# Patient Record
Sex: FEMALE | Race: OTHER | HISPANIC OR LATINO | URBAN - METROPOLITAN AREA
[De-identification: names, ages, dates, MRNs, and addresses within clinical notes are randomized per-mention and may not be internally consistent; named-entity substitution may affect disease eponyms.]

---

## 2018-04-18 ENCOUNTER — EMERGENCY (EMERGENCY)
Facility: HOSPITAL | Age: 73
LOS: 1 days | Discharge: LEFT BEFORE DISCHARGE | End: 2018-04-18
Admitting: EMERGENCY MEDICINE

## 2018-04-18 VITALS
DIASTOLIC BLOOD PRESSURE: 72 MMHG | RESPIRATION RATE: 18 BRPM | OXYGEN SATURATION: 96 % | TEMPERATURE: 98 F | SYSTOLIC BLOOD PRESSURE: 153 MMHG | HEART RATE: 92 BPM

## 2018-04-18 NOTE — ED ADULT TRIAGE NOTE - CHIEF COMPLAINT QUOTE
Patient c/o a rash on parts of her body, large red patches in the folds of her body, under her breasts, bilateral groin area, left under arm and lower back area. Skin is red and excoriated in some areas.  Rash itches, denies any pain.

## 2018-11-15 NOTE — ED ADULT TRIAGE NOTE - CALLED TO TRIAGE FIRST TIME
Procedure(s): RIGHT LITHOTRIPSY EXTRACORPOREAL SHOCKWAVE ESWL. Anesthesia Post Evaluation Multimodal analgesia: multimodal analgesia used between 6 hours prior to anesthesia start to PACU discharge Patient location during evaluation: PACU Patient participation: complete - patient participated Level of consciousness: awake and alert Pain management: adequate Airway patency: patent Anesthetic complications: no 
Cardiovascular status: acceptable Respiratory status: acceptable Hydration status: acceptable Comments: Nausea controlled Visit Vitals /65 Pulse (!) 51 Temp 36.8 °C (98.2 °F) Resp 18 Ht 6' 3\" (1.905 m) Wt 117.6 kg (259 lb 3 oz) SpO2 100% BMI 32.40 kg/m² called for reassessment not ans

## 2020-08-04 ENCOUNTER — INPATIENT (INPATIENT)
Facility: HOSPITAL | Age: 75
LOS: 2 days | Discharge: HOME HEALTH | End: 2020-08-07
Attending: HOSPITALIST | Admitting: HOSPITALIST
Payer: MEDICARE

## 2020-08-04 VITALS
OXYGEN SATURATION: 99 % | RESPIRATION RATE: 18 BRPM | HEART RATE: 90 BPM | SYSTOLIC BLOOD PRESSURE: 150 MMHG | DIASTOLIC BLOOD PRESSURE: 95 MMHG | TEMPERATURE: 98 F

## 2020-08-04 LAB
ALBUMIN SERPL ELPH-MCNC: 4.2 G/DL — SIGNIFICANT CHANGE UP (ref 3.3–5)
ALP SERPL-CCNC: 101 U/L — SIGNIFICANT CHANGE UP (ref 40–120)
ALT FLD-CCNC: 9 U/L — SIGNIFICANT CHANGE UP (ref 4–33)
ANION GAP SERPL CALC-SCNC: 13 MMO/L — SIGNIFICANT CHANGE UP (ref 7–14)
AST SERPL-CCNC: 19 U/L — SIGNIFICANT CHANGE UP (ref 4–32)
BASE EXCESS BLDV CALC-SCNC: 3.1 MMOL/L — SIGNIFICANT CHANGE UP
BASOPHILS # BLD AUTO: 0.03 K/UL — SIGNIFICANT CHANGE UP (ref 0–0.2)
BASOPHILS NFR BLD AUTO: 0.7 % — SIGNIFICANT CHANGE UP (ref 0–2)
BILIRUB SERPL-MCNC: 1.5 MG/DL — HIGH (ref 0.2–1.2)
BLOOD GAS VENOUS - CREATININE: 1.18 MG/DL — SIGNIFICANT CHANGE UP (ref 0.5–1.3)
BUN SERPL-MCNC: 23 MG/DL — SIGNIFICANT CHANGE UP (ref 7–23)
CALCIUM SERPL-MCNC: 10.8 MG/DL — HIGH (ref 8.4–10.5)
CHLORIDE BLDV-SCNC: 107 MMOL/L — SIGNIFICANT CHANGE UP (ref 96–108)
CHLORIDE SERPL-SCNC: 101 MMOL/L — SIGNIFICANT CHANGE UP (ref 98–107)
CO2 SERPL-SCNC: 24 MMOL/L — SIGNIFICANT CHANGE UP (ref 22–31)
CREAT SERPL-MCNC: 1.11 MG/DL — SIGNIFICANT CHANGE UP (ref 0.5–1.3)
EOSINOPHIL # BLD AUTO: 0.33 K/UL — SIGNIFICANT CHANGE UP (ref 0–0.5)
EOSINOPHIL NFR BLD AUTO: 7.6 % — HIGH (ref 0–6)
GAS PNL BLDV: 139 MMOL/L — SIGNIFICANT CHANGE UP (ref 136–146)
GLUCOSE BLDV-MCNC: 90 MG/DL — SIGNIFICANT CHANGE UP (ref 70–99)
GLUCOSE SERPL-MCNC: 96 MG/DL — SIGNIFICANT CHANGE UP (ref 70–99)
HBA1C BLD-MCNC: 5.4 % — SIGNIFICANT CHANGE UP (ref 4–5.6)
HCO3 BLDV-SCNC: 25 MMOL/L — SIGNIFICANT CHANGE UP (ref 20–27)
HCT VFR BLD CALC: 26.3 % — LOW (ref 34.5–45)
HCT VFR BLDV CALC: 30.1 % — LOW (ref 34.5–45)
HGB BLD-MCNC: 8.9 G/DL — LOW (ref 11.5–15.5)
HGB BLDV-MCNC: 9.7 G/DL — LOW (ref 11.5–15.5)
IMM GRANULOCYTES NFR BLD AUTO: 0.5 % — SIGNIFICANT CHANGE UP (ref 0–1.5)
LACTATE BLDV-MCNC: 1.4 MMOL/L — SIGNIFICANT CHANGE UP (ref 0.5–2)
LYMPHOCYTES # BLD AUTO: 1.88 K/UL — SIGNIFICANT CHANGE UP (ref 1–3.3)
LYMPHOCYTES # BLD AUTO: 43 % — SIGNIFICANT CHANGE UP (ref 13–44)
MAGNESIUM SERPL-MCNC: 2 MG/DL — SIGNIFICANT CHANGE UP (ref 1.6–2.6)
MCHC RBC-ENTMCNC: 33.8 % — SIGNIFICANT CHANGE UP (ref 32–36)
MCHC RBC-ENTMCNC: 42 PG — HIGH (ref 27–34)
MCV RBC AUTO: 124.1 FL — HIGH (ref 80–100)
MONOCYTES # BLD AUTO: 0.11 K/UL — SIGNIFICANT CHANGE UP (ref 0–0.9)
MONOCYTES NFR BLD AUTO: 2.5 % — SIGNIFICANT CHANGE UP (ref 2–14)
NEUTROPHILS # BLD AUTO: 2 K/UL — SIGNIFICANT CHANGE UP (ref 1.8–7.4)
NEUTROPHILS NFR BLD AUTO: 45.7 % — SIGNIFICANT CHANGE UP (ref 43–77)
NRBC # FLD: 0.02 K/UL — SIGNIFICANT CHANGE UP (ref 0–0)
PCO2 BLDV: 51 MMHG — SIGNIFICANT CHANGE UP (ref 41–51)
PH BLDV: 7.36 PH — SIGNIFICANT CHANGE UP (ref 7.32–7.43)
PHOSPHATE SERPL-MCNC: 2.6 MG/DL — SIGNIFICANT CHANGE UP (ref 2.5–4.5)
PLATELET # BLD AUTO: 187 K/UL — SIGNIFICANT CHANGE UP (ref 150–400)
PMV BLD: 11.6 FL — SIGNIFICANT CHANGE UP (ref 7–13)
PO2 BLDV: < 24 MMHG — LOW (ref 35–40)
POTASSIUM BLDV-SCNC: 4.2 MMOL/L — SIGNIFICANT CHANGE UP (ref 3.4–4.5)
POTASSIUM SERPL-MCNC: 4.7 MMOL/L — SIGNIFICANT CHANGE UP (ref 3.5–5.3)
POTASSIUM SERPL-SCNC: 4.7 MMOL/L — SIGNIFICANT CHANGE UP (ref 3.5–5.3)
PROT SERPL-MCNC: 8.3 G/DL — SIGNIFICANT CHANGE UP (ref 6–8.3)
RBC # BLD: 2.12 M/UL — LOW (ref 3.8–5.2)
RBC # FLD: 14 % — SIGNIFICANT CHANGE UP (ref 10.3–14.5)
SAO2 % BLDV: 14.1 % — LOW (ref 60–85)
SODIUM SERPL-SCNC: 138 MMOL/L — SIGNIFICANT CHANGE UP (ref 135–145)
TROPONIN T, HIGH SENSITIVITY: 9 NG/L — SIGNIFICANT CHANGE UP (ref ?–14)
WBC # BLD: 4.37 K/UL — SIGNIFICANT CHANGE UP (ref 3.8–10.5)
WBC # FLD AUTO: 4.37 K/UL — SIGNIFICANT CHANGE UP (ref 3.8–10.5)

## 2020-08-04 PROCEDURE — 93010 ELECTROCARDIOGRAM REPORT: CPT

## 2020-08-04 PROCEDURE — 99285 EMERGENCY DEPT VISIT HI MDM: CPT

## 2020-08-04 PROCEDURE — 71045 X-RAY EXAM CHEST 1 VIEW: CPT | Mod: 26

## 2020-08-04 NOTE — ED PROVIDER NOTE - CLINICAL SUMMARY MEDICAL DECISION MAKING FREE TEXT BOX
Patient is a 75 y.o female with PMHx of HTN, HLD, CAD w/ MI 20 yrs ago, DM (on metformin), Asthma, hx of DVT b/l (on ASA) who presents to ED c/o b/l LE cramping pain with numbness and tingling x 2 weeks but worse over past week. DDx- DM neuropathy, failure to thrive,  r/o signs of infection. Plan- labs, ecg, cxr, UA/uc, will monitor and reassess.

## 2020-08-04 NOTE — ED ADULT TRIAGE NOTE - CHIEF COMPLAINT QUOTE
Pt. c/o intermittent bilateral foot cramping x 1 week. All extremities strong and equal. PMHx: DM1, HTN, asthma. Finger stick 111

## 2020-08-04 NOTE — ED ADULT NURSE NOTE - OBJECTIVE STATEMENT
74 yo F AAOx4 received to room 22 c/o increasing weakness x a few days, pt ambulates independently OOB at baseline but reports inability to bare weight, pt also endorses foot cramping in b/l feet, + sensation/movement to extremities, VSS, no further complaints, IV placed, pending CXR

## 2020-08-04 NOTE — ED PROVIDER NOTE - OBJECTIVE STATEMENT
HPI- Patient is a 75 y.o female with PMHx of HTN, HLD, CAD w/ MI 20 yrs ago, DM (on metformin), Asthma, hx of DVT b/l (on ASA) who presents to ED c/o b/l LE cramping pain with numbness and tingling x 2 weeks but worse over past week. Pt notes difficulty ambulating at home over past 1.5 weeks. Denies usually having to use walker or cane. Pt also notes decreased appetite over past week, stating +weight loss over past week. Also mentions intermittent room spinning dizziness. Pt also complaining of red pruritic rash underneath breast, to groin and to buttock crease. Pt recently prescribed clotrimazole. Denies cp, sob, n/v/d, Saddle anesthesia, incontinence, pleuritic pain, headache, changes in vision, fevers, chills, cough, trauma/falls or any other complaints. Pt lives at home with , daughter and granddaughter.

## 2020-08-04 NOTE — ED PROVIDER NOTE - PMH
Asthma    Diabetes    DVT (deep venous thrombosis), right  left  leg diagnosed  6 months ago  Essential hypertension    Hypothyroid    Leg swelling    Ventral hernia

## 2020-08-04 NOTE — ED PROVIDER NOTE - PHYSICAL EXAMINATION
Vital signs reviewed.   CONSTITUTIONAL: in no apparent distress. Non-toxic appearing.   HEAD: Normocephalic, atraumatic.  EYES: PERRL, EOM intact, conjunctiva and sclera WNL.  ENT: normal nose; no rhinorrhea; normal pharynx   NECK/LYMPH: Supple; non-tender;   CARD: Normal S1, S2; no murmurs, rubs, or gallops noted.  RESP: Normal chest excursion with respiration; breath sounds clear and equal bilaterally; no wheezes, rhonchi, or rales.  ABD/GI: soft, non-distended; non-tender; Rectal exam performed with chaperone MD Fernandez- Normal rectal tone. +excoriation noted to buttock crease with staged 2 ulcer noted.   EXT/MS: moves all extremities; distal pulses are normal, No midline tenderness.   SKIN: Normal for age and race; warm; dry; good turgor; +red excoriated rash noted underneath breast b/l and to groin f/l. No signs of cellulitis. No lymphangitis. No fluctuance.   NEURO: Awake, alert, oriented x 3, no gross deficits, CN II-XII grossly intact, no motor deficit noted. No facial droop. Normal finger to nose. +decreased sensation LE b/l. No pronator drift noted.   PSYCH: Normal mood; appropriate affect.

## 2020-08-04 NOTE — ED PROVIDER NOTE - ATTENDING CONTRIBUTION TO CARE
HPI: 75 y.o female with PMHx of HTN, HLD, CAD w/ MI 20 yrs ago, DM (on metformin), Asthma, hx of DVT b/l (on ASA) who presents to ED c/o b/l LE cramping pain with numbness and tingling x 2 weeks but worse over past week. Pt notes difficulty ambulating at home over past 1.5 weeks. Denies usually having to use walker or cane. Pt also notes decreased appetite over past week, stating +weight loss over past week. Also mentions intermittent room spinning dizziness. Pt also complaining of red pruritic rash underneath breast, to groin and to buttock crease. Pt recently prescribed clotrimazole but hasn't used. Denies cp, sob, n/v/d, Saddle anesthesia, incontinence, pleuritic pain, headache, changes in vision, fevers, chills, cough, trauma/falls or any other complaints. Pt lives at home with , daughter and granddaughter.  EXAM: Awake, alert, eyes EOMI/PERRL, neck supple, heart RRR, lungs ctab, abd soft but distended, skin with fungal rash beneath bilateral breast and under pannus as well as stage 2 ulcer in sacrum without bleeding, normal rectal tone and normal perianal sensation to light touch, no pitting edema BLE with normal strength at bilateral hips but gait not tested due to pt complaint of not feeling steady. CN 2-12 intact, finger to nose intact.   MDM: pt with multiple medical problems that presents with multiple issues including cramping/paresthesia, dizziness, and now reports she is weak to point where she cannot ambulate but normally does not walk with any assistance. She also reports significant unexplained weight loss but without night sweats. Will need work up with labs and check UA and reassess. unlikely cord compression as normal rectal tone and normal sensation and no back pain. Will possible admit for inability to ambulate.

## 2020-08-05 ENCOUNTER — TRANSCRIPTION ENCOUNTER (OUTPATIENT)
Age: 75
End: 2020-08-05

## 2020-08-05 DIAGNOSIS — D51.9 VITAMIN B12 DEFICIENCY ANEMIA, UNSPECIFIED: ICD-10-CM

## 2020-08-05 DIAGNOSIS — Z29.9 ENCOUNTER FOR PROPHYLACTIC MEASURES, UNSPECIFIED: ICD-10-CM

## 2020-08-05 DIAGNOSIS — E53.8 DEFICIENCY OF OTHER SPECIFIED B GROUP VITAMINS: ICD-10-CM

## 2020-08-05 DIAGNOSIS — I10 ESSENTIAL (PRIMARY) HYPERTENSION: ICD-10-CM

## 2020-08-05 DIAGNOSIS — E43 UNSPECIFIED SEVERE PROTEIN-CALORIE MALNUTRITION: ICD-10-CM

## 2020-08-05 DIAGNOSIS — J45.40 MODERATE PERSISTENT ASTHMA, UNCOMPLICATED: ICD-10-CM

## 2020-08-05 DIAGNOSIS — E11.9 TYPE 2 DIABETES MELLITUS WITHOUT COMPLICATIONS: ICD-10-CM

## 2020-08-05 DIAGNOSIS — R53.1 WEAKNESS: ICD-10-CM

## 2020-08-05 DIAGNOSIS — Z79.899 OTHER LONG TERM (CURRENT) DRUG THERAPY: ICD-10-CM

## 2020-08-05 DIAGNOSIS — E03.9 HYPOTHYROIDISM, UNSPECIFIED: ICD-10-CM

## 2020-08-05 DIAGNOSIS — R63.4 ABNORMAL WEIGHT LOSS: ICD-10-CM

## 2020-08-05 LAB
ALBUMIN SERPL ELPH-MCNC: 3.8 G/DL — SIGNIFICANT CHANGE UP (ref 3.3–5)
ALP SERPL-CCNC: 93 U/L — SIGNIFICANT CHANGE UP (ref 40–120)
ALT FLD-CCNC: 8 U/L — SIGNIFICANT CHANGE UP (ref 4–33)
ANION GAP SERPL CALC-SCNC: 12 MMO/L — SIGNIFICANT CHANGE UP (ref 7–14)
ANISOCYTOSIS BLD QL: SLIGHT — SIGNIFICANT CHANGE UP
APPEARANCE UR: SIGNIFICANT CHANGE UP
APTT BLD: 31.3 SEC — SIGNIFICANT CHANGE UP (ref 27–36.3)
AST SERPL-CCNC: 14 U/L — SIGNIFICANT CHANGE UP (ref 4–32)
BACTERIA # UR AUTO: SIGNIFICANT CHANGE UP
BASOPHILS # BLD AUTO: 0.03 K/UL — SIGNIFICANT CHANGE UP (ref 0–0.2)
BASOPHILS NFR BLD AUTO: 0.8 % — SIGNIFICANT CHANGE UP (ref 0–2)
BASOPHILS NFR SPEC: 2.7 % — HIGH (ref 0–2)
BILIRUB SERPL-MCNC: 1.2 MG/DL — SIGNIFICANT CHANGE UP (ref 0.2–1.2)
BILIRUB UR-MCNC: NEGATIVE — SIGNIFICANT CHANGE UP
BLASTS # FLD: 0 % — SIGNIFICANT CHANGE UP (ref 0–0)
BLD GP AB SCN SERPL QL: NEGATIVE — SIGNIFICANT CHANGE UP
BLOOD UR QL VISUAL: NEGATIVE — SIGNIFICANT CHANGE UP
BUN SERPL-MCNC: 20 MG/DL — SIGNIFICANT CHANGE UP (ref 7–23)
CALCIUM SERPL-MCNC: 9.7 MG/DL — SIGNIFICANT CHANGE UP (ref 8.4–10.5)
CHLORIDE SERPL-SCNC: 100 MMOL/L — SIGNIFICANT CHANGE UP (ref 98–107)
CO2 SERPL-SCNC: 26 MMOL/L — SIGNIFICANT CHANGE UP (ref 22–31)
COLOR SPEC: YELLOW — SIGNIFICANT CHANGE UP
CREAT SERPL-MCNC: 1.08 MG/DL — SIGNIFICANT CHANGE UP (ref 0.5–1.3)
EOSINOPHIL # BLD AUTO: 0.37 K/UL — SIGNIFICANT CHANGE UP (ref 0–0.5)
EOSINOPHIL NFR BLD AUTO: 9.3 % — HIGH (ref 0–6)
EOSINOPHIL NFR FLD: 5.4 % — SIGNIFICANT CHANGE UP (ref 0–6)
EPI CELLS # UR: SIGNIFICANT CHANGE UP
FERRITIN SERPL-MCNC: 162.4 NG/ML — HIGH (ref 15–150)
FOLATE SERPL-MCNC: 15.1 NG/ML — SIGNIFICANT CHANGE UP (ref 4.7–20)
GIANT PLATELETS BLD QL SMEAR: PRESENT — SIGNIFICANT CHANGE UP
GLUCOSE BLDC GLUCOMTR-MCNC: 103 MG/DL — HIGH (ref 70–99)
GLUCOSE BLDC GLUCOMTR-MCNC: 118 MG/DL — HIGH (ref 70–99)
GLUCOSE BLDC GLUCOMTR-MCNC: 84 MG/DL — SIGNIFICANT CHANGE UP (ref 70–99)
GLUCOSE BLDC GLUCOMTR-MCNC: 87 MG/DL — SIGNIFICANT CHANGE UP (ref 70–99)
GLUCOSE SERPL-MCNC: 91 MG/DL — SIGNIFICANT CHANGE UP (ref 70–99)
GLUCOSE UR-MCNC: NEGATIVE — SIGNIFICANT CHANGE UP
HCT VFR BLD CALC: 25 % — LOW (ref 34.5–45)
HCYS SERPL-MCNC: >250 UMOL/L — HIGH
HGB BLD-MCNC: 8.3 G/DL — LOW (ref 11.5–15.5)
IMM GRANULOCYTES NFR BLD AUTO: 0.5 % — SIGNIFICANT CHANGE UP (ref 0–1.5)
IRON SATN MFR SERPL: 100 UG/DL — SIGNIFICANT CHANGE UP (ref 30–160)
IRON SATN MFR SERPL: 272 UG/DL — SIGNIFICANT CHANGE UP (ref 140–530)
KETONES UR-MCNC: SIGNIFICANT CHANGE UP
LEUKOCYTE ESTERASE UR-ACNC: SIGNIFICANT CHANGE UP
LYMPHOCYTES # BLD AUTO: 1.75 K/UL — SIGNIFICANT CHANGE UP (ref 1–3.3)
LYMPHOCYTES # BLD AUTO: 44.2 % — HIGH (ref 13–44)
LYMPHOCYTES NFR SPEC AUTO: 42.3 % — SIGNIFICANT CHANGE UP (ref 13–44)
MACROCYTES BLD QL: SLIGHT — SIGNIFICANT CHANGE UP
MCHC RBC-ENTMCNC: 33.2 % — SIGNIFICANT CHANGE UP (ref 32–36)
MCHC RBC-ENTMCNC: 42.6 PG — HIGH (ref 27–34)
MCV RBC AUTO: 128.2 FL — HIGH (ref 80–100)
METAMYELOCYTES # FLD: 0 % — SIGNIFICANT CHANGE UP (ref 0–1)
MONOCYTES # BLD AUTO: 0.13 K/UL — SIGNIFICANT CHANGE UP (ref 0–0.9)
MONOCYTES NFR BLD AUTO: 3.3 % — SIGNIFICANT CHANGE UP (ref 2–14)
MONOCYTES NFR BLD: 0.9 % — LOW (ref 2–9)
MYELOCYTES NFR BLD: 0 % — SIGNIFICANT CHANGE UP (ref 0–0)
NEUTROPHIL AB SER-ACNC: 44.2 % — SIGNIFICANT CHANGE UP (ref 43–77)
NEUTROPHILS # BLD AUTO: 1.66 K/UL — LOW (ref 1.8–7.4)
NEUTROPHILS NFR BLD AUTO: 41.9 % — LOW (ref 43–77)
NEUTS BAND # BLD: 0 % — SIGNIFICANT CHANGE UP (ref 0–6)
NITRITE UR-MCNC: POSITIVE — HIGH
NRBC # FLD: 0 K/UL — SIGNIFICANT CHANGE UP (ref 0–0)
OTHER - HEMATOLOGY %: 0 — SIGNIFICANT CHANGE UP
OVALOCYTES BLD QL SMEAR: SLIGHT — SIGNIFICANT CHANGE UP
PH UR: 7 — SIGNIFICANT CHANGE UP (ref 5–8)
PLATELET # BLD AUTO: 186 K/UL — SIGNIFICANT CHANGE UP (ref 150–400)
PLATELET COUNT - ESTIMATE: NORMAL — SIGNIFICANT CHANGE UP
PMV BLD: 11.8 FL — SIGNIFICANT CHANGE UP (ref 7–13)
POIKILOCYTOSIS BLD QL AUTO: SLIGHT — SIGNIFICANT CHANGE UP
POLYCHROMASIA BLD QL SMEAR: SLIGHT — SIGNIFICANT CHANGE UP
POTASSIUM SERPL-MCNC: 3.9 MMOL/L — SIGNIFICANT CHANGE UP (ref 3.5–5.3)
POTASSIUM SERPL-SCNC: 3.9 MMOL/L — SIGNIFICANT CHANGE UP (ref 3.5–5.3)
PROMYELOCYTES # FLD: 0 % — SIGNIFICANT CHANGE UP (ref 0–0)
PROT SERPL-MCNC: 7.4 G/DL — SIGNIFICANT CHANGE UP (ref 6–8.3)
PROT UR-MCNC: SIGNIFICANT CHANGE UP
RBC # BLD: 1.95 M/UL — LOW (ref 3.8–5.2)
RBC # FLD: 14.3 % — SIGNIFICANT CHANGE UP (ref 10.3–14.5)
RBC CASTS # UR COMP ASSIST: SIGNIFICANT CHANGE UP (ref 0–?)
RH IG SCN BLD-IMP: POSITIVE — SIGNIFICANT CHANGE UP
SARS-COV-2 RNA SPEC QL NAA+PROBE: SIGNIFICANT CHANGE UP
SMUDGE CELLS # BLD: PRESENT — SIGNIFICANT CHANGE UP
SODIUM SERPL-SCNC: 138 MMOL/L — SIGNIFICANT CHANGE UP (ref 135–145)
SP GR SPEC: 1.02 — SIGNIFICANT CHANGE UP (ref 1–1.04)
T PALLIDUM AB TITR SER: NEGATIVE — SIGNIFICANT CHANGE UP
TROPONIN T, HIGH SENSITIVITY: 8 NG/L — SIGNIFICANT CHANGE UP (ref ?–14)
TSH SERPL-MCNC: 0.19 UIU/ML — LOW (ref 0.27–4.2)
UIBC SERPL-MCNC: 171.6 UG/DL — SIGNIFICANT CHANGE UP (ref 110–370)
UROBILINOGEN FLD QL: NORMAL — SIGNIFICANT CHANGE UP
VARIANT LYMPHS # BLD: 4.5 % — SIGNIFICANT CHANGE UP
VIT B12 SERPL-MCNC: 150 PG/ML — LOW (ref 200–900)
WBC # BLD: 3.96 K/UL — SIGNIFICANT CHANGE UP (ref 3.8–10.5)
WBC # FLD AUTO: 3.96 K/UL — SIGNIFICANT CHANGE UP (ref 3.8–10.5)
WBC UR QL: HIGH (ref 0–?)

## 2020-08-05 PROCEDURE — 12345: CPT | Mod: NC

## 2020-08-05 PROCEDURE — 99223 1ST HOSP IP/OBS HIGH 75: CPT

## 2020-08-05 PROCEDURE — 99222 1ST HOSP IP/OBS MODERATE 55: CPT | Mod: GC

## 2020-08-05 PROCEDURE — 74177 CT ABD & PELVIS W/CONTRAST: CPT | Mod: 26

## 2020-08-05 RX ORDER — ENOXAPARIN SODIUM 100 MG/ML
40 INJECTION SUBCUTANEOUS DAILY
Refills: 0 | Status: DISCONTINUED | OUTPATIENT
Start: 2020-08-05 | End: 2020-08-05

## 2020-08-05 RX ORDER — HEPARIN SODIUM 5000 [USP'U]/ML
2500 INJECTION INTRAVENOUS; SUBCUTANEOUS EVERY 6 HOURS
Refills: 0 | Status: DISCONTINUED | OUTPATIENT
Start: 2020-08-05 | End: 2020-08-06

## 2020-08-05 RX ORDER — CEFTRIAXONE 500 MG/1
1000 INJECTION, POWDER, FOR SOLUTION INTRAMUSCULAR; INTRAVENOUS EVERY 24 HOURS
Refills: 0 | Status: COMPLETED | OUTPATIENT
Start: 2020-08-05 | End: 2020-08-07

## 2020-08-05 RX ORDER — ONDANSETRON 8 MG/1
4 TABLET, FILM COATED ORAL EVERY 6 HOURS
Refills: 0 | Status: DISCONTINUED | OUTPATIENT
Start: 2020-08-05 | End: 2020-08-07

## 2020-08-05 RX ORDER — DEXTROSE 50 % IN WATER 50 %
15 SYRINGE (ML) INTRAVENOUS ONCE
Refills: 0 | Status: DISCONTINUED | OUTPATIENT
Start: 2020-08-05 | End: 2020-08-06

## 2020-08-05 RX ORDER — LEVOTHYROXINE SODIUM 125 MCG
125 TABLET ORAL DAILY
Refills: 0 | Status: DISCONTINUED | OUTPATIENT
Start: 2020-08-05 | End: 2020-08-07

## 2020-08-05 RX ORDER — HEPARIN SODIUM 5000 [USP'U]/ML
INJECTION INTRAVENOUS; SUBCUTANEOUS
Qty: 25000 | Refills: 0 | Status: DISCONTINUED | OUTPATIENT
Start: 2020-08-05 | End: 2020-08-06

## 2020-08-05 RX ORDER — INSULIN LISPRO 100/ML
VIAL (ML) SUBCUTANEOUS
Refills: 0 | Status: DISCONTINUED | OUTPATIENT
Start: 2020-08-05 | End: 2020-08-06

## 2020-08-05 RX ORDER — NIFEDIPINE 30 MG
30 TABLET, EXTENDED RELEASE 24 HR ORAL DAILY
Refills: 0 | Status: DISCONTINUED | OUTPATIENT
Start: 2020-08-05 | End: 2020-08-07

## 2020-08-05 RX ORDER — ACETAMINOPHEN 500 MG
650 TABLET ORAL ONCE
Refills: 0 | Status: COMPLETED | OUTPATIENT
Start: 2020-08-05 | End: 2020-08-05

## 2020-08-05 RX ORDER — SODIUM CHLORIDE 9 MG/ML
1000 INJECTION, SOLUTION INTRAVENOUS
Refills: 0 | Status: DISCONTINUED | OUTPATIENT
Start: 2020-08-05 | End: 2020-08-06

## 2020-08-05 RX ORDER — GLUCAGON INJECTION, SOLUTION 0.5 MG/.1ML
1 INJECTION, SOLUTION SUBCUTANEOUS ONCE
Refills: 0 | Status: DISCONTINUED | OUTPATIENT
Start: 2020-08-05 | End: 2020-08-06

## 2020-08-05 RX ORDER — DEXTROSE 50 % IN WATER 50 %
12.5 SYRINGE (ML) INTRAVENOUS ONCE
Refills: 0 | Status: DISCONTINUED | OUTPATIENT
Start: 2020-08-05 | End: 2020-08-06

## 2020-08-05 RX ORDER — HEPARIN SODIUM 5000 [USP'U]/ML
5500 INJECTION INTRAVENOUS; SUBCUTANEOUS EVERY 6 HOURS
Refills: 0 | Status: DISCONTINUED | OUTPATIENT
Start: 2020-08-05 | End: 2020-08-06

## 2020-08-05 RX ORDER — INSULIN LISPRO 100/ML
VIAL (ML) SUBCUTANEOUS AT BEDTIME
Refills: 0 | Status: DISCONTINUED | OUTPATIENT
Start: 2020-08-05 | End: 2020-08-06

## 2020-08-05 RX ORDER — DEXTROSE 50 % IN WATER 50 %
25 SYRINGE (ML) INTRAVENOUS ONCE
Refills: 0 | Status: DISCONTINUED | OUTPATIENT
Start: 2020-08-05 | End: 2020-08-06

## 2020-08-05 RX ORDER — PREGABALIN 225 MG/1
1000 CAPSULE ORAL ONCE
Refills: 0 | Status: COMPLETED | OUTPATIENT
Start: 2020-08-05 | End: 2020-08-05

## 2020-08-05 RX ORDER — BUDESONIDE AND FORMOTEROL FUMARATE DIHYDRATE 160; 4.5 UG/1; UG/1
2 AEROSOL RESPIRATORY (INHALATION)
Refills: 0 | Status: DISCONTINUED | OUTPATIENT
Start: 2020-08-05 | End: 2020-08-07

## 2020-08-05 RX ORDER — PREGABALIN 225 MG/1
1000 CAPSULE ORAL DAILY
Refills: 0 | Status: DISCONTINUED | OUTPATIENT
Start: 2020-08-05 | End: 2020-08-07

## 2020-08-05 RX ORDER — ALBUTEROL 90 UG/1
2 AEROSOL, METERED ORAL EVERY 6 HOURS
Refills: 0 | Status: DISCONTINUED | OUTPATIENT
Start: 2020-08-05 | End: 2020-08-07

## 2020-08-05 RX ORDER — ACETAMINOPHEN 500 MG
650 TABLET ORAL EVERY 6 HOURS
Refills: 0 | Status: DISCONTINUED | OUTPATIENT
Start: 2020-08-05 | End: 2020-08-07

## 2020-08-05 RX ORDER — ASPIRIN/CALCIUM CARB/MAGNESIUM 324 MG
81 TABLET ORAL DAILY
Refills: 0 | Status: DISCONTINUED | OUTPATIENT
Start: 2020-08-05 | End: 2020-08-05

## 2020-08-05 RX ORDER — PREGABALIN 225 MG/1
1000 CAPSULE ORAL
Refills: 0 | Status: DISCONTINUED | OUTPATIENT
Start: 2020-08-05 | End: 2020-08-05

## 2020-08-05 RX ADMIN — BUDESONIDE AND FORMOTEROL FUMARATE DIHYDRATE 2 PUFF(S): 160; 4.5 AEROSOL RESPIRATORY (INHALATION) at 11:11

## 2020-08-05 RX ADMIN — Medication 20 MILLIGRAM(S): at 18:31

## 2020-08-05 RX ADMIN — Medication 20 MILLIGRAM(S): at 11:16

## 2020-08-05 RX ADMIN — ENOXAPARIN SODIUM 40 MILLIGRAM(S): 100 INJECTION SUBCUTANEOUS at 11:17

## 2020-08-05 RX ADMIN — Medication 30 MILLIGRAM(S): at 11:12

## 2020-08-05 RX ADMIN — BUDESONIDE AND FORMOTEROL FUMARATE DIHYDRATE 2 PUFF(S): 160; 4.5 AEROSOL RESPIRATORY (INHALATION) at 20:55

## 2020-08-05 RX ADMIN — Medication 81 MILLIGRAM(S): at 11:17

## 2020-08-05 RX ADMIN — Medication 125 MICROGRAM(S): at 07:34

## 2020-08-05 RX ADMIN — Medication 1 APPLICATION(S): at 18:31

## 2020-08-05 RX ADMIN — Medication 650 MILLIGRAM(S): at 03:00

## 2020-08-05 RX ADMIN — PREGABALIN 1000 MICROGRAM(S): 225 CAPSULE ORAL at 11:10

## 2020-08-05 RX ADMIN — Medication 1 APPLICATION(S): at 11:17

## 2020-08-05 RX ADMIN — Medication 650 MILLIGRAM(S): at 01:58

## 2020-08-05 RX ADMIN — CEFTRIAXONE 100 MILLIGRAM(S): 500 INJECTION, POWDER, FOR SOLUTION INTRAMUSCULAR; INTRAVENOUS at 02:50

## 2020-08-05 RX ADMIN — HEPARIN SODIUM 1200 UNIT(S)/HR: 5000 INJECTION INTRAVENOUS; SUBCUTANEOUS at 20:54

## 2020-08-05 NOTE — H&P ADULT - PROBLEM SELECTOR PLAN 1
-Suspect b/l lower extremity numbness and tingling is related to B12 deficiency  -Uncertain etiology of B12 deficiency - possibly intrinsic factor deficiency.  Denies a voluntary dietary restriction that might cause deficiency.  Does have vitiligo which may suggest autoimmune dysfunction.  Metformin also interferes with B12  -Given neurologic symptoms, will supplement IM to start with 1000 micrograms now and then approximately every 2 days.    -Will also check copper, RPR, homocysteine, IF antibodies -Suspect b/l lower extremity numbness and tingling is related to B12 deficiency  -Uncertain etiology of B12 deficiency - possibly intrinsic factor deficiency.  Denies a voluntary dietary restriction that might cause deficiency.  Does have vitiligo which may suggest autoimmune dysfunction.  Metformin also interferes with B12  -Given neurologic symptoms, will supplement IM to start with 1000 micrograms now and then approximately every 2 days.    -Will also check copper, RPR, homocysteine, IF antibodies    Endorses dysuria - UA concerning for UTI.  Ceftriaxone pending urine cultures.

## 2020-08-05 NOTE — CONSULT NOTE ADULT - ATTENDING COMMENTS
GI consulted for weight loss and concern for pernicious anemia.   Patient with recent loss of appetite and unintentional weight loss.   Labs notable for significant macrocytosis and Vit B12 deficiency.   CT performed without evidence of malignancy, but thrombi in left common iliac vein, external iliac vein, and visualized femoral vein.   Given weight loss, concern for pernicious anemia and associated malignancy risk, reasonable to pursue EGD if patient amenable.

## 2020-08-05 NOTE — DIETITIAN INITIAL EVALUATION ADULT. - DIET TYPE
PO diet Glucerna Therapeutic Nutrition 8oz. 2x daily (will provide additional ~440 Kcals, ~20 gm Protein)/supplement (specify)/consistent carbohydrate (no snacks)

## 2020-08-05 NOTE — H&P ADULT - HISTORY OF PRESENT ILLNESS
Janine Lui is a 75 year old woman with a past medical history of HTN, HLD, CAD s/p MI, T2DM, Asthma, reported hx of dVT who presents for b/l LE cramping pain and numbness.    She reports about 2-3 weeks of cramping pain in her bilateral lower extremities.  She also endorses about 22 lbs weight loss over the past 2 weeks, some intermittent lightheadedness or dizziness, difficulty ambulating due to pain and numbness and tingling in her lower extremities.  She has had a rash being treated with clotrimazole.  No chest pain, shortness of breath, incontinence, but does endorse some dysuria.  Due to her lower extremity symptoms, she presented to [facility] ED for evaluation.    In the ED, she was afebrile with unremarkable vital signs.  Diagnostics revealed a B12 of 150, normal folate, and a macrocytic anemia. She was admitted for further management.    On evaluation, she gave the above history.  She reports 3 weeks of decreased appetite and poor PO intake. Janine Lui is a 75 year old woman with a past medical history of HTN, HLD, CAD s/p MI, T2DM, Asthma, reported hx of dVT who presents for b/l LE cramping pain and numbness.    She reports about 2-3 weeks of cramping pain in her bilateral lower extremities.  She also endorses about 22 lbs weight loss over the past 2 weeks, some intermittent lightheadedness or dizziness, difficulty ambulating due to pain and numbness and tingling in her lower extremities.  She has had a rash being treated with clotrimazole.  No chest pain, shortness of breath, incontinence, but does endorse some dysuria.  Due to her lower extremity symptoms, she presented to [facility] ED for evaluation.    In the ED, she was afebrile with unremarkable vital signs.  Diagnostics revealed a B12 of 150, normal folate, and a macrocytic anemia. She was admitted for further management.    On evaluation, she gave the above history.  She reports 3 weeks of decreased appetite and poor PO intake.  She denies any prior dietary restriction or GI surgeries.

## 2020-08-05 NOTE — H&P ADULT - PROBLEM SELECTOR PLAN 5
Uncertain home meds.  Will continue enalapril and nifedipine she was previously on pending formal med rec.

## 2020-08-05 NOTE — H&P ADULT - NSICDXPASTMEDICALHX_GEN_ALL_CORE_FT
PAST MEDICAL HISTORY:  Asthma     Diabetes     DVT (deep venous thrombosis), right     Essential hypertension     Hypothyroid     Leg swelling     Ventral hernia

## 2020-08-05 NOTE — H&P ADULT - NSHPLABSRESULTS_GEN_ALL_CORE
Diagnostics reviewed and remarkable for:CBC w/ hgb 8.9, .1  Troponin 9->8  B12 150  Folate 15.1  CMP w/ calcium 10.8, tbili 1.5, Mg 2.0, A1c 5.4, phos 2.6  VBG w/ lactate 1.4  CXR personally reviewed and clear lungs, rotated.  COVID, MMA, TSH, UA/UCxpending  EKG personally reviewed and NSR LVH, delayed R wave progression, TWI in V1-3, and III.  QTc 417.  No prior found.

## 2020-08-05 NOTE — PROGRESS NOTE ADULT - PROBLEM SELECTOR PLAN 2
-Uncertain etiology of recent weight loss and poor PO intake - possibly related to B12 deficiency  -Will obtain CT a/p to evaluate for potential GI malignancy given if she does have pernicious anemia this increases risk for gastric cancer  -Consider GI evaluation -Uncertain etiology of recent weight loss and poor PO intake - possibly related to B12 deficiency  -Will obtain CT a/p to evaluate for potential GI malignancy given if she does have pernicious anemia this increases risk for gastric cancer  -F/u GI evaluation

## 2020-08-05 NOTE — DIETITIAN INITIAL EVALUATION ADULT. - REASON INDICATOR FOR ASSESSMENT
Assessment; Signif. decrease of oral intake > 3d prior to admit  Unintentional weight loss prior to admit

## 2020-08-05 NOTE — CONSULT NOTE ADULT - SUBJECTIVE AND OBJECTIVE BOX
Chief Complaint:  Patient is a 75y old  Female who presents with a chief complaint of CC: Lower extremity cramping/numbness (05 Aug 2020 12:36)      HPI: 76 y/o F w/ Hx of hypothyroidism, vitiligo, DM, CAD, HTN, HLD who p/w leg cramping c/f peripheral neuropathy. Found to have macrocytic anemia w/ B12 deficiency. GI now consulted due to c/f pernicious anemia in the setting of 20lb weight loss.     Pt reports     Allergies:  Apple Juice (Rash)  Carrots (Rash)  No Known Drug Allergies  pork (Rash)      Home Medications:    Hospital Medications:  acetaminophen   Tablet .. 650 milliGRAM(s) Oral every 6 hours PRN  ALBUTerol    90 MICROgram(s) HFA Inhaler 2 Puff(s) Inhalation every 6 hours PRN  aspirin enteric coated 81 milliGRAM(s) Oral daily  budesonide 160 MICROgram(s)/formoterol 4.5 MICROgram(s) Inhaler 2 Puff(s) Inhalation two times a day  cefTRIAXone   IVPB 1000 milliGRAM(s) IV Intermittent every 24 hours  clotrimazole 1% Cream 1 Application(s) Topical two times a day  cyanocobalamin Injectable 1000 MICROGram(s) IntraMuscular daily  dextrose 40% Gel 15 Gram(s) Oral once PRN  dextrose 5%. 1000 milliLiter(s) IV Continuous <Continuous>  dextrose 50% Injectable 12.5 Gram(s) IV Push once  dextrose 50% Injectable 25 Gram(s) IV Push once  dextrose 50% Injectable 25 Gram(s) IV Push once  enalapril 20 milliGRAM(s) Oral two times a day  enoxaparin Injectable 40 milliGRAM(s) SubCutaneous daily  glucagon  Injectable 1 milliGRAM(s) IntraMuscular once PRN  insulin lispro (HumaLOG) corrective regimen sliding scale   SubCutaneous three times a day before meals  insulin lispro (HumaLOG) corrective regimen sliding scale   SubCutaneous at bedtime  levothyroxine 125 MICROGram(s) Oral daily  NIFEdipine XL 30 milliGRAM(s) Oral daily  ondansetron Injectable 4 milliGRAM(s) IV Push every 6 hours PRN      PMHX/PSHX:  Leg swelling  DVT (deep venous thrombosis), right  Hypothyroid  Ventral hernia  Diabetes  Asthma  Essential hypertension  No significant past surgical history      Family history:  FH: diabetes mellitus  No pertinent family history in first degree relatives  No pertinent family history in first degree relatives      Denies family history of colon cancer/polyps, stomach cancer/polyps, pancreatic cancer/masses, liver cancer/disease, ovarian cancer and endometrial cancer.    Social History:     Tob: Denies  EtOH: Denies  Illicit Drugs: Denies    ROS:     General:  No wt loss, fevers, chills, night sweats, fatigue  Eyes:  Good vision, no reported pain  ENT:  No sore throat, pain, runny nose, dysphagia  CV:  No pain, palpitations, hypo/hypertension  Pulm:  No dyspnea, cough, tachypnea, wheezing  GI:  No pain, No nausea, No vomiting, No diarrhea, No constipation, No weight loss, No fever, No pruritis, No rectal bleeding, No tarry stools, No dysphagia,  :  No pain, bleeding, incontinence, nocturia  Muscle:  No pain, weakness  Neuro:  No weakness, tingling, memory problems  Psych:  No fatigue, insomnia, mood problems, depression  Endocrine:  No polyuria, polydipsia, cold/heat intolerance  Heme:  No petechiae, ecchymosis, easy bruisability  Skin:  No rash, tattoos, scars, edema    PHYSICAL EXAM:     GENERAL:  No acute distress  HEENT:  Normocephalic/atraumatic, no scleral icterus  CHEST:  Clear to auscultation bilaterally, no wheezes/rales/ronchi, no accessory muscle use  HEART:  Regular rate and rhythm, no murmurs/rubs/gallops  ABDOMEN:  Soft, non-tender, non-distended, normoactive bowel sounds,  no masses, no hepato-splenomegaly, no signs of chronic liver disease  EXTREMITIES: No cyanosis, clubbing, or edema  SKIN:  No rash/erythema/ecchymoses/petechiae/wounds/abscess/warm/dry  NEURO:  Alert and oriented x 3, no asterixis    Vital Signs:  Vital Signs Last 24 Hrs  T(C): 36.4 (05 Aug 2020 14:31), Max: 37.1 (05 Aug 2020 11:20)  T(F): 97.5 (05 Aug 2020 14:31), Max: 98.8 (05 Aug 2020 11:20)  HR: 87 (05 Aug 2020 14:31) (63 - 90)  BP: 152/90 (05 Aug 2020 14:31) (150/95 - 170/76)  BP(mean): --  RR: 17 (05 Aug 2020 14:31) (16 - 18)  SpO2: 99% (05 Aug 2020 14:31) (99% - 100%)  Daily Height in cm: 157.48 (05 Aug 2020 12:37)    Daily     LABS:                        8.3    3.96  )-----------( 186      ( 05 Aug 2020 07:15 )             25.0     Mean Cell Volume: 128.2 fL (08-20 @ 07:15)    08-    138  |  100  |  20  ----------------------------<  91  3.9   |  26  |  1.08    Ca    9.7      05 Aug 2020 07:15  Phos  2.6     08-04  Mg     2.0     08-04    TPro  7.4  /  Alb  3.8  /  TBili  1.2  /  DBili  x   /  AST  14  /  ALT  8   /  AlkPhos  93  08-05    LIVER FUNCTIONS - ( 05 Aug 2020 07:15 )  Alb: 3.8 g/dL / Pro: 7.4 g/dL / ALK PHOS: 93 u/L / ALT: 8 u/L / AST: 14 u/L / GGT: x             Urinalysis Basic - ( 04 Aug 2020 23:56 )    Color: YELLOW / Appearance: Lt TURBID / S.023 / pH: 7.0  Gluc: NEGATIVE / Ketone: SMALL  / Bili: NEGATIVE / Urobili: NORMAL   Blood: NEGATIVE / Protein: SMALL / Nitrite: POSITIVE   Leuk Esterase: LARGE / RBC: 3-5 / WBC 26-50   Sq Epi: x / Non Sq Epi: OCC / Bacteria: LARGE                              8.3    3.96  )-----------( 186      ( 05 Aug 2020 07:15 )             25.0                         8.9    4.37  )-----------( 187      ( 04 Aug 2020 22:00 )             26.3       Imaging: Chief Complaint:  Patient is a 75y old  Female who presents with a chief complaint of CC: Lower extremity cramping/numbness (05 Aug 2020 12:36)      HPI: 74 y/o F w/ Hx of hypothyroidism, vitiligo, DM, CAD, HTN, HLD who p/w leg cramping c/f peripheral neuropathy. Found to have macrocytic anemia w/ B12 deficiency. GI now consulted due to c/f pernicious anemia in the setting of 20lb weight loss.     Pt reports severe leg cramping / pain x 2-3 days, in the setting of cramping leg pain x 3-4 months which brought her to the hospital. She also endorses losing 22lbs recently, loss of appetite x 1.5 months, early satiety. Denies abdominal pain, n/v/d, melena, BRBPR, hematemesis, f/c, cp, sob.     Labs notable for Hb 8.3 w/  and B12 low to 150 w/ normal folate.     CT a/p (20) significant for thrombus within the left common iliac vein, external iliac vein, and visualized femoral vein. Additional thrombus in the right femoral vein.     Allergies:  Apple Juice (Rash)  Carrots (Rash)  No Known Drug Allergies  pork (Rash)      Home Medications:    Hospital Medications:  acetaminophen   Tablet .. 650 milliGRAM(s) Oral every 6 hours PRN  ALBUTerol    90 MICROgram(s) HFA Inhaler 2 Puff(s) Inhalation every 6 hours PRN  aspirin enteric coated 81 milliGRAM(s) Oral daily  budesonide 160 MICROgram(s)/formoterol 4.5 MICROgram(s) Inhaler 2 Puff(s) Inhalation two times a day  cefTRIAXone   IVPB 1000 milliGRAM(s) IV Intermittent every 24 hours  clotrimazole 1% Cream 1 Application(s) Topical two times a day  cyanocobalamin Injectable 1000 MICROGram(s) IntraMuscular daily  dextrose 40% Gel 15 Gram(s) Oral once PRN  dextrose 5%. 1000 milliLiter(s) IV Continuous <Continuous>  dextrose 50% Injectable 12.5 Gram(s) IV Push once  dextrose 50% Injectable 25 Gram(s) IV Push once  dextrose 50% Injectable 25 Gram(s) IV Push once  enalapril 20 milliGRAM(s) Oral two times a day  enoxaparin Injectable 40 milliGRAM(s) SubCutaneous daily  glucagon  Injectable 1 milliGRAM(s) IntraMuscular once PRN  insulin lispro (HumaLOG) corrective regimen sliding scale   SubCutaneous three times a day before meals  insulin lispro (HumaLOG) corrective regimen sliding scale   SubCutaneous at bedtime  levothyroxine 125 MICROGram(s) Oral daily  NIFEdipine XL 30 milliGRAM(s) Oral daily  ondansetron Injectable 4 milliGRAM(s) IV Push every 6 hours PRN      PMHX/PSHX:  Leg swelling  DVT (deep venous thrombosis), right  Hypothyroid  Ventral hernia  Diabetes  Asthma  Essential hypertension  No significant past surgical history      Family history:  FH: diabetes mellitus  No pertinent family history in first degree relatives  No pertinent family history in first degree relatives      Denies family history of colon cancer/polyps, stomach cancer/polyps, pancreatic cancer/masses, liver cancer/disease, ovarian cancer and endometrial cancer.    Social History:     Tob: Denies  EtOH: Denies  Illicit Drugs: Denies    ROS:     General:  No wt loss, fevers, chills, night sweats, fatigue  Eyes:  Good vision, no reported pain  ENT:  No sore throat, pain, runny nose, dysphagia  CV:  No pain, palpitations, hypo/hypertension  Pulm:  No dyspnea, cough, tachypnea, wheezing  GI:  No pain, No nausea, No vomiting, No diarrhea, No constipation, No weight loss, No fever, No pruritis, No rectal bleeding, No tarry stools, No dysphagia,  :  No pain, bleeding, incontinence, nocturia  Muscle:  No pain, weakness  Neuro:  No weakness, tingling, memory problems  Psych:  No fatigue, insomnia, mood problems, depression  Endocrine:  No polyuria, polydipsia, cold/heat intolerance  Heme:  No petechiae, ecchymosis, easy bruisability  Skin:  No rash, tattoos, scars, edema    PHYSICAL EXAM:     GENERAL:  No acute distress, elderly pleasant,   HEENT:  Normocephalic/atraumatic, no scleral icterus, +glossitis   CHEST:  Clear to auscultation bilaterally, no wheezes/rales/ronchi, no accessory muscle use  HEART:  Regular rate and rhythm, no murmurs/rubs/gallops  ABDOMEN:  Soft, non-tender, +mildly distended, normoactive bowel sounds,  no masses, no hepato-splenomegaly, no signs of chronic liver disease  EXTREMITIES: wwp  SKIN: +vitiligo   NEURO:  Alert and oriented x 3    Vital Signs:  Vital Signs Last 24 Hrs  T(C): 36.4 (05 Aug 2020 14:31), Max: 37.1 (05 Aug 2020 11:20)  T(F): 97.5 (05 Aug 2020 14:31), Max: 98.8 (05 Aug 2020 11:20)  HR: 87 (05 Aug 2020 14:31) (63 - 90)  BP: 152/90 (05 Aug 2020 14:) (150/95 - 170/76)  BP(mean): --  RR: 17 (05 Aug 2020 14:31) (16 - 18)  SpO2: 99% (05 Aug 2020 14:31) (99% - 100%)  Daily Height in cm: 157.48 (05 Aug 2020 12:37)    Daily     LABS:                        8.3    3.96  )-----------( 186      ( 05 Aug 2020 07:15 )             25.0     Mean Cell Volume: 128.2 fL (08-20 @ 07:15)    08-    138  |  100  |  20  ----------------------------<  91  3.9   |  26  |  1.08    Ca    9.7      05 Aug 2020 07:15  Phos  2.6     08-04  Mg     2.0     08-04    TPro  7.4  /  Alb  3.8  /  TBili  1.2  /  DBili  x   /  AST  14  /  ALT  8   /  AlkPhos  93  08-05    LIVER FUNCTIONS - ( 05 Aug 2020 07:15 )  Alb: 3.8 g/dL / Pro: 7.4 g/dL / ALK PHOS: 93 u/L / ALT: 8 u/L / AST: 14 u/L / GGT: x             Urinalysis Basic - ( 04 Aug 2020 23:56 )    Color: YELLOW / Appearance: Lt TURBID / S.023 / pH: 7.0  Gluc: NEGATIVE / Ketone: SMALL  / Bili: NEGATIVE / Urobili: NORMAL   Blood: NEGATIVE / Protein: SMALL / Nitrite: POSITIVE   Leuk Esterase: LARGE / RBC: 3-5 / WBC 26-50   Sq Epi: x / Non Sq Epi: OCC / Bacteria: LARGE                              8.3    3.96  )-----------( 186      ( 05 Aug 2020 07:15 )             25.0                         8.9    4.37  )-----------( 187      ( 04 Aug 2020 22:00 )             26.3       Imaging:

## 2020-08-05 NOTE — H&P ADULT - PROBLEM SELECTOR PLAN 8
Given reported 22 lbs in 1-2 weeks weight loss and severely reduced appetite, likely severe malnutrition.  Assess for GI malignancy and manage b12 deficiency as above  -Nutrition eval

## 2020-08-05 NOTE — PHYSICAL THERAPY INITIAL EVALUATION ADULT - GENERAL OBSERVATIONS, REHAB EVAL
Patient was received and left supine in bed with head of bed elevated, call bell in reach and bed alarm activated.

## 2020-08-05 NOTE — PROGRESS NOTE ADULT - ATTENDING COMMENTS
Patient seen and examined, care d/w residents    # Severe B12 def with neuropathy/pernicious anemia: c/w IM B12, PT rec home PT, f/u intrisic factor ab  # Weight loss: f/u CT, GI eval  # DM2: well controlled, can discharge off meformin given may worsen b12 absorption  # polyglandular autoimmune disorder (vitiligo, hypothyroidism, pernicious anemia)  # CAD s/p remote PCI: c/w asa Patient seen and examined, care d/w residents    # Severe B12 def with neuropathy/pernicious anemia: c/w IM B12, PT rec home PT, f/u intrisic factor ab in lab  # Weight loss/Severe protein-mone malnutrition: f/u CT, GI eval  # UTI: f/u urine cx, c/w ctx   # DM2: well controlled, can discharge off meformin given may worsen b12 absorption  # polyglandular autoimmune disorder (vitiligo, hypothyroidism, pernicious anemia)  # CAD s/p remote PCI: c/w asa

## 2020-08-05 NOTE — H&P ADULT - PROBLEM SELECTOR PLAN 3
-Anemia likely related to B12 deficiency  -Folate wnl  -Will also check iron panel.  B12 supplementation as above

## 2020-08-05 NOTE — DISCHARGE NOTE NURSING/CASE MANAGEMENT/SOCIAL WORK - NSSCNAMETXT_GEN_ALL_CORE
[facility] [phone].  Nurse to visit on the day after discharge.  Other appropriate services to be arranged thereafter.

## 2020-08-05 NOTE — PHYSICAL THERAPY INITIAL EVALUATION ADULT - DISCHARGE DISPOSITION, PT EVAL
Home Physical Therapy for Home Safety Evaluation & Functional Mobility Training at this point; Follow progress with physical therapy.

## 2020-08-05 NOTE — PROGRESS NOTE ADULT - SUBJECTIVE AND OBJECTIVE BOX
PROGRESS NOTE:   Authored by Wai Zayas MD  PGY-1, Internal Medicine  Pager [facility] [phone], J [id number]     Patient is a 75y old  Female who presents with a chief complaint of CC: Lower extremity cramping/numbness (05 Aug 2020 02:12)      SUBJECTIVE / OVERNIGHT EVENTS: No events overnight.    ADDITIONAL REVIEW OF SYSTEMS: Denies fevers, chills, n/v.    MEDICATIONS  (STANDING):  aspirin enteric coated 81 milliGRAM(s) Oral daily  budesonide 160 MICROgram(s)/formoterol 4.5 MICROgram(s) Inhaler 2 Puff(s) Inhalation two times a day  cefTRIAXone   IVPB 1000 milliGRAM(s) IV Intermittent every 24 hours  clotrimazole 1% Cream 1 Application(s) Topical two times a day  cyanocobalamin Injectable 1000 MICROGram(s) IntraMuscular daily  dextrose 5%. 1000 milliLiter(s) (50 mL/Hr) IV Continuous <Continuous>  dextrose 50% Injectable 12.5 Gram(s) IV Push once  dextrose 50% Injectable 25 Gram(s) IV Push once  dextrose 50% Injectable 25 Gram(s) IV Push once  enalapril 20 milliGRAM(s) Oral two times a day  enoxaparin Injectable 40 milliGRAM(s) SubCutaneous daily  insulin lispro (HumaLOG) corrective regimen sliding scale   SubCutaneous three times a day before meals  insulin lispro (HumaLOG) corrective regimen sliding scale   SubCutaneous at bedtime  levothyroxine 125 MICROGram(s) Oral daily  NIFEdipine XL 30 milliGRAM(s) Oral daily    MEDICATIONS  (PRN):  acetaminophen   Tablet .. 650 milliGRAM(s) Oral every 6 hours PRN Mild Pain (1 - 3), Moderate Pain (4 - 6), Severe Pain (7 - 10)  ALBUTerol    90 MICROgram(s) HFA Inhaler 2 Puff(s) Inhalation every 6 hours PRN Shortness of Breath and/or Wheezing  dextrose 40% Gel 15 Gram(s) Oral once PRN Blood Glucose LESS THAN 70 milliGRAM(s)/deciliter  glucagon  Injectable 1 milliGRAM(s) IntraMuscular once PRN Glucose LESS THAN 70 milligrams/deciliter  ondansetron Injectable 4 milliGRAM(s) IV Push every 6 hours PRN Nausea      CAPILLARY BLOOD GLUCOSE      POCT Blood Glucose.: 84 mg/dL (05 Aug 2020 12:23)  POCT Blood Glucose.: 87 mg/dL (05 Aug 2020 08:04)  POCT Blood Glucose.: 101 mg/dL (04 Aug 2020 21:56)  POCT Blood Glucose.: 111 mg/dL (04 Aug 2020 20:39)    I&O's Summary      PHYSICAL EXAM:  Vital Signs Last 24 Hrs  T(C): 37.1 (05 Aug 2020 11:20), Max: 37.1 (05 Aug 2020 11:20)  T(F): 98.8 (05 Aug 2020 11:20), Max: 98.8 (05 Aug 2020 11:20)  HR: 70 (05 Aug 2020 11:20) (63 - 90)  BP: 158/68 (05 Aug 2020 11:20) (150/95 - 170/76)  BP(mean): --  RR: 17 (05 Aug 2020 11:20) (16 - 18)  SpO2: 100% (05 Aug 2020 11:20) (99% - 100%)    CONSTITUTIONAL: NAD, lying in bed comfortably  RESPIRATORY: Normal respiratory effort; CTABL  CARDIOVASCULAR: Regular rate and rhythm, normal S1 and S2, no murmur/rub/gallop  ABDOMEN: Soft, nondistended, nontender to palpation, normoactive bowel sounds, no rebound/guarding  MUSCLOSKELETAL: no joint swelling or tenderness to palpation, FROM all extremities  NEURO: AAOx3 to person, place, and time, full and equal strength all extremities   EXTREMITIES: no pedal edema    LABS:                        8.3    3.96  )-----------( 186      ( 05 Aug 2020 07:15 )             25.0     08-05    138  |  100  |  20  ----------------------------<  91  3.9   |  26  |  1.08    Ca    9.7      05 Aug 2020 07:15  Phos  2.6     08-04  Mg     2.0     08-04    TPro  7.4  /  Alb  3.8  /  TBili  1.2  /  DBili  x   /  AST  14  /  ALT  8   /  AlkPhos  93  08-05          Urinalysis Basic - ( 04 Aug 2020 23:56 )    Color: YELLOW / Appearance: Lt TURBID / S.023 / pH: 7.0  Gluc: NEGATIVE / Ketone: SMALL  / Bili: NEGATIVE / Urobili: NORMAL   Blood: NEGATIVE / Protein: SMALL / Nitrite: POSITIVE   Leuk Esterase: LARGE / RBC: 3-5 / WBC 26-50   Sq Epi: x / Non Sq Epi: OCC / Bacteria: LARGE          RADIOLOGY & ADDITIONAL TESTS: PROGRESS NOTE:   Authored by Wai Zayas MD  PGY-1, Internal Medicine  Pager [facility] [phone], J [id number]     Patient is a 75y old  Female who presents with a chief complaint of CC: Lower extremity cramping/numbness (05 Aug 2020 02:12)    SUBJECTIVE / OVERNIGHT EVENTS: No events overnight.    ADDITIONAL REVIEW OF SYSTEMS: Denies fevers, chills, n/v.    MEDICATIONS  (STANDING):  aspirin enteric coated 81 milliGRAM(s) Oral daily  budesonide 160 MICROgram(s)/formoterol 4.5 MICROgram(s) Inhaler 2 Puff(s) Inhalation two times a day  cefTRIAXone   IVPB 1000 milliGRAM(s) IV Intermittent every 24 hours  clotrimazole 1% Cream 1 Application(s) Topical two times a day  cyanocobalamin Injectable 1000 MICROGram(s) IntraMuscular daily  enalapril 20 milliGRAM(s) Oral two times a day  enoxaparin Injectable 40 milliGRAM(s) SubCutaneous daily  insulin lispro (HumaLOG) corrective regimen sliding scale   SubCutaneous three times a day before meals  insulin lispro (HumaLOG) corrective regimen sliding scale   SubCutaneous at bedtime  levothyroxine 125 MICROGram(s) Oral daily  NIFEdipine XL 30 milliGRAM(s) Oral daily    MEDICATIONS  (PRN):  acetaminophen   Tablet .. 650 milliGRAM(s) Oral every 6 hours PRN Mild Pain (1 - 3), Moderate Pain (4 - 6), Severe Pain (7 - 10)  ALBUTerol    90 MICROgram(s) HFA Inhaler 2 Puff(s) Inhalation every 6 hours PRN Shortness of Breath and/or Wheezing  dextrose 40% Gel 15 Gram(s) Oral once PRN Blood Glucose LESS THAN 70 milliGRAM(s)/deciliter  glucagon  Injectable 1 milliGRAM(s) IntraMuscular once PRN Glucose LESS THAN 70 milligrams/deciliter  ondansetron Injectable 4 milliGRAM(s) IV Push every 6 hours PRN Nausea    CAPILLARY BLOOD GLUCOSE  POCT Blood Glucose.: 84 mg/dL (05 Aug 2020 12:23)  POCT Blood Glucose.: 87 mg/dL (05 Aug 2020 08:04)  POCT Blood Glucose.: 101 mg/dL (04 Aug 2020 21:56)  POCT Blood Glucose.: 111 mg/dL (04 Aug 2020 20:39)    PHYSICAL EXAM:  Vital Signs Last 24 Hrs  T(C): 37.1 (05 Aug 2020 11:20), Max: 37.1 (05 Aug 2020 11:20)  T(F): 98.8 (05 Aug 2020 11:20), Max: 98.8 (05 Aug 2020 11:20)  HR: 70 (05 Aug 2020 11:20) (63 - 90)  BP: 158/68 (05 Aug 2020 11:20) (150/95 - 170/76)  BP(mean): --  RR: 17 (05 Aug 2020 11:20) (16 - 18)  SpO2: 100% (05 Aug 2020 11:20) (99% - 100%)    CONSTITUTIONAL: NAD, lying in bed comfortably  RESPIRATORY: Normal respiratory effort; CTABL  CARDIOVASCULAR: Regular rate and rhythm, normal S1 and S2, no murmur/rub/gallop  ABDOMEN: Soft, nondistended, nontender to palpation, normoactive bowel sounds, no rebound/guarding  MUSCLOSKELETAL: no joint swelling or tenderness to palpation, FROM all extremities  NEURO: AAOx3 to person, place, and time, full and equal strength all extremities   EXTREMITIES: no pedal edema    LABS:                        8.3    3.96  )-----------( 186      ( 05 Aug 2020 07:15 )             25.0     08-05    138  |  100  |  20  ----------------------------<  91  3.9   |  26  |  1.08    Ca    9.7      05 Aug 2020 07:15  Phos  2.6     08-04  Mg     2.0     08-04    TPro  7.4  /  Alb  3.8  /  TBili  1.2  /  DBili  x   /  AST  14  /  ALT  8   /  AlkPhos  93  08-05      Urinalysis Basic - ( 04 Aug 2020 23:56 )  Color: YELLOW / Appearance: Lt TURBID / S.023 / pH: 7.0  Gluc: NEGATIVE / Ketone: SMALL  / Bili: NEGATIVE / Urobili: NORMAL   Blood: NEGATIVE / Protein: SMALL / Nitrite: POSITIVE   Leuk Esterase: LARGE / RBC: 3-5 / WBC 26-50   Sq Epi: x / Non Sq Epi: OCC / Bacteria: LARGE PROGRESS NOTE:   Authored by Wai Zayas MD  PGY-1, Internal Medicine  Pager [facility] [phone], J [id number]     Patient is a 75y old  Female who presents with a chief complaint of CC: Lower extremity cramping/numbness (05 Aug 2020 02:12)    SUBJECTIVE / OVERNIGHT EVENTS: Patient speaks English but is more comfortable with Malay. No events overnight. Patient is concerned about the weakness in her legs which is affecting her mood and appetite.     ADDITIONAL REVIEW OF SYSTEMS: Denies fevers, chills, n/v.    MEDICATIONS  (STANDING):  aspirin enteric coated 81 milliGRAM(s) Oral daily  budesonide 160 MICROgram(s)/formoterol 4.5 MICROgram(s) Inhaler 2 Puff(s) Inhalation two times a day  cefTRIAXone   IVPB 1000 milliGRAM(s) IV Intermittent every 24 hours  clotrimazole 1% Cream 1 Application(s) Topical two times a day  cyanocobalamin Injectable 1000 MICROGram(s) IntraMuscular daily  enalapril 20 milliGRAM(s) Oral two times a day  enoxaparin Injectable 40 milliGRAM(s) SubCutaneous daily  insulin lispro (HumaLOG) corrective regimen sliding scale   SubCutaneous three times a day before meals  insulin lispro (HumaLOG) corrective regimen sliding scale   SubCutaneous at bedtime  levothyroxine 125 MICROGram(s) Oral daily  NIFEdipine XL 30 milliGRAM(s) Oral daily    MEDICATIONS  (PRN):  acetaminophen   Tablet .. 650 milliGRAM(s) Oral every 6 hours PRN Mild Pain (1 - 3), Moderate Pain (4 - 6), Severe Pain (7 - 10)  ALBUTerol    90 MICROgram(s) HFA Inhaler 2 Puff(s) Inhalation every 6 hours PRN Shortness of Breath and/or Wheezing  dextrose 40% Gel 15 Gram(s) Oral once PRN Blood Glucose LESS THAN 70 milliGRAM(s)/deciliter  glucagon  Injectable 1 milliGRAM(s) IntraMuscular once PRN Glucose LESS THAN 70 milligrams/deciliter  ondansetron Injectable 4 milliGRAM(s) IV Push every 6 hours PRN Nausea    CAPILLARY BLOOD GLUCOSE  POCT Blood Glucose.: 84 mg/dL (05 Aug 2020 12:23)  POCT Blood Glucose.: 87 mg/dL (05 Aug 2020 08:04)  POCT Blood Glucose.: 101 mg/dL (04 Aug 2020 21:56)  POCT Blood Glucose.: 111 mg/dL (04 Aug 2020 20:39)    PHYSICAL EXAM:  Vital Signs Last 24 Hrs  T(C): 37.1 (05 Aug 2020 11:20), Max: 37.1 (05 Aug 2020 11:20)  T(F): 98.8 (05 Aug 2020 11:20), Max: 98.8 (05 Aug 2020 11:20)  HR: 70 (05 Aug 2020 11:20) (63 - 90)  BP: 158/68 (05 Aug 2020 11:20) (150/95 - 170/76)  BP(mean): --  RR: 17 (05 Aug 2020 11:20) (16 - 18)  SpO2: 100% (05 Aug 2020 11:20) (99% - 100%)    CONSTITUTIONAL: NAD, lying in bed comfortably  RESPIRATORY: Normal respiratory effort; CTABL  CARDIOVASCULAR: Regular rate and rhythm, normal S1 and S2, no murmur/rub/gallop  ABDOMEN: Soft, nondistended, nontender to palpation, normoactive bowel sounds, no rebound/guarding  MUSCLOSKELETAL: no joint swelling or tenderness to palpation, FROM all extremities  NEURO: AAOx3 to person, place, and time, full and equal strength all extremities   EXTREMITIES: no pedal edema    LABS:                        8.3    3.96  )-----------( 186      ( 05 Aug 2020 07:15 )             25.0     08-05    138  |  100  |  20  ----------------------------<  91  3.9   |  26  |  1.08    Ca    9.7      05 Aug 2020 07:15  Phos  2.6     08-04  Mg     2.0     08-04    TPro  7.4  /  Alb  3.8  /  TBili  1.2  /  DBili  x   /  AST  14  /  ALT  8   /  AlkPhos  93  08-05      Urinalysis Basic - ( 04 Aug 2020 23:56 )  Color: YELLOW / Appearance: Lt TURBID / S.023 / pH: 7.0  Gluc: NEGATIVE / Ketone: SMALL  / Bili: NEGATIVE / Urobili: NORMAL   Blood: NEGATIVE / Protein: SMALL / Nitrite: POSITIVE   Leuk Esterase: LARGE / RBC: 3-5 / WBC 26-50   Sq Epi: x / Non Sq Epi: OCC / Bacteria: LARGE PROGRESS NOTE:   Authored by Wai Zayas MD  PGY-1, Internal Medicine  Pager [facility] [phone], J [id number]     Patient is a 75y old  Female who presents with a chief complaint of CC: Lower extremity cramping/numbness (05 Aug 2020 02:12)    SUBJECTIVE / OVERNIGHT EVENTS: Patient speaks English but is more comfortable with Yakut. No events overnight. Patient is concerned about the weakness in her legs which is affecting her mood and appetite. Patient also complains of rash under her breasts, which she says is very uncomfortable. PT following patient, but patient is hesitant to ambulate given weakness in her legs. Patient denies chest pain, SOB, dysphagia, nausea, vomiting.     ADDITIONAL REVIEW OF SYSTEMS: Denies fevers, chills, n/v.    MEDICATIONS  (STANDING):  aspirin enteric coated 81 milliGRAM(s) Oral daily  budesonide 160 MICROgram(s)/formoterol 4.5 MICROgram(s) Inhaler 2 Puff(s) Inhalation two times a day  cefTRIAXone   IVPB 1000 milliGRAM(s) IV Intermittent every 24 hours  clotrimazole 1% Cream 1 Application(s) Topical two times a day  cyanocobalamin Injectable 1000 MICROGram(s) IntraMuscular daily  enalapril 20 milliGRAM(s) Oral two times a day  enoxaparin Injectable 40 milliGRAM(s) SubCutaneous daily  insulin lispro (HumaLOG) corrective regimen sliding scale   SubCutaneous three times a day before meals  insulin lispro (HumaLOG) corrective regimen sliding scale   SubCutaneous at bedtime  levothyroxine 125 MICROGram(s) Oral daily  NIFEdipine XL 30 milliGRAM(s) Oral daily    MEDICATIONS  (PRN):  acetaminophen   Tablet .. 650 milliGRAM(s) Oral every 6 hours PRN Mild Pain (1 - 3), Moderate Pain (4 - 6), Severe Pain (7 - 10)  ALBUTerol    90 MICROgram(s) HFA Inhaler 2 Puff(s) Inhalation every 6 hours PRN Shortness of Breath and/or Wheezing  dextrose 40% Gel 15 Gram(s) Oral once PRN Blood Glucose LESS THAN 70 milliGRAM(s)/deciliter  glucagon  Injectable 1 milliGRAM(s) IntraMuscular once PRN Glucose LESS THAN 70 milligrams/deciliter  ondansetron Injectable 4 milliGRAM(s) IV Push every 6 hours PRN Nausea    CAPILLARY BLOOD GLUCOSE  POCT Blood Glucose.: 84 mg/dL (05 Aug 2020 12:23)  POCT Blood Glucose.: 87 mg/dL (05 Aug 2020 08:04)  POCT Blood Glucose.: 101 mg/dL (04 Aug 2020 21:56)  POCT Blood Glucose.: 111 mg/dL (04 Aug 2020 20:39)    PHYSICAL EXAM:  Vital Signs Last 24 Hrs  T(C): 37.1 (05 Aug 2020 11:20), Max: 37.1 (05 Aug 2020 11:20)  T(F): 98.8 (05 Aug 2020 11:20), Max: 98.8 (05 Aug 2020 11:20)  HR: 70 (05 Aug 2020 11:) (63 - 90)  BP: 158/68 (05 Aug 2020 11:20) (150/95 - 170/76)  BP(mean): --  RR: 17 (05 Aug 2020 11:20) (16 - 18)  SpO2: 100% (05 Aug 2020 11:20) (99% - 100%)    CONSTITUTIONAL: NAD, lying in bed comfortably  RESPIRATORY: Normal respiratory effort; CTABL  CARDIOVASCULAR: Regular rate and rhythm, normal S1 and S2, no murmur/rub/gallop  ABDOMEN: Soft, nondistended, nontender to palpation, normoactive bowel sounds, no rebound/guarding, Rash under breasts  MUSCLOSKELETAL: no joint swelling or tenderness to palpation, FROM all extremities. 4+ strength in legs, 5+ strength in arms.   NEURO: AAOx3 to person, place, and time, full and equal strength all extremities   EXTREMITIES: no pedal edema. Patchy areas of hypopigmentation in dorsal hands and feet.     LABS:                        8.3    3.96  )-----------( 186      ( 05 Aug 2020 07:15 )             25.0     08-05    138  |  100  |  20  ----------------------------<  91  3.9   |  26  |  1.08    Ca    9.7      05 Aug 2020 07:15  Phos  2.6     08-04  Mg     2.0     08-04    TPro  7.4  /  Alb  3.8  /  TBili  1.2  /  DBili  x   /  AST  14  /  ALT  8   /  AlkPhos  93  08-05      Urinalysis Basic - ( 04 Aug 2020 23:56 )  Color: YELLOW / Appearance: Lt TURBID / S.023 / pH: 7.0  Gluc: NEGATIVE / Ketone: SMALL  / Bili: NEGATIVE / Urobili: NORMAL   Blood: NEGATIVE / Protein: SMALL / Nitrite: POSITIVE   Leuk Esterase: LARGE / RBC: 3-5 / WBC 26-50   Sq Epi: x / Non Sq Epi: OCC / Bacteria: LARGE

## 2020-08-05 NOTE — PROGRESS NOTE ADULT - PROBLEM SELECTOR PLAN 1
-Suspect b/l lower extremity numbness and tingling is related to B12 deficiency  -Uncertain etiology of B12 deficiency - possibly intrinsic factor deficiency.  Denies a voluntary dietary restriction that might cause deficiency.  Does have vitiligo which may suggest autoimmune dysfunction.  Metformin also interferes with B12  -Given neurologic symptoms, will supplement IM to start with 1000 micrograms now and then approximately every 2 days.    -Will also check copper, RPR, homocysteine, IF antibodies    Endorses dysuria - UA concerning for UTI.  Ceftriaxone pending urine cultures.

## 2020-08-05 NOTE — PHARMACOTHERAPY INTERVENTION NOTE - COMMENTS
Medication history is complete. Medication list updated in Outpatient Medication Record (OMR). Medication history obtained from patient's outpatient pharmacy ([facility]) as patient unable to recall list at bedside. Unable to further confirm with secondary sources (no answer from emergency contacts). See provider handoff for additional notes. Please call spectra [phone] if you have any questions.

## 2020-08-05 NOTE — H&P ADULT - PROBLEM SELECTOR PLAN 2
-Uncertain etiology of recent weight loss and poor PO intake - possibly related to B12 deficiency  -Will obtain CT a/p to evaluate for potential GI malignancy given if she does have pernicious anemia this increases risk for gastric cancer -Uncertain etiology of recent weight loss and poor PO intake - possibly related to B12 deficiency  -Will obtain CT a/p to evaluate for potential GI malignancy given if she does have pernicious anemia this increases risk for gastric cancer  -Consider GI evaluation

## 2020-08-05 NOTE — PATIENT PROFILE ADULT - NSPRONUTRITIONRISK_GEN_A_NUR
Unintentional weight loss prior to admission/Significant decrease of oral intake greater than 3 days prior to admission

## 2020-08-05 NOTE — DIETITIAN INITIAL EVALUATION ADULT. - ADD RECOMMEND
1. Encourage & assist Pt with meals; Monitor PO diet tolerance; Honor food preferences;          2. Add Multivitamins with minerals 1 tab daily for micronutrient coverage;       3. Continue Vit B12 supplement per MD discretion;        4. Monitor labs, hydration status;

## 2020-08-05 NOTE — DIETITIAN INITIAL EVALUATION ADULT. - OTHER INFO
Pt 74 yo female with PMHx of DM, prior DVT, HTN, CAD presented with b/l lower extremity numbness and pain - per chart review. Of note Pt with B12 deficiency, Pt reports poor PO intake and significant weight loss - PTA. At time of visit Pt appears alert, oriented. Per Pt her appetite has been poor for past "one month and two weeks"; Pt also stated she lost weight ~23# during that time frame. Per Pt her height: ~62" & her usual body weight: ~162#. Food preferences discussed; Pt does not like beef or any other kind of meats, Pt likes Vegetable soup reported. Pt agreed to try PO supplement: Glucerna Shake. No report of chewing or swallowing difficulty; no report of nausea, vomiting or diarrhea @ this time. Food allergies confirmed with Pt. No other food related concerns voiced @ present. Case discussed with nurse. RDN remains available, Pt made aware.

## 2020-08-05 NOTE — H&P ADULT - ASSESSMENT
75 year old woman with a past medical history of HTN, HLD, CAD s/p MI, T2DM, Asthma, reported hx of dVT who presents for b/l LE cramping pain and numbness.

## 2020-08-05 NOTE — CONSULT NOTE ADULT - ASSESSMENT
Impression:   #Macrocytic anemia 2/2 B12 deficiency:       Recommendations:   - Please send off intrinsic factor ab  - Would initiate B12 replacement at this time, per primary team   - Trend CBC daily, transfuse Hb < 7  - Make NPO at MN for potential EGD tomorrow  - Tentatively planned for EGD tomorrow       Thank you for involving us in the care of this patient, please reach out if any further questions.     Diomedes Arguelles MD  Gastroenterology Fellow, PGY4    Available on Microsoft Teams  [phone] ([facility])  [id number] ([facility])  Please contact on call fellow weekdays after 5pm-7am and weekends: [phone] Impression:   #Macrocytic anemia 2/2 B12 deficiency:     Recommendations:   - Please send off intrinsic factor ab  - Would initiate B12 replacement at this time, per primary team   - Trend CBC daily, transfuse Hb < 7  - Make NPO at MN for potential EGD tomorrow  - Tentatively planned for EGD tomorrow pending CT findings and if patient amenable      Thank you for involving us in the care of this patient, please reach out if any further questions.     Diomedes Arguelles MD  Gastroenterology Fellow, PGY4    Available on Microsoft Teams  [phone] ([facility])  [phone] ([facility])  Please contact on call fellow weekdays after 5pm-7am and weekends: [phone] Impression:   #Macrocytic anemia 2/2 B12 deficiency: pt w/ Hb 8.3 w/ MCV 120s w/ low B12 (150s). Also endorsing Hx of 22lb weight loss, loss of appetite, early satiety which are concerning warning signs. Ddx would consider atrophic gastritis potentially 2/2 pernicious anemia vs malignancy vs poor po intake.   #DVT: pt w/ multiple thrombi in left common iliac vein, external illiac vein, and femoral vein.         Recommendations:   - Please send off intrinsic factor ab  - Would initiate B12 replacement at this time, per primary team   - Trend CBC daily, transfuse Hb < 7  - Please consider heparin gtt (hold at 6AM prior to EGD)   - Make NPO at MN for potential EGD tomorrow  - Tentatively planned for EGD tomorrow pending CT findings and if patient amenable      Thank you for involving us in the care of this patient, please reach out if any further questions.     Diomedes Arguelles MD  Gastroenterology Fellow, PGY4    Available on Microsoft Teams  [phone] ([facility])  [phone] ([facility])  Please contact on call fellow weekdays after 5pm-7am and weekends: [phone]

## 2020-08-05 NOTE — H&P ADULT - NSHPREVIEWOFSYSTEMS_GEN_ALL_CORE
ROS:  Constitutional:  (+) unintentional weight loss,(-) fevers, chills, sweats,  fatigue, sick contacts, recent travel, trauma  Ears/Nose/Mouth/Throat: (+) none; (-) throat pain, rhinorrhea, dysphagia/odynophagia  CV: (+) none; (-) chest pain, palpitations, lower extremity edema, orthopnea, PND  Resp: (+) none; (-) shortness of breath, cough  GI: (+) decreased appetite (-) abdominal pain, nausea, vomiting, diarrhea, constipation, melana, hematochezia  : (+) dysuria, (-) hematuria  MSK: (+) cramping pain in lower extremities (-) joint pain, joint swelling  Skin: (+) rash, (-) new yellowing/darkening of skin  Neuro: (+) lightheadedness/dizziness, numbness and tingling (-) HA, vision changes, weakness, confusion,   Endo: (+) none; (-) heat/cold intolerance, recent skin/hair/nail changes  Heme/Lymph: (+) none; (-) swollen lymph nodes, night sweats

## 2020-08-05 NOTE — DISCHARGE NOTE NURSING/CASE MANAGEMENT/SOCIAL WORK - PATIENT PORTAL LINK FT
You can access the FollowMyHealth Patient Portal offered by [facility] by registering at the following website: [email or web address]. By joining [employer]s FollowMyHealth portal, you will also be able to view your health information using other applications (apps) compatible with our system.

## 2020-08-05 NOTE — PHYSICAL THERAPY INITIAL EVALUATION ADULT - LEVEL OF CONSCIOUSNESS, REHAB EVAL
Removal of Neuro Stimulator confirmed by Shunt Series Xrays read by DR Valdes. Only  shunt remaining at this time.    alert

## 2020-08-06 ENCOUNTER — TRANSCRIPTION ENCOUNTER (OUTPATIENT)
Age: 75
End: 2020-08-06

## 2020-08-06 ENCOUNTER — RESULT REVIEW (OUTPATIENT)
Age: 75
End: 2020-08-06

## 2020-08-06 LAB
ALBUMIN SERPL ELPH-MCNC: 3.8 G/DL — SIGNIFICANT CHANGE UP (ref 3.3–5)
ALP SERPL-CCNC: 95 U/L — SIGNIFICANT CHANGE UP (ref 40–120)
ALT FLD-CCNC: 8 U/L — SIGNIFICANT CHANGE UP (ref 4–33)
ANION GAP SERPL CALC-SCNC: 13 MMO/L — SIGNIFICANT CHANGE UP (ref 7–14)
ANION GAP SERPL CALC-SCNC: 15 MMO/L — HIGH (ref 7–14)
APTT BLD: 81.5 SEC — HIGH (ref 27–36.3)
AST SERPL-CCNC: 14 U/L — SIGNIFICANT CHANGE UP (ref 4–32)
BASOPHILS # BLD AUTO: 0.02 K/UL — SIGNIFICANT CHANGE UP (ref 0–0.2)
BASOPHILS NFR BLD AUTO: 0.3 % — SIGNIFICANT CHANGE UP (ref 0–2)
BILIRUB SERPL-MCNC: 1 MG/DL — SIGNIFICANT CHANGE UP (ref 0.2–1.2)
BUN SERPL-MCNC: 18 MG/DL — SIGNIFICANT CHANGE UP (ref 7–23)
BUN SERPL-MCNC: 21 MG/DL — SIGNIFICANT CHANGE UP (ref 7–23)
CALCIUM SERPL-MCNC: 10.6 MG/DL — HIGH (ref 8.4–10.5)
CALCIUM SERPL-MCNC: 9.7 MG/DL — SIGNIFICANT CHANGE UP (ref 8.4–10.5)
CHLORIDE SERPL-SCNC: 98 MMOL/L — SIGNIFICANT CHANGE UP (ref 98–107)
CHLORIDE SERPL-SCNC: 99 MMOL/L — SIGNIFICANT CHANGE UP (ref 98–107)
CO2 SERPL-SCNC: 22 MMOL/L — SIGNIFICANT CHANGE UP (ref 22–31)
CO2 SERPL-SCNC: 24 MMOL/L — SIGNIFICANT CHANGE UP (ref 22–31)
CREAT SERPL-MCNC: 1 MG/DL — SIGNIFICANT CHANGE UP (ref 0.5–1.3)
CREAT SERPL-MCNC: 1.12 MG/DL — SIGNIFICANT CHANGE UP (ref 0.5–1.3)
EOSINOPHIL # BLD AUTO: 0.44 K/UL — SIGNIFICANT CHANGE UP (ref 0–0.5)
EOSINOPHIL NFR BLD AUTO: 6.7 % — HIGH (ref 0–6)
GLUCOSE BLDC GLUCOMTR-MCNC: 70 MG/DL — SIGNIFICANT CHANGE UP (ref 70–99)
GLUCOSE BLDC GLUCOMTR-MCNC: 74 MG/DL — SIGNIFICANT CHANGE UP (ref 70–99)
GLUCOSE BLDC GLUCOMTR-MCNC: 77 MG/DL — SIGNIFICANT CHANGE UP (ref 70–99)
GLUCOSE BLDC GLUCOMTR-MCNC: 91 MG/DL — SIGNIFICANT CHANGE UP (ref 70–99)
GLUCOSE BLDC GLUCOMTR-MCNC: 99 MG/DL — SIGNIFICANT CHANGE UP (ref 70–99)
GLUCOSE SERPL-MCNC: 113 MG/DL — HIGH (ref 70–99)
GLUCOSE SERPL-MCNC: 88 MG/DL — SIGNIFICANT CHANGE UP (ref 70–99)
HCT VFR BLD CALC: 22.8 % — LOW (ref 34.5–45)
HCT VFR BLD CALC: 25.3 % — LOW (ref 34.5–45)
HCV AB S/CO SERPL IA: 0.11 S/CO — SIGNIFICANT CHANGE UP (ref 0–0.99)
HCV AB SERPL-IMP: SIGNIFICANT CHANGE UP
HGB BLD-MCNC: 7.9 G/DL — LOW (ref 11.5–15.5)
HGB BLD-MCNC: 8.4 G/DL — LOW (ref 11.5–15.5)
HIV COMBO RESULT: SIGNIFICANT CHANGE UP
HIV1+2 AB SPEC QL: SIGNIFICANT CHANGE UP
IF BLOCK AB SER-ACNC: SIGNIFICANT CHANGE UP
IMM GRANULOCYTES NFR BLD AUTO: 0.8 % — SIGNIFICANT CHANGE UP (ref 0–1.5)
INR BLD: 1.21 — HIGH (ref 0.88–1.16)
LYMPHOCYTES # BLD AUTO: 3.46 K/UL — HIGH (ref 1–3.3)
LYMPHOCYTES # BLD AUTO: 52.9 % — HIGH (ref 13–44)
MAGNESIUM SERPL-MCNC: 1.8 MG/DL — SIGNIFICANT CHANGE UP (ref 1.6–2.6)
MAGNESIUM SERPL-MCNC: 2 MG/DL — SIGNIFICANT CHANGE UP (ref 1.6–2.6)
MCHC RBC-ENTMCNC: 33.2 % — SIGNIFICANT CHANGE UP (ref 32–36)
MCHC RBC-ENTMCNC: 34.6 % — SIGNIFICANT CHANGE UP (ref 32–36)
MCHC RBC-ENTMCNC: 42.2 PG — HIGH (ref 27–34)
MCHC RBC-ENTMCNC: 42.4 PG — HIGH (ref 27–34)
MCV RBC AUTO: 121.9 FL — HIGH (ref 80–100)
MCV RBC AUTO: 127.8 FL — HIGH (ref 80–100)
MONOCYTES # BLD AUTO: 0.18 K/UL — SIGNIFICANT CHANGE UP (ref 0–0.9)
MONOCYTES NFR BLD AUTO: 2.8 % — SIGNIFICANT CHANGE UP (ref 2–14)
NEUTROPHILS # BLD AUTO: 2.39 K/UL — SIGNIFICANT CHANGE UP (ref 1.8–7.4)
NEUTROPHILS NFR BLD AUTO: 36.5 % — LOW (ref 43–77)
NRBC # FLD: 0.03 K/UL — SIGNIFICANT CHANGE UP (ref 0–0)
NRBC # FLD: 0.04 K/UL — SIGNIFICANT CHANGE UP (ref 0–0)
PHOSPHATE SERPL-MCNC: 2.5 MG/DL — SIGNIFICANT CHANGE UP (ref 2.5–4.5)
PHOSPHATE SERPL-MCNC: 2.6 MG/DL — SIGNIFICANT CHANGE UP (ref 2.5–4.5)
PLATELET # BLD AUTO: 181 K/UL — SIGNIFICANT CHANGE UP (ref 150–400)
PLATELET # BLD AUTO: 198 K/UL — SIGNIFICANT CHANGE UP (ref 150–400)
PMV BLD: 11 FL — SIGNIFICANT CHANGE UP (ref 7–13)
PMV BLD: 11.8 FL — SIGNIFICANT CHANGE UP (ref 7–13)
POTASSIUM SERPL-MCNC: 3.3 MMOL/L — LOW (ref 3.5–5.3)
POTASSIUM SERPL-MCNC: 3.8 MMOL/L — SIGNIFICANT CHANGE UP (ref 3.5–5.3)
POTASSIUM SERPL-SCNC: 3.3 MMOL/L — LOW (ref 3.5–5.3)
POTASSIUM SERPL-SCNC: 3.8 MMOL/L — SIGNIFICANT CHANGE UP (ref 3.5–5.3)
PROT SERPL-MCNC: 7.8 G/DL — SIGNIFICANT CHANGE UP (ref 6–8.3)
PROTHROM AB SERPL-ACNC: 13.7 SEC — HIGH (ref 10.6–13.6)
RBC # BLD: 1.87 M/UL — LOW (ref 3.8–5.2)
RBC # BLD: 1.98 M/UL — LOW (ref 3.8–5.2)
RBC # FLD: 14.1 % — SIGNIFICANT CHANGE UP (ref 10.3–14.5)
RBC # FLD: 14.5 % — SIGNIFICANT CHANGE UP (ref 10.3–14.5)
SODIUM SERPL-SCNC: 134 MMOL/L — LOW (ref 135–145)
SODIUM SERPL-SCNC: 137 MMOL/L — SIGNIFICANT CHANGE UP (ref 135–145)
T4 FREE SERPL-MCNC: 1.27 NG/DL — SIGNIFICANT CHANGE UP (ref 0.9–1.8)
WBC # BLD: 4.92 K/UL — SIGNIFICANT CHANGE UP (ref 3.8–10.5)
WBC # BLD: 6.54 K/UL — SIGNIFICANT CHANGE UP (ref 3.8–10.5)
WBC # FLD AUTO: 4.92 K/UL — SIGNIFICANT CHANGE UP (ref 3.8–10.5)
WBC # FLD AUTO: 6.54 K/UL — SIGNIFICANT CHANGE UP (ref 3.8–10.5)

## 2020-08-06 PROCEDURE — 88342 IMHCHEM/IMCYTCHM 1ST ANTB: CPT | Mod: 26,59

## 2020-08-06 PROCEDURE — 88305 TISSUE EXAM BY PATHOLOGIST: CPT | Mod: 26

## 2020-08-06 PROCEDURE — 88312 SPECIAL STAINS GROUP 1: CPT | Mod: 26

## 2020-08-06 PROCEDURE — 88341 IMHCHEM/IMCYTCHM EA ADD ANTB: CPT | Mod: 26,59

## 2020-08-06 PROCEDURE — 99233 SBSQ HOSP IP/OBS HIGH 50: CPT | Mod: GC

## 2020-08-06 PROCEDURE — 43239 EGD BIOPSY SINGLE/MULTIPLE: CPT | Mod: GC

## 2020-08-06 RX ORDER — APIXABAN 2.5 MG/1
10 TABLET, FILM COATED ORAL EVERY 12 HOURS
Refills: 0 | Status: DISCONTINUED | OUTPATIENT
Start: 2020-08-06 | End: 2020-08-07

## 2020-08-06 RX ORDER — PREGABALIN 225 MG/1
1000 CAPSULE ORAL
Qty: 14000 | Refills: 0
Start: 2020-08-06 | End: 2020-08-19

## 2020-08-06 RX ORDER — SODIUM CHLORIDE 9 MG/ML
1000 INJECTION, SOLUTION INTRAVENOUS
Refills: 0 | Status: DISCONTINUED | OUTPATIENT
Start: 2020-08-06 | End: 2020-08-06

## 2020-08-06 RX ORDER — POTASSIUM CHLORIDE 20 MEQ
40 PACKET (EA) ORAL ONCE
Refills: 0 | Status: COMPLETED | OUTPATIENT
Start: 2020-08-06 | End: 2020-08-06

## 2020-08-06 RX ORDER — APIXABAN 2.5 MG/1
1 TABLET, FILM COATED ORAL
Qty: 60 | Refills: 0
Start: 2020-08-06 | End: 2020-09-04

## 2020-08-06 RX ORDER — POTASSIUM CHLORIDE 20 MEQ
40 PACKET (EA) ORAL ONCE
Refills: 0 | Status: DISCONTINUED | OUTPATIENT
Start: 2020-08-06 | End: 2020-08-06

## 2020-08-06 RX ADMIN — HEPARIN SODIUM 1200 UNIT(S)/HR: 5000 INJECTION INTRAVENOUS; SUBCUTANEOUS at 05:26

## 2020-08-06 RX ADMIN — PREGABALIN 1000 MICROGRAM(S): 225 CAPSULE ORAL at 16:07

## 2020-08-06 RX ADMIN — Medication 125 MICROGRAM(S): at 05:25

## 2020-08-06 RX ADMIN — Medication 40 MILLIEQUIVALENT(S): at 18:53

## 2020-08-06 RX ADMIN — Medication 1 APPLICATION(S): at 05:24

## 2020-08-06 RX ADMIN — APIXABAN 10 MILLIGRAM(S): 2.5 TABLET, FILM COATED ORAL at 21:37

## 2020-08-06 RX ADMIN — SODIUM CHLORIDE 30 MILLILITER(S): 9 INJECTION, SOLUTION INTRAVENOUS at 12:55

## 2020-08-06 RX ADMIN — Medication 20 MILLIGRAM(S): at 05:25

## 2020-08-06 RX ADMIN — CEFTRIAXONE 100 MILLIGRAM(S): 500 INJECTION, POWDER, FOR SOLUTION INTRAMUSCULAR; INTRAVENOUS at 02:00

## 2020-08-06 RX ADMIN — Medication 1 APPLICATION(S): at 18:41

## 2020-08-06 RX ADMIN — Medication 30 MILLIGRAM(S): at 05:25

## 2020-08-06 RX ADMIN — Medication 20 MILLIGRAM(S): at 18:42

## 2020-08-06 RX ADMIN — BUDESONIDE AND FORMOTEROL FUMARATE DIHYDRATE 2 PUFF(S): 160; 4.5 AEROSOL RESPIRATORY (INHALATION) at 21:16

## 2020-08-06 NOTE — PROGRESS NOTE ADULT - PROBLEM SELECTOR PLAN 10
Uncertain home meds.  Emailed med rec pharmacist overnight.  F/u formal med rec.
Uncertain home meds.  Emailed med rec pharmacist overnight.  F/u formal med rec.

## 2020-08-06 NOTE — DISCHARGE NOTE PROVIDER - CARE PROVIDER_API CALL
Marion Matthews  INTERNAL MEDICINE  [address] [place] [address]  Phone: [phone]  Fax: [phone]  Established Patient  Follow Up Time: 1 week

## 2020-08-06 NOTE — DISCHARGE NOTE PROVIDER - NSDCMRMEDTOKEN_GEN_ALL_CORE_FT
Advair Diskus 500 mcg-50 mcg inhalation powder: 1 puff(s) inhaled 2 times a day  aspirin 81 mg oral tablet: 1 tab(s) orally once a day  cyanocobalamin 1000 mcg/mL injectable solution: 1000 microgram(s) intramuscularly once a day.    PLEASE DO NOT FILL MEDICATION- Req PRICE CHECK  Eliquis 5 mg oral tablet: 1 tab(s) orally 2 times a day   levothyroxine 125 mcg (0.125 mg) oral capsule: 1 cap(s) orally once a day  metFORMIN 500 mg oral tablet: 1 tab(s) orally once a day  NIFEdipine 30 mg oral tablet, extended release: 1 tab(s) orally once a day  Proventil HFA 90 mcg/inh inhalation aerosol: 2 puff(s) inhaled 4 times a day, As Needed  simvastatin 20 mg oral tablet: 1 tab(s) orally once a day (at bedtime) Advair Diskus 500 mcg-50 mcg inhalation powder: 1 puff(s) inhaled 2 times a day  aspirin 81 mg oral tablet: 1 tab(s) orally once a day  cyanocobalamin 1000 mcg/mL injectable solution: 1000 microgram(s) intramuscularly once a day.    PLEASE DO NOT FILL MEDICATION- Req PRICE CHECK  Eliquis 5 mg oral tablet: 1 tab(s) orally 2 times a day   levothyroxine 125 mcg (0.125 mg) oral capsule: 1 cap(s) orally once a day  metFORMIN 500 mg oral tablet: 1 tab(s) orally once a day  NIFEdipine 30 mg oral tablet, extended release: 1 tab(s) orally once a day  Outpatient Physical Therapy: dx: B12 neuropathy  ICD-10: G90.09  Proventil HFA 90 mcg/inh inhalation aerosol: 2 puff(s) inhaled 4 times a day, As Needed  simvastatin 20 mg oral tablet: 1 tab(s) orally once a day (at bedtime) 3 mL IM syringe : 1 unit(s) intramuscular once a day   Advair Diskus 500 mcg-50 mcg inhalation powder: 1 puff(s) inhaled 2 times a day  aspirin 81 mg oral tablet: 1 tab(s) orally once a day  clotrimazole 1% topical cream: 1 application topically 2 times a day  cyanocobalamin 1000 mcg/mL injectable solution: 1000 microgram(s) intramuscularly once a day.    Eliquis 5 mg oral tablet: 2 tab(s) orally every 12 hours until 08/12 then take 1 tab orally every 12 hours for 3 months  enalapril 20 mg oral tablet: 1 tab(s) orally 2 times a day  levothyroxine 125 mcg (0.125 mg) oral capsule: 1 cap(s) orally once a day  NIFEdipine 30 mg oral tablet, extended release: 1 tab(s) orally once a day  nystatin 100,000 units/g topical powder: Apply topically to affected area 2 times a day   Outpatient Physical Therapy: dx: B12 neuropathy  ICD-10: G90.09  Proventil HFA 90 mcg/inh inhalation aerosol: 2 puff(s) inhaled 4 times a day, As Needed  simvastatin 20 mg oral tablet: 1 tab(s) orally once a day (at bedtime)

## 2020-08-06 NOTE — PROGRESS NOTE ADULT - ATTENDING COMMENTS
Patient seen and examined, care d/w residents    # Severe B12 def with neuropathy/pernicious anemia: c/w IM B12, PT rec home PT, f/u intrinsic factor ab in lab, s/p EGD today--f/u path; will d/w family and assess for coverage re: home b12 injections vs going to PCP  # DVT: likely 2/2 immobility 2/2 weakness 2/2 B12, heparin gtt-->DOAC (f/u re: coverage)  # Weight loss/Severe protein-mone malnutrition: CT neg for mets or findings demonstrates DVT, appreciate GI and EGD  # UTI:  growing 100 CFU, c/w ctx D2/3  # DM2: well controlled, can discharge off metformin given may worsen b12 absorption  # CAD s/p remote PCI: c/w asa    Dispo planning, needs home PT referral  Possible dc later today dispo time 37 min

## 2020-08-06 NOTE — PROGRESS NOTE ADULT - ASSESSMENT
75 year old woman with a past medical history of HTN, HLD, CAD s/p MI, T2DM, Asthma, reported hx of dVT who presents for b/l LE cramping pain and numbness. 76 yo F with a past medical history of HTN, HLD, CAD s/p MI, T2DM, Asthma, reported hx of dVT who presents for b/l LE cramping pain and numbness dc with B12 def likely due to pernicious anemia, EGD on 8/6 with atrophic gastritis.

## 2020-08-06 NOTE — DISCHARGE NOTE PROVIDER - NSFOLLOWUPCLINICS_GEN_ALL_ED_FT
[facility]  Internal Medicine  [address] [place] [address]  Phone: [phone]  Fax: [phone]  Follow Up Time: 1 week

## 2020-08-06 NOTE — DISCHARGE NOTE PROVIDER - NSDCFUADDINST_GEN_ALL_CORE_FT
Topical Recommendations    Bilateral breasts, ABD pannus and bilateral buttocks: Clean with soap and water. Pat dry. Sprinkle nystatin powder to entire area. Apply twice day.      All intertriginous folds: Clean with soap and water. Pat dry. Place Interdry textile sheeting, under intertriginous folds leaving 2 inches exposed at ends to wick, remove to wash & dry affected area, then replace. Individual sheeting may be used for up to 5 days unless soiled.     Sacral fold and bilateral buttocks: Clean with NS. Pat dry. Sprinkle nystatin powder. Apply nystatin powder Over entire area. Apply TRIAD barrier cream/hydrophillic dressing to entire area over nystatin powder. Apply twice a day.     Bilateral heels: Apply Liquid barrier film daily.     Continue low air loss bed therapy,heel elevation with Z-flex fluidized positioning boots,  turn & reposition q2h with Z-marielos fluidized positioning device, soft pillow between bony prominences, continue moisture management with barrier creams & single breathable pad, continue measures to decrease friction/shear/pressure.

## 2020-08-06 NOTE — PROGRESS NOTE ADULT - PROBLEM SELECTOR PLAN 2
-Uncertain etiology of recent weight loss and poor PO intake - possibly related to B12 deficiency  -Will obtain CT a/p to evaluate for potential GI malignancy given if she does have pernicious anemia this increases risk for gastric cancer  -F/u GI evaluation -Uncertain etiology of recent weight loss and poor PO intake - possibly related to B12 deficiency  - CT a/p negative for GI pathology  -F/u GI evaluation and EGD

## 2020-08-06 NOTE — DISCHARGE NOTE PROVIDER - NSDCFUADDAPPT_GEN_ALL_CORE_FT
- Recommend follow up care at [facility]: [phone]. Address: [address] [place] [address]  - Please follow up with your PMD for continued medical management after discharge

## 2020-08-06 NOTE — PROGRESS NOTE ADULT - PROBLEM SELECTOR PLAN 3
-Anemia likely related to B12 deficiency  -Folate wnl  -Will also check iron panel.  B12 supplementation as above -Anemia likely related to B12 deficiency  -Folate wnl  -Iron panel wnl.  B12 supplementation as above

## 2020-08-06 NOTE — PRE-OP CHECKLIST - STERILIZATION AFFIRMATION
"Encounter Date: 1/22/2020       History     Chief Complaint   Patient presents with    Head Injury     fall on friday, seen in neurology, told to come to er for CT, no loc, on Eliquis, severe bruising to face. eye swelling. left hand bruising      Patient is an 84 yo M presenting from Neurology clinic for imaging after a fall. Patient has AF(ICD and pacemaker), CAD, cardiomyopathy, HTN, HLD, DLB, and extensive Hx of falls. Patient sustained a fell on 1/17/2020. Patient was walking through a door at a [employer] and fell through the door and landed on his face. Patient has a shuffling gate with DLB and refuses to use a walker. He sustained a "bump" to the left forehead with some scratch marks from the rug. Over the next 3 days bruising formed around the left eye to the point he was unable to open his eye. Progressed to the right eye. Also noted swelling and bruising on plantar and dorsal left hand which became swollen. He is denying pain in his head and hand. No CP or SOB. Is having some urinary incontinence and family reports that this is chronic. Vision remains intact. Denying any changes in sensation throughout the upper and lower extremities with intact muscle strength. Patient is with dementia and is a poor historian.         Review of patient's allergies indicates:   Allergen Reactions    Arb-angiotensin receptor antagonist Other (See Comments)     Hyperkalemia    Lisinopril Diarrhea     Past Medical History:   Diagnosis Date    *Atrial fibrillation     Anticoagulant long-term use     Atrial fibrillation - asymptomatic but noted on ICD interrogation ([id number]) - 16 h 40 minutes of afib 2/13/2013    Atrial flutter with controlled response - seen on ICD interrogation - asymptomatic 2/13/2013    Automatic implantable cardioverter-defibrillator in situ 2/13/2013    Basal cell carcinoma     mid forehead    CAD (coronary artery disease) 2/13/2013    Cardiomyopathy     Cataract     Cognitive deficits "     mild    H/O syncope -  5/27/2013    History of PTCA - LAD, LCX and PDA PCI in 2006 2/13/2013    HTN (hypertension) 2/13/2013    Hyperlipidemia LDL goal < 70 2/13/2013    Hypertension     ICD (implantable cardiac defibrillator) in place 2/13/2013    Ischemic cardiomyopathy LVEF ~45% 2/13/2013    LBBB (left bundle branch block) 2/13/2013    Memory loss     Psoriasis vulgaris     Syncope and collapse      Past Surgical History:   Procedure Laterality Date    CARDIAC DEFIBRILLATOR PLACEMENT      CATARACT EXTRACTION W/  INTRAOCULAR LENS IMPLANT Right 04/04/16    Dr Michael     CATARACT EXTRACTION W/  INTRAOCULAR LENS IMPLANT Left 05/09/2016    SKIN BIOPSY      TONSILLECTOMY       Family History   Problem Relation Age of Onset    Psoriasis Father     Dementia Father     Tremor Father     Cataracts Mother     Cancer Mother     Psoriasis Son     Psoriasis Son     Diabetes Maternal Grandmother     Kidney disease Sister     No Known Problems Brother     No Known Problems Maternal Aunt     No Known Problems Maternal Uncle     No Known Problems Paternal Aunt     No Known Problems Paternal Uncle     No Known Problems Maternal Grandfather     No Known Problems Paternal Grandmother     No Known Problems Paternal Grandfather     Amblyopia Neg Hx     Blindness Neg Hx     Glaucoma Neg Hx     Hypertension Neg Hx     Macular degeneration Neg Hx     Retinal detachment Neg Hx     Strabismus Neg Hx     Stroke Neg Hx     Thyroid disease Neg Hx     Parkinsonism Neg Hx     Celiac disease Neg Hx     Cirrhosis Neg Hx     Colon cancer Neg Hx     Colon polyps Neg Hx     Crohn's disease Neg Hx     Cystic fibrosis Neg Hx     Esophageal cancer Neg Hx     Irritable bowel syndrome Neg Hx     Inflammatory bowel disease Neg Hx     Hemochromatosis Neg Hx     Liver cancer Neg Hx     Liver disease Neg Hx     Rectal cancer Neg Hx     Stomach cancer Neg Hx     Ulcerative colitis Neg Hx     Christiano's  disease Neg Hx     Lymphoma Neg Hx     Scleroderma Neg Hx     Rheum arthritis Neg Hx     Multiple sclerosis Neg Hx     Melanoma Neg Hx     Tuberculosis Neg Hx     Lupus Neg Hx      Social History     Tobacco Use    Smoking status: Former Smoker     Last attempt to quit: 1963     Years since quittin.3    Smokeless tobacco: Never Used   Substance Use Topics    Alcohol use: No     Frequency: Monthly or less     Drinks per session: 1 or 2     Binge frequency: Never    Drug use: No     Review of Systems   Constitutional: Negative for activity change, appetite change, chills and fever.   HENT: Negative for congestion and sore throat.    Eyes: Negative for photophobia and visual disturbance.   Respiratory: Negative for apnea, chest tightness and shortness of breath.    Cardiovascular: Negative for chest pain, palpitations and leg swelling.   Gastrointestinal: Negative for abdominal distention, abdominal pain, constipation, diarrhea, nausea and vomiting.   Genitourinary: Positive for difficulty urinating and frequency.   Musculoskeletal: Positive for joint swelling. Negative for arthralgias and back pain.   Neurological: Negative for dizziness, facial asymmetry, numbness and headaches.   Hematological: Negative for adenopathy. Bruises/bleeds easily.   Psychiatric/Behavioral: Positive for confusion. Negative for agitation.       Physical Exam     Initial Vitals [20 1549]   BP Pulse Resp Temp SpO2   (!) 149/68 61 18 97.8 °F (36.6 °C) 97 %      MAP       --         Physical Exam    Nursing note and vitals reviewed.  Constitutional: He appears well-developed and well-nourished. He is not diaphoretic. No distress.   HENT:   Head: Head is with raccoon's eyes, with abrasion and with contusion.       Mouth/Throat: Oropharynx is clear and moist. No oropharyngeal exudate.   Eyes: EOM are normal.   Pinpoint pupils    Neck: Normal range of motion.   Cardiovascular:   Irregular irregular   Pulmonary/Chest:  Breath sounds normal. No respiratory distress. He has no wheezes.   Abdominal: Soft. Bowel sounds are normal. He exhibits no distension.   Musculoskeletal: Normal range of motion. He exhibits no edema or tenderness.   Bruising appreciated on the dorsum and plantar surface of the left hand   Neurological: He is alert.   Oriented to self and place but not date   Skin: Skin is warm and dry.         ED Course   Procedures  Labs Reviewed   URINALYSIS, REFLEX TO URINE CULTURE - Abnormal; Notable for the following components:       Result Value    Appearance, UA Cloudy (*)     Protein, UA 1+ (*)     Occult Blood UA 2+ (*)     Leukocytes, UA 3+ (*)     All other components within normal limits    Narrative:     Preferred Collection Type->Urine, Clean Catch   URINALYSIS MICROSCOPIC - Abnormal; Notable for the following components:    RBC, UA 40 (*)     WBC, UA >100 (*)     WBC Clumps, UA Many (*)     All other components within normal limits    Narrative:     Preferred Collection Type->Urine, Clean Catch   CULTURE, URINE     EKG Readings: (Independently Interpreted)   Initial Reading: No STEMI.   Atrial Paced rhythm with wide QRS. No overt signs of acute ischemia       Imaging Results          CT Head Without Contrast (Final result)  Result time 01/22/20 17:33:31    Final result by Jarvis Sahni MD (01/22/20 17:33:31)                 Impression:      No acute findings intracranially.  Soft tissue hematoma over the left frontal scalp      Electronically signed by: Jarvis Sahni MD  Date:    01/22/2020  Time:    17:33             Narrative:    EXAMINATION:  CT HEAD WITHOUT CONTRAST    CLINICAL HISTORY:  Facial trauma, fx suspected;    TECHNIQUE:  Low dose axial images were obtained through the head.  Coronal and sagittal reformations were also performed. Contrast was not administered.    COMPARISON:  None.    FINDINGS:  CT of the head demonstrates soft tissue swelling over the left frontal scalp.  Bones appear intact.  No  intracranial abnormality is seen.  There are mild involutional changes.  No hemorrhage, infarct, extra-axial collection, mass effect or midline shift is seen.                               CT Maxillofacial Without Contrast (Final result)  Result time 01/22/20 17:42:58    Final result by Jarvis Sahni MD (01/22/20 17:42:58)                 Impression:      Soft tissue swelling over the left frontal scalp.  No evidence of fracture.      Electronically signed by: Jarvis Sahni MD  Date:    01/22/2020  Time:    17:42             Narrative:    EXAMINATION:  CT MAXILLOFACIAL WITHOUT CONTRAST    CLINICAL HISTORY:  Facial fracture(s);    TECHNIQUE:  Low dose axial images, sagittal and coronal reformations were obtained through the face.  Contrast was not administered.    COMPARISON:  CT of the head 01/22/2020 and CT of the facial bones 06/19/2019    FINDINGS:  CT of the facial bones demonstrates no evidence of acute or remote fracture.  There is mild nasal septal deviation to the left inferiorly with a small septal spur.  Paranasal sinuses appear clear.  Orbits appear unremarkable.  Soft tissue swelling over the left scalp is again seen.  No other significant findings                                 Medical Decision Making:   History:   Old Medical Records: I decided to obtain old medical records.  Old Records Summarized: records from clinic visits.       <> Summary of Records: Patient sent from [facility] for evaluation of facial fracture and head bleed  Initial Assessment:   Patient is a 82 yo M with DLB dementia presenting after a fall 5 days prior to evaluation and is here for CT to evaluate for brain bleed and facial fractures. He is neurologically at baseline and no FNDs. Left hand trauma with bruising noted as well.   Differential Diagnosis:   SDH  Facial Fx  SAH  Epidural Hematoma   Orbital Fx  Cranial Hematoma   UTI  Clinical Tests:   Lab Tests: Ordered  Medical Tests: Ordered  ED Management:  Patient  emergently evaluated in ED. Not demonstrating and FNDs and appears to be neurologically at baseline. Physical exam concerning for possible orbital/facial fracture. Will evaluate patient with CT head non con and CT maxface non con. Left had with contusion but has intact radial pulse. Will evaluate with XR of the left hand and wrist.     6:10 PM  CT head without signs of acute intracranial changes and this along with no FND and no changes in sensation and intact extremity strength am not concerned for acute brain bleed. There is a hematoma of the left forehead appreciated. CT max face without acute fracture of the orbits or facial bones.     7:05 PM  Patient with increased urinary frequency. UA is hazy with leukocytes with >100 WBC. Previously + UCx with coag neg staph. Will treat with Cephalexin one time in the ED and prescribe 7 day course BID. XR of hand and wrist without signs of acute fracture. Informed patient and family that he will need to follow up with his PCP within the week to ensure that the UTI is clearing and for neurologic evaluation to ensure he is still stable despite negative imaging.               Attending Attestation:   Physician Attestation Statement for Resident:  As the supervising MD   Physician Attestation Statement: I have personally seen and examined this patient.   I agree with the above history. -:   As the supervising MD I agree with the above PE.    As the supervising MD I agree with the above treatment, course, plan, and disposition.                                  Clinical Impression:       ICD-10-CM ICD-9-CM   1. Contusion of left eyelid, initial encounter S00.12XA 921.1   2. Fall W19.XXXA E888.9   3. Contusion of left hand, initial encounter S60.222A 923.20   4. Traumatic hematoma of forehead, initial encounter S00.83XA 920   5. Urinary tract infection without hematuria, site unspecified N39.0 599.0         Disposition:   Disposition: Discharged  Condition:  Stable                     Mor Collazo MD  Resident  01/22/20 2033       Ivan Calero MD  01/22/20 2573     n/a

## 2020-08-06 NOTE — PROGRESS NOTE ADULT - PROBLEM SELECTOR PLAN 9
Improve score 2 for poor mobility and age.  Lovenox ppx. Improve score 2 for poor mobility and age.  - Heparin gtt (DVT on CT a/p)

## 2020-08-06 NOTE — DISCHARGE NOTE PROVIDER - HOSPITAL COURSE
75 year old female with PMHx of HTN, HLD, CAD s/p MI, T2DM, Asthma, and reported hx of DVT presented with bilateral LE cramping pain, numbness, and weakness. Patient first noticed symptoms about 3 weeks prior which progressively worsened and made it difficult for her to walk. Patient also had some lightheadedness, dizziness, and dysuria but denied chest pain, SOB, incontinence. Patient endorsed a 22 lb weight loss in the last month, which she attributes to decreased appetite and PO intake. Patient was noted to have an extensive rash under her breasts that was being treated with clotrimazole. In the ED, patient was afebrile and vitals were unremarkable. Physical exam significant for mildly reduced sensation and 4+ strength in LE b/l, along with patchy areas of vitiligo on dorsal hands and feet. Patient found to have a macrocytic anemia and vitamin B12 deficiency. Folate levels and iron panel wnl. Fingerstick glucose wnl. Patient was started on B12 injections. GI was consulted and EGD was performed which revealed atrophic gastric mucosa suggestive of pernicious anemia. CT a/p revealed incidental finding of thrombus within the left common iliac vein, external iliac vein, femoral vein, and right femoral vein. Patient was started on heparin drip. 75 year old female with PMHx of HTN, HLD, CAD s/p MI, T2DM, Asthma, and reported hx of DVT presented with bilateral LE cramping pain, numbness, and weakness for about 3 weeks which made it difficult for her to walk. Patient also experienced a 22 lb weight loss in the last month, which she attributes to decreased appetite and PO intake. Patient was noted to have an extensive rash under her breasts that was being treated with clotrimazole. In the ED, patient was afebrile and vitals were unremarkable. Physical exam significant for mildly reduced sensation and 4+ strength in LE b/l, along with patchy areas of vitiligo on dorsal hands and feet. Patient found to have a macrocytic anemia and vitamin B12 deficiency. Folate levels and iron panel wnl. Fingerstick glucose wnl. Patient was started on B12 injections. GI was consulted and EGD was performed which revealed atrophic gastric mucosa suggestive of pernicious anemia. CT a/p revealed incidental finding of thrombus within the left common iliac vein, external iliac vein, femoral vein, and right femoral vein. Patient was started on heparin drip. 74 yo F w/ PMH of HTN, HLD, CAD s/p MI, T2DM, Asthma, reported hx of DVT, vitiligo, and hypothyroidism p/w bilateral LE cramping pain, numbness, and weakness for about 3 weeks w/ associated 22lb weight loss in the last month, which she attributes to decreased appetite and PO intake noted to have macrocytic anemia, B12 def. (150) likely in the setting of pernicious anemia. Patient was started on B12 injections and underwent an EGD w/ GI which revealed atrophic gastric mucosa suggestive of pernicious anemia. CTAP revealed incidental finding of thrombus within the left common iliac vein, external iliac vein, femoral vein, and right femoral vein. Patient was started on heparin drip and subsequently transitioned to Eliquis for DVT treatment.        Pt is now medically optimized for discharge home with outpatient follow up. She is to continue w/ B12 injections daily for two weeks followed by once weekly for 3 months. She is to also follow up with GI and her PMD for further workup as weight loss and DVT concerning for underlying malignancy. 74 yo F w/ PMH of HTN, HLD, CAD s/p MI, T2DM, Asthma, reported hx of DVT, vitiligo, and hypothyroidism p/w bilateral LE cramping pain, numbness, and weakness for about 3 weeks w/ associated 22lb weight loss in the last month, which she attributes to decreased appetite and PO intake noted to have macrocytic anemia, B12 def. (150) with ddx including pernicious anemia, malnutrition, malabsorption, or Metformin use (though less likely as pt has only been taking for 1 yr). Patient was started on B12 injections and underwent an EGD w/ GI which revealed atrophic gastric mucosa suggestive of pernicious anemia. CTAP revealed incidental finding of thrombus within the left common iliac vein, external iliac vein, femoral vein, and right femoral vein. Patient was started on heparin drip and subsequently transitioned to Eliquis for DVT treatment.        Pt is now medically optimized for discharge home with outpatient follow up. She is to continue w/ B12 injections daily for two weeks followed by once weekly for 3 months. She is to also follow up with GI and her PMD for further workup for B12 def. as well as weight loss and DVT concerning for underlying malignancy.

## 2020-08-06 NOTE — PROGRESS NOTE ADULT - PROBLEM SELECTOR PLAN 1
-Suspect b/l lower extremity numbness and tingling is related to B12 deficiency  -Uncertain etiology of B12 deficiency - possibly intrinsic factor deficiency.  Denies a voluntary dietary restriction that might cause deficiency.  Does have vitiligo which may suggest autoimmune dysfunction.  Metformin also interferes with B12  -Given neurologic symptoms, will supplement IM to start with 1000 micrograms now and then approximately every 2 days.    -Will also check copper, RPR, homocysteine, IF antibodies    Endorses dysuria - UA concerning for UTI.  Ceftriaxone pending urine cultures. -Suspect b/l lower extremity numbness and tingling is related to B12 deficiency  -Uncertain etiology of B12 deficiency - possibly intrinsic factor deficiency.  Denies a voluntary dietary restriction that might cause deficiency.  Does have vitiligo which may suggest autoimmune dysfunction.  Metformin also interferes with B12  -Given neurologic symptoms, will supplement IM to start with 1000 micrograms now and then approximately every 2 days.        Endorses dysuria - UA concerning for UTI.  Ceftriaxone (day 2) pending urine cultures.

## 2020-08-06 NOTE — DISCHARGE NOTE PROVIDER - NSDCCPCAREPLAN_GEN_ALL_CORE_FT
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 This is an autoimmune (your body attacking its own self) disorder that is found in patients who have other such disorders such as your hypothyroidism and Vitiligo (changes in skin color). You underwent an EGD (a scope down the feeding pipe and into the stomach) which showed smooth and flat stomach which correlates with this disease. You were treated with vitamin B12 injections. You are to continue with these injections at home daily for two weeks followed by once a week for 3 months. You are to follow up with the gastroenterologists and your primary care provider for continued medical management.      SECONDARY DISCHARGE DIAGNOSES  Diagnosis: Severe protein-calorie malnutrition  Assessment and Plan of Treatment: Because you complained of unintentional weight loss of 22 pounds in one month and were noted to have a DVT (Blood clot in the legs), it is recommended that you follow up with your primary care provider and undergo all your cancer screenings for further assessment. You are to also follow up with the gastroenterologists for continued care and follow up endoscopy.    Diagnosis: Type 2 diabetes mellitus without complication, without long-term current use of insulin  Assessment and Plan of Treatment: Your sugar levels were normal and your A1c (measure of your blood sugar levels in the past three monts) was 5.4% which is excellent. Please continue to monitor your diet and avoid fatty, sugary foods.    Diagnosis: Essential hypertension  Assessment and Plan of Treatment: Please continue to take your medications as prescribed.    Diagnosis: Acquired hypothyroidism  Assessment and Plan of Treatment: Please continue to take your Synthroid as prescribed. PRINCIPAL DISCHARGE DIAGNOSIS  Diagnosis: Vitamin B12 deficiency  Assessment and Plan of Treatment: You were noted to have low vitmin B12 levels as well as low blood counts with pattern called "Macrocytic Anemia" with underlying cause still pending workup. Some of the reasons this vitamin may be low are due to not absorbing it properly due to possible infection in the stomach vs not eating a proper diet or Pernious Anemia causing this weakness and numbness in your legs called "B12 neuropathy". You underwent an EGD (a scope down the feeding pipe and into the stomach) which showed smooth and flat stomach which correlates with Pernicious anemia, however, lab work is pending regarding this diagnosis. You were treated with vitamin B12 injections. You are to continue with these injections at home daily for two weeks followed by once a week for 3 months. You are to follow up with the gastroenterologists and your primary care provider for continued medical management.      SECONDARY DISCHARGE DIAGNOSES  Diagnosis: Severe protein-calorie malnutrition  Assessment and Plan of Treatment: Because you complained of unintentional weight loss of 22 pounds in one month and were noted to have a DVT (Blood clot in the legs), it is recommended that you follow up with your primary care provider and undergo all your cancer screenings for further assessment. You are to also follow up with the gastroenterologists for continued care and follow up endoscopy.    Diagnosis: Type 2 diabetes mellitus without complication, without long-term current use of insulin  Assessment and Plan of Treatment: Your sugar levels were normal and your A1c (measure of your blood sugar levels in the past three monts) was 5.4% which is excellent. Please continue to monitor your diet and avoid fatty, sugary foods.    Diagnosis: Essential hypertension  Assessment and Plan of Treatment: Please continue to take your medications as prescribed.    Diagnosis: Acquired hypothyroidism  Assessment and Plan of Treatment: Please continue to take your Synthroid as prescribed.

## 2020-08-06 NOTE — PROGRESS NOTE ADULT - PROBLEM SELECTOR PLAN 4
Hold home metformin.  SSI, acck, DM diet Hold home metformin.  - FS have been wnl  - will stop insulin and FS checks

## 2020-08-06 NOTE — PROGRESS NOTE ADULT - PROBLEM SELECTOR PLAN 5
Uncertain home meds.  Will continue enalapril and nifedipine she was previously on pending formal med rec. - c/w enalapril  - c/w nifedipine

## 2020-08-06 NOTE — PROGRESS NOTE ADULT - SUBJECTIVE AND OBJECTIVE BOX
PROGRESS NOTE:   Authored by Wai Zayas MD  PGY-1, Internal Medicine  Pager [facility] [phone], [phone]     Patient is a 75y old  Female who presents with a chief complaint of CC: Lower extremity cramping/numbness (05 Aug 2020 14:38)      SUBJECTIVE / OVERNIGHT EVENTS: No events overnight. Patient made NPO at midnight and heparin held at 6am in preparation for EGD today. Patient complained about her rash and some "tightness" on her abdomen on right side. Pain is dull and she attributes it to her lying on her side. She also expressed some concern about her EGD procedure today. Reassured the patient about the procedure, that she would be asleep during it, and that it will not trigger her asthma.     ADDITIONAL REVIEW OF SYSTEMS: Denies fevers, chills, n/v.    MEDICATIONS  (STANDING):  budesonide 160 MICROgram(s)/formoterol 4.5 MICROgram(s) Inhaler 2 Puff(s) Inhalation two times a day  cefTRIAXone   IVPB 1000 milliGRAM(s) IV Intermittent every 24 hours  clotrimazole 1% Cream 1 Application(s) Topical two times a day  cyanocobalamin Injectable 1000 MICROGram(s) IntraMuscular daily  dextrose 5%. 1000 milliLiter(s) (50 mL/Hr) IV Continuous <Continuous>  dextrose 50% Injectable 12.5 Gram(s) IV Push once  dextrose 50% Injectable 25 Gram(s) IV Push once  dextrose 50% Injectable 25 Gram(s) IV Push once  enalapril 20 milliGRAM(s) Oral two times a day  insulin lispro (HumaLOG) corrective regimen sliding scale   SubCutaneous three times a day before meals  insulin lispro (HumaLOG) corrective regimen sliding scale   SubCutaneous at bedtime  levothyroxine 125 MICROGram(s) Oral daily  NIFEdipine XL 30 milliGRAM(s) Oral daily    MEDICATIONS  (PRN):  acetaminophen   Tablet .. 650 milliGRAM(s) Oral every 6 hours PRN Mild Pain (1 - 3), Moderate Pain (4 - 6), Severe Pain (7 - 10)  ALBUTerol    90 MICROgram(s) HFA Inhaler 2 Puff(s) Inhalation every 6 hours PRN Shortness of Breath and/or Wheezing  dextrose 40% Gel 15 Gram(s) Oral once PRN Blood Glucose LESS THAN 70 milliGRAM(s)/deciliter  glucagon  Injectable 1 milliGRAM(s) IntraMuscular once PRN Glucose LESS THAN 70 milligrams/deciliter  LORazepam     Tablet 0.5 milliGRAM(s) Oral every 8 hours PRN Anxiety  ondansetron Injectable 4 milliGRAM(s) IV Push every 6 hours PRN Nausea      CAPILLARY BLOOD GLUCOSE      POCT Blood Glucose.: 91 mg/dL (06 Aug 2020 08:33)  POCT Blood Glucose.: 103 mg/dL (05 Aug 2020 21:43)  POCT Blood Glucose.: 118 mg/dL (05 Aug 2020 17:34)  POCT Blood Glucose.: 84 mg/dL (05 Aug 2020 12:23)    I&O's Summary      PHYSICAL EXAM:  Vital Signs Last 24 Hrs  T(C): 36.9 (06 Aug 2020 09:30), Max: 37.1 (05 Aug 2020 11:20)  T(F): 98.4 (06 Aug 2020 09:30), Max: 98.8 (05 Aug 2020 11:20)  HR: 76 (06 Aug 2020 09:30) (70 - 97)  BP: 125/63 (06 Aug 2020 09:30) (118/64 - 158/68)  BP(mean): --  RR: 14 (06 Aug 2020 09:30) (14 - 18)  SpO2: 97% (06 Aug 2020 09:30) (97% - 100%)    CONSTITUTIONAL: NAD, lying in bed comfortably  RESPIRATORY: Normal respiratory effort; CTABL  CARDIOVASCULAR: Regular rate and rhythm, normal S1 and S2, no murmur/rub/gallop  ABDOMEN: Soft, nondistended, nontender to palpation, normoactive bowel sounds, no rebound/guarding  MUSCLOSKELETAL: no joint swelling or tenderness to palpation, FROM all extremities  NEURO: AAOx3 to person, place, and time, full and equal strength all extremities   EXTREMITIES: no pedal edema    LABS:                        8.4    6.54  )-----------( 198      ( 06 Aug 2020 04:20 )             25.3     08-06    137  |  98  |  21  ----------------------------<  88  3.8   |  24  |  1.00    Ca    10.6<H>      06 Aug 2020 04:20  Phos  2.6     08-06  Mg     2.0     08-06    TPro  7.8  /  Alb  3.8  /  TBili  1.0  /  DBili  x   /  AST  14  /  ALT  8   /  AlkPhos  95  08-06    PT/INR - ( 06 Aug 2020 04:20 )   PT: 13.7 SEC;   INR: 1.21          PTT - ( 06 Aug 2020 04:20 )  PTT:81.5 SEC      Urinalysis Basic - ( 04 Aug 2020 23:56 )    Color: YELLOW / Appearance: Lt TURBID / S.023 / pH: 7.0  Gluc: NEGATIVE / Ketone: SMALL  / Bili: NEGATIVE / Urobili: NORMAL   Blood: NEGATIVE / Protein: SMALL / Nitrite: POSITIVE   Leuk Esterase: LARGE / RBC: 3-5 / WBC 26-50   Sq Epi: x / Non Sq Epi: OCC / Bacteria: LARGE        Culture - Urine (collected 05 Aug 2020 06:09)  Source: .Urine Clean Catch (Midstream)  Preliminary Report (06 Aug 2020 07:58):    >100,000 CFU/ml Gram Negative Rods        RADIOLOGY & ADDITIONAL TESTS: PROGRESS NOTE:   Authored by Wai Zayas MD  PGY-1, Internal Medicine  Pager [facility] [phone], [phone]     Patient is a 75y old  Female who presents with a chief complaint of CC: Lower extremity cramping/numbness (05 Aug 2020 14:38)    SUBJECTIVE / OVERNIGHT EVENTS: No events overnight. Patient made NPO at midnight and heparin held at 6am in preparation for EGD today. Patient complained about her rash and some "tightness" on her abdomen on right side. Pain is dull and she attributes it to her lying on her side. She also expressed some concern about her EGD procedure today. Reassured the patient about the procedure, that she would be asleep during it, and that it will not trigger her asthma.     ADDITIONAL REVIEW OF SYSTEMS: Denies fevers, chills, n/v.    MEDICATIONS  (STANDING):  budesonide 160 MICROgram(s)/formoterol 4.5 MICROgram(s) Inhaler 2 Puff(s) Inhalation two times a day  cefTRIAXone   IVPB 1000 milliGRAM(s) IV Intermittent every 24 hours  clotrimazole 1% Cream 1 Application(s) Topical two times a day  cyanocobalamin Injectable 1000 MICROGram(s) IntraMuscular daily  enalapril 20 milliGRAM(s) Oral two times a day  insulin lispro (HumaLOG) corrective regimen sliding scale   SubCutaneous three times a day before meals  insulin lispro (HumaLOG) corrective regimen sliding scale   SubCutaneous at bedtime  levothyroxine 125 MICROGram(s) Oral daily  NIFEdipine XL 30 milliGRAM(s) Oral daily    MEDICATIONS  (PRN):  acetaminophen   Tablet .. 650 milliGRAM(s) Oral every 6 hours PRN Mild Pain (1 - 3), Moderate Pain (4 - 6), Severe Pain (7 - 10)  ALBUTerol    90 MICROgram(s) HFA Inhaler 2 Puff(s) Inhalation every 6 hours PRN Shortness of Breath and/or Wheezing  dextrose 40% Gel 15 Gram(s) Oral once PRN Blood Glucose LESS THAN 70 milliGRAM(s)/deciliter  glucagon  Injectable 1 milliGRAM(s) IntraMuscular once PRN Glucose LESS THAN 70 milligrams/deciliter  LORazepam     Tablet 0.5 milliGRAM(s) Oral every 8 hours PRN Anxiety  ondansetron Injectable 4 milliGRAM(s) IV Push every 6 hours PRN Nausea    CAPILLARY BLOOD GLUCOSE  POCT Blood Glucose.: 91 mg/dL (06 Aug 2020 08:33)  POCT Blood Glucose.: 103 mg/dL (05 Aug 2020 21:43)  POCT Blood Glucose.: 118 mg/dL (05 Aug 2020 17:34)  POCT Blood Glucose.: 84 mg/dL (05 Aug 2020 12:23)    PHYSICAL EXAM:  Vital Signs Last 24 Hrs  T(C): 36.9 (06 Aug 2020 09:30), Max: 37.1 (05 Aug 2020 11:20)  T(F): 98.4 (06 Aug 2020 09:30), Max: 98.8 (05 Aug 2020 11:20)  HR: 76 (06 Aug 2020 09:30) (70 - 97)  BP: 125/63 (06 Aug 2020 09:30) (118/64 - 158/68)  RR: 14 (06 Aug 2020 09:30) (14 - 18)  SpO2: 97% (06 Aug 2020 09:30) (97% - 100%)    CONSTITUTIONAL: NAD, lying in bed comfortably  RESPIRATORY: Normal respiratory effort; CTABL  CARDIOVASCULAR: Regular rate and rhythm, normal S1 and S2, no murmur/rub/gallop  ABDOMEN: Soft, nondistended, nontender to palpation, normoactive bowel sounds, no rebound/guarding  MUSCLOSKELETAL: no joint swelling or tenderness to palpation, FROM all extremities  NEURO: AAOx3 to person, place, and time, full and equal strength all extremities   EXTREMITIES: no pedal edema    LABS:                        8.4    6.54  )-----------( 198      ( 06 Aug 2020 04:20 )             25.3     08-06    137  |  98  |  21  ----------------------------<  88  3.8   |  24  |  1.00    Ca    10.6<H>      06 Aug 2020 04:20  Phos  2.6     08-06  Mg     2.0     08-06    TPro  7.8  /  Alb  3.8  /  TBili  1.0  /  DBili  x   /  AST  14  /  ALT  8   /  AlkPhos  95  08-06    PT/INR - ( 06 Aug 2020 04:20 )   PT: 13.7 SEC;   INR: 1.21     PTT - ( 06 Aug 2020 04:20 )  PTT:81.5 SEC    Culture - Urine (collected 05 Aug 2020 06:09)  Source: .Urine Clean Catch (Midstream)  Preliminary Report (06 Aug 2020 07:58):    >100,000 CFU/ml Gram Negative Rods      RADIOLOGY & ADDITIONAL TESTS: PROGRESS NOTE:   Authored by Wai Zayas MD  PGY-1, Internal Medicine  Pager [facility] [phone], [phone]     Patient is a 75y old  Female who presents with a chief complaint of CC: Lower extremity cramping/numbness (05 Aug 2020 14:38)    SUBJECTIVE / OVERNIGHT EVENTS: No events overnight. Patient made NPO at midnight and heparin held at 6am in preparation for EGD today. Patient complained about her rash and some "tightness" on her abdomen on right side. Pain is dull and she attributes it to her lying on her side. She also expressed some concern about her EGD procedure today. Reassured the patient about the procedure, that she would be asleep during it, and that it will not trigger her asthma.     ADDITIONAL REVIEW OF SYSTEMS: Denies fevers, chills, n/v.    MEDICATIONS  (STANDING):  budesonide 160 MICROgram(s)/formoterol 4.5 MICROgram(s) Inhaler 2 Puff(s) Inhalation two times a day  cefTRIAXone   IVPB 1000 milliGRAM(s) IV Intermittent every 24 hours  clotrimazole 1% Cream 1 Application(s) Topical two times a day  cyanocobalamin Injectable 1000 MICROGram(s) IntraMuscular daily  enalapril 20 milliGRAM(s) Oral two times a day  insulin lispro (HumaLOG) corrective regimen sliding scale   SubCutaneous three times a day before meals  insulin lispro (HumaLOG) corrective regimen sliding scale   SubCutaneous at bedtime  levothyroxine 125 MICROGram(s) Oral daily  NIFEdipine XL 30 milliGRAM(s) Oral daily    MEDICATIONS  (PRN):  acetaminophen   Tablet .. 650 milliGRAM(s) Oral every 6 hours PRN Mild Pain (1 - 3), Moderate Pain (4 - 6), Severe Pain (7 - 10)  ALBUTerol    90 MICROgram(s) HFA Inhaler 2 Puff(s) Inhalation every 6 hours PRN Shortness of Breath and/or Wheezing  dextrose 40% Gel 15 Gram(s) Oral once PRN Blood Glucose LESS THAN 70 milliGRAM(s)/deciliter  glucagon  Injectable 1 milliGRAM(s) IntraMuscular once PRN Glucose LESS THAN 70 milligrams/deciliter  LORazepam     Tablet 0.5 milliGRAM(s) Oral every 8 hours PRN Anxiety  ondansetron Injectable 4 milliGRAM(s) IV Push every 6 hours PRN Nausea    CAPILLARY BLOOD GLUCOSE  POCT Blood Glucose.: 91 mg/dL (06 Aug 2020 08:33)  POCT Blood Glucose.: 103 mg/dL (05 Aug 2020 21:43)  POCT Blood Glucose.: 118 mg/dL (05 Aug 2020 17:34)  POCT Blood Glucose.: 84 mg/dL (05 Aug 2020 12:23)    PHYSICAL EXAM:  Vital Signs Last 24 Hrs  T(C): 36.9 (06 Aug 2020 09:30), Max: 37.1 (05 Aug 2020 11:20)  T(F): 98.4 (06 Aug 2020 09:30), Max: 98.8 (05 Aug 2020 11:20)  HR: 76 (06 Aug 2020 09:30) (70 - 97)  BP: 125/63 (06 Aug 2020 09:30) (118/64 - 158/68)  RR: 14 (06 Aug 2020 09:30) (14 - 18)  SpO2: 97% (06 Aug 2020 09:30) (97% - 100%)    CONSTITUTIONAL: NAD, lying in bed comfortably  RESPIRATORY: Normal respiratory effort; CTABL  CARDIOVASCULAR: Regular rate and rhythm, normal S1 and S2, no murmur/rub/gallop  ABDOMEN: Soft, nondistended, nontender to palpation, normoactive bowel sounds, no rebound/guarding. Rash under breasts.  MUSCLOSKELETAL: no joint swelling or tenderness to palpation, FROM all extremities. 4+ strength in legs b/l  NEURO: AAOx3 to person, place, and time, full and equal strength all extremities   EXTREMITIES: no pedal edema.     LABS:                        8.4    6.54  )-----------( 198      ( 06 Aug 2020 04:20 )             25.3     08-06    137  |  98  |  21  ----------------------------<  88  3.8   |  24  |  1.00    Ca    10.6<H>      06 Aug 2020 04:20  Phos  2.6     08-06  Mg     2.0     08-06    TPro  7.8  /  Alb  3.8  /  TBili  1.0  /  DBili  x   /  AST  14  /  ALT  8   /  AlkPhos  95  08-06    PT/INR - ( 06 Aug 2020 04:20 )   PT: 13.7 SEC;   INR: 1.21     PTT - ( 06 Aug 2020 04:20 )  PTT:81.5 SEC    Culture - Urine (collected 05 Aug 2020 06:09)  Source: .Urine Clean Catch (Midstream)  Preliminary Report (06 Aug 2020 07:58):    >100,000 CFU/ml Gram Negative Rods      RADIOLOGY & ADDITIONAL TESTS:

## 2020-08-07 VITALS
DIASTOLIC BLOOD PRESSURE: 63 MMHG | OXYGEN SATURATION: 98 % | TEMPERATURE: 98 F | SYSTOLIC BLOOD PRESSURE: 105 MMHG | HEART RATE: 94 BPM | RESPIRATION RATE: 17 BRPM

## 2020-08-07 LAB
-  AMIKACIN: SIGNIFICANT CHANGE UP
-  AMOXICILLIN/CLAVULANIC ACID: SIGNIFICANT CHANGE UP
-  AMPICILLIN/SULBACTAM: SIGNIFICANT CHANGE UP
-  AMPICILLIN: SIGNIFICANT CHANGE UP
-  AZTREONAM: SIGNIFICANT CHANGE UP
-  CEFAZOLIN: SIGNIFICANT CHANGE UP
-  CEFEPIME: SIGNIFICANT CHANGE UP
-  CEFOXITIN: SIGNIFICANT CHANGE UP
-  CEFTRIAXONE: SIGNIFICANT CHANGE UP
-  CIPROFLOXACIN: SIGNIFICANT CHANGE UP
-  ERTAPENEM: SIGNIFICANT CHANGE UP
-  GENTAMICIN: SIGNIFICANT CHANGE UP
-  IMIPENEM: SIGNIFICANT CHANGE UP
-  LEVOFLOXACIN: SIGNIFICANT CHANGE UP
-  MEROPENEM: SIGNIFICANT CHANGE UP
-  NITROFURANTOIN: SIGNIFICANT CHANGE UP
-  PIPERACILLIN/TAZOBACTAM: SIGNIFICANT CHANGE UP
-  TIGECYCLINE: SIGNIFICANT CHANGE UP
-  TOBRAMYCIN: SIGNIFICANT CHANGE UP
-  TRIMETHOPRIM/SULFAMETHOXAZOLE: SIGNIFICANT CHANGE UP
ANION GAP SERPL CALC-SCNC: 16 MMO/L — HIGH (ref 7–14)
BUN SERPL-MCNC: 18 MG/DL — SIGNIFICANT CHANGE UP (ref 7–23)
CALCIUM SERPL-MCNC: 10.5 MG/DL — SIGNIFICANT CHANGE UP (ref 8.4–10.5)
CHLORIDE SERPL-SCNC: 99 MMOL/L — SIGNIFICANT CHANGE UP (ref 98–107)
CO2 SERPL-SCNC: 24 MMOL/L — SIGNIFICANT CHANGE UP (ref 22–31)
CREAT SERPL-MCNC: 1.08 MG/DL — SIGNIFICANT CHANGE UP (ref 0.5–1.3)
GLUCOSE BLDC GLUCOMTR-MCNC: 112 MG/DL — HIGH (ref 70–99)
GLUCOSE BLDC GLUCOMTR-MCNC: 97 MG/DL — SIGNIFICANT CHANGE UP (ref 70–99)
GLUCOSE SERPL-MCNC: 86 MG/DL — SIGNIFICANT CHANGE UP (ref 70–99)
HCT VFR BLD CALC: 23.9 % — LOW (ref 34.5–45)
HGB BLD-MCNC: 8.3 G/DL — LOW (ref 11.5–15.5)
MAGNESIUM SERPL-MCNC: 2.1 MG/DL — SIGNIFICANT CHANGE UP (ref 1.6–2.6)
MCHC RBC-ENTMCNC: 34.7 % — SIGNIFICANT CHANGE UP (ref 32–36)
MCHC RBC-ENTMCNC: 43.9 PG — HIGH (ref 27–34)
MCV RBC AUTO: 126.5 FL — HIGH (ref 80–100)
METHOD TYPE: SIGNIFICANT CHANGE UP
NRBC # FLD: 0.03 K/UL — SIGNIFICANT CHANGE UP (ref 0–0)
PHOSPHATE SERPL-MCNC: 2.5 MG/DL — SIGNIFICANT CHANGE UP (ref 2.5–4.5)
PLATELET # BLD AUTO: 189 K/UL — SIGNIFICANT CHANGE UP (ref 150–400)
PMV BLD: 12 FL — SIGNIFICANT CHANGE UP (ref 7–13)
POTASSIUM SERPL-MCNC: 3.9 MMOL/L — SIGNIFICANT CHANGE UP (ref 3.5–5.3)
POTASSIUM SERPL-SCNC: 3.9 MMOL/L — SIGNIFICANT CHANGE UP (ref 3.5–5.3)
RBC # BLD: 1.89 M/UL — LOW (ref 3.8–5.2)
RBC # FLD: 14.1 % — SIGNIFICANT CHANGE UP (ref 10.3–14.5)
SODIUM SERPL-SCNC: 139 MMOL/L — SIGNIFICANT CHANGE UP (ref 135–145)
WBC # BLD: 5.75 K/UL — SIGNIFICANT CHANGE UP (ref 3.8–10.5)
WBC # FLD AUTO: 5.75 K/UL — SIGNIFICANT CHANGE UP (ref 3.8–10.5)

## 2020-08-07 PROCEDURE — 99239 HOSP IP/OBS DSCHRG MGMT >30: CPT | Mod: GC

## 2020-08-07 PROCEDURE — 99232 SBSQ HOSP IP/OBS MODERATE 35: CPT | Mod: GC

## 2020-08-07 RX ORDER — PREGABALIN 225 MG/1
1000 CAPSULE ORAL
Qty: 14000 | Refills: 0
Start: 2020-08-07 | End: 2020-08-20

## 2020-08-07 RX ORDER — NYSTATIN CREAM 100000 [USP'U]/G
1 CREAM TOPICAL
Qty: 1 | Refills: 0
Start: 2020-08-07 | End: 2020-09-05

## 2020-08-07 RX ORDER — APIXABAN 2.5 MG/1
2 TABLET, FILM COATED ORAL
Qty: 120 | Refills: 2
Start: 2020-08-07 | End: 2020-11-04

## 2020-08-07 RX ADMIN — BUDESONIDE AND FORMOTEROL FUMARATE DIHYDRATE 2 PUFF(S): 160; 4.5 AEROSOL RESPIRATORY (INHALATION) at 09:06

## 2020-08-07 RX ADMIN — PREGABALIN 1000 MICROGRAM(S): 225 CAPSULE ORAL at 11:21

## 2020-08-07 RX ADMIN — APIXABAN 10 MILLIGRAM(S): 2.5 TABLET, FILM COATED ORAL at 10:03

## 2020-08-07 RX ADMIN — Medication 125 MICROGRAM(S): at 06:06

## 2020-08-07 RX ADMIN — Medication 1 APPLICATION(S): at 06:07

## 2020-08-07 RX ADMIN — Medication 30 MILLIGRAM(S): at 06:07

## 2020-08-07 RX ADMIN — CEFTRIAXONE 100 MILLIGRAM(S): 500 INJECTION, POWDER, FOR SOLUTION INTRAMUSCULAR; INTRAVENOUS at 02:07

## 2020-08-07 RX ADMIN — Medication 20 MILLIGRAM(S): at 06:07

## 2020-08-07 NOTE — PROGRESS NOTE ADULT - PROBLEM SELECTOR PLAN 2
-Uncertain etiology of recent weight loss and poor PO intake - possibly related to B12 deficiency  - CT a/p negative for GI pathology  -F/u GI evaluation and EGD -Uncertain etiology of recent weight loss and poor PO intake - possibly related to B12 deficiency  - CT a/p negative for GI pathology  -GI following, F/u biopsies

## 2020-08-07 NOTE — PROGRESS NOTE ADULT - ATTENDING COMMENTS
Patient seen and examined, care d/w residents    # Severe B12 def with neuropathy/pernicious anemia: c/w IM B12, PT rec home PT, intrinsic ab indeterminate, s/p EGD 8/6--OP f/u for path with GI; daughter can/willing to admin B12 at home  # DVT: likely 2/2 immobility 2/2 weakness 2/2 B12, heparin gtt-->DOAC   # Weight loss/Severe protein-mone malnutrition: CT neg for mets or findings demonstrates DVT, appreciate GI and EGD  # UTI:  growing 100 CFU E coli,  completed ctx D3/3  # DM2: well controlled, can discharge off metformin given may worsen b12 absorption  # CAD s/p remote PCI: c/w asa    Dispo planning, needs home PT referral (pt prefers OP PT however then wont qualify for home care, to d/w family re; preference)  Dc home today, dispo time 37 min

## 2020-08-07 NOTE — PROGRESS NOTE ADULT - PROBLEM SELECTOR PLAN 3
-Anemia likely related to B12 deficiency  -Folate wnl  -Iron panel wnl.  B12 supplementation as above -EGD showing atrophic gastric mucosa, suggestive of pernicious anemia. Homocysteine levels elevated   -Anemia likely related to B12 deficiency  -Folate wnl  -Iron panel wnl.  B12 supplementation as above  - F/u gastrin level and biopsies

## 2020-08-07 NOTE — PROGRESS NOTE ADULT - PROBLEM SELECTOR PLAN 1
-Suspect b/l lower extremity numbness and tingling is related to B12 deficiency  -Uncertain etiology of B12 deficiency - possibly intrinsic factor deficiency.  Denies a voluntary dietary restriction that might cause deficiency.  Does have vitiligo which may suggest autoimmune dysfunction.  Metformin also interferes with B12  -Given neurologic symptoms, will supplement IM to start with 1000 micrograms now and then approximately every 2 days.        Endorses dysuria - UA concerning for UTI.  Ceftriaxone (day 2) pending urine cultures. -Suspect b/l lower extremity numbness and tingling is related to B12 deficiency  -Uncertain etiology of B12 deficiency - possibly intrinsic factor deficiency.  Denies a voluntary dietary restriction that might cause deficiency.  Does have vitiligo which may suggest autoimmune dysfunction.  Metformin also interferes with B12  -Given neurologic symptoms, will supplement IM to start with 1000 micrograms now and then approximately every 2 days.

## 2020-08-07 NOTE — ADVANCED PRACTICE NURSE CONSULT - REASON FOR CONSULT
Patient seen on skin care rounds after wound care referral received for assessment of skin impairment and recommendations of topical management. Chart reviewed: Robin Rajan, WBC 5.75, Hemoglobin/HCT: 8.3/23.9, hemoglobin A1C 5.4%, UA+, BMI 27kg/m2, HTN, HLD, CAD s/p MI, T2DM, Asthma, reported hx of dVT who presents for b/l LE cramping pain and numbness.She reports about 2-3 weeks of cramping pain in her bilateral lower extremities.  She also endorses about 22 lbs weight loss over the past 2 weeks, some intermittent lightheadedness or dizziness, difficulty ambulating due to pain and numbness and tingling in her lower extremities.  She has had a rash being treated with clotrimazole.  No chest pain, shortness of breath, incontinence, but does endorse some dysuria.  Due to her lower extremity symptoms, she presented to [facility] ED for evaluation. Patient has being seeing by GI for anemia.In the ED, she was afebrile with unremarkable vital signs.  Diagnostics revealed a B12 of 150, normal folate, and a macrocytic anemia. She was admitted for further management.On evaluation, she gave the above history.  She reports 3 weeks of decreased appetite and poor PO intake.  She denies any prior dietary restriction or GI surgeries. Patient has being seeing by GI for anemia.

## 2020-08-07 NOTE — ADVANCED PRACTICE NURSE CONSULT - ASSESSMENT
A&Ox 4, impetigo, patient able to turn in bed independently, currently bedbound, continent of urine and stool. Skin warm, dry with increased moisture in intertriginous folds, scattered areas of hyperpigmentation and hypopigmentation, scattered areas of ecchymosis on bilateral upper extremities. Blanchable erythema on bilateral heels.     Severe Moisture associated dermatitis in bilateral breasts, ABD pannus and groin with suspected candidiasis as evident by moist and burning as per patient. Linear fissure in bilateral breasts.     Sacral fold extending to sacrococcygeum with 3 open ulcerations mixed severe Moisture associated dermatitis and pressure (patient with significant weight loss and reports less mobility secondary to DVT in leg). Largest ulcer distally measuring 1.5cmx0.5cmx0.3cm. All three ulcers presenting as full thickness injuries exposing red granulation tissue. Unable to be visualized in natural anatomical position. Small serous drainage. No odor thru N95 masks and surgical mask during COVID 19 pandemic. Periwound skin with hyperpigmentation and purple hue from moisture. Patient reports tenderness upon cleansing. Goals of care: continue to offload, manage drainage, monitor for tissue type changes, treat suspected candidiasis.      Bilateral inner buttocks with areas of denuded epidermis from moisture associated dermatitis " mirroring image".

## 2020-08-07 NOTE — PROGRESS NOTE ADULT - PROBLEM SELECTOR PLAN 7
Symbicort to replace prior advair.  Albuterol prn - Symbicort to replace prior advair.  Albuterol prn

## 2020-08-07 NOTE — PROGRESS NOTE ADULT - PROBLEM SELECTOR PROBLEM 7
Moderate persistent asthma without complication

## 2020-08-07 NOTE — PROGRESS NOTE ADULT - SUBJECTIVE AND OBJECTIVE BOX
PROGRESS NOTE:   Authored by Wai Zayas MD  PGY-1, Internal Medicine  Pager [facility] [phone], J [id number]     Patient is a 75y old  Female who presents with a chief complaint of CC: Lower extremity cramping/numbness (06 Aug 2020 21:29)      SUBJECTIVE / OVERNIGHT EVENTS: No events overnight. Patient feels tired this morning. She also said she was experiencing difficulty swallowing and some pain yesterday when eating, but noted that it has since improved. She has no issues swallowing liquids or solids now. Denies abdominal pain. She is experiencing some pain in her calves b/l which she says is new.     ADDITIONAL REVIEW OF SYSTEMS: Denies fevers, chills, n/v.    MEDICATIONS  (STANDING):  apixaban 10 milliGRAM(s) Oral every 12 hours  budesonide 160 MICROgram(s)/formoterol 4.5 MICROgram(s) Inhaler 2 Puff(s) Inhalation two times a day  clotrimazole 1% Cream 1 Application(s) Topical two times a day  cyanocobalamin Injectable 1000 MICROGram(s) IntraMuscular daily  enalapril 20 milliGRAM(s) Oral two times a day  levothyroxine 125 MICROGram(s) Oral daily  NIFEdipine XL 30 milliGRAM(s) Oral daily    MEDICATIONS  (PRN):  acetaminophen   Tablet .. 650 milliGRAM(s) Oral every 6 hours PRN Mild Pain (1 - 3), Moderate Pain (4 - 6), Severe Pain (7 - 10)  ALBUTerol    90 MICROgram(s) HFA Inhaler 2 Puff(s) Inhalation every 6 hours PRN Shortness of Breath and/or Wheezing  aluminum hydroxide/magnesium hydroxide/simethicone Suspension 30 milliLiter(s) Oral every 6 hours PRN Dyspepsia  LORazepam     Tablet 0.5 milliGRAM(s) Oral every 8 hours PRN Anxiety  ondansetron Injectable 4 milliGRAM(s) IV Push every 6 hours PRN Nausea      CAPILLARY BLOOD GLUCOSE      POCT Blood Glucose.: 97 mg/dL (07 Aug 2020 08:13)  POCT Blood Glucose.: 99 mg/dL (06 Aug 2020 22:16)  POCT Blood Glucose.: 77 mg/dL (06 Aug 2020 17:49)  POCT Blood Glucose.: 74 mg/dL (06 Aug 2020 14:17)  POCT Blood Glucose.: 70 mg/dL (06 Aug 2020 12:20)    I&O's Summary    06 Aug 2020 07:01  -  07 Aug 2020 07:00  --------------------------------------------------------  IN: 250 mL / OUT: 0 mL / NET: 250 mL        PHYSICAL EXAM:  Vital Signs Last 24 Hrs  T(C): 36.8 (07 Aug 2020 06:04), Max: 37 (06 Aug 2020 15:58)  T(F): 98.3 (07 Aug 2020 06:04), Max: 98.6 (06 Aug 2020 15:58)  HR: 77 (07 Aug 2020 06:04) (59 - 99)  BP: 120/72 (07 Aug 2020 06:04) (85/53 - 141/64)  BP(mean): 82 (06 Aug 2020 11:45) (65 - 82)  RR: 16 (07 Aug 2020 06:04) (12 - 18)  SpO2: 97% (07 Aug 2020 06:04) (97% - 100%)    CONSTITUTIONAL: NAD, lying in bed comfortably  RESPIRATORY: Normal respiratory effort; CTABL  CARDIOVASCULAR: Regular rate and rhythm, normal S1 and S2, no murmur/rub/gallop  ABDOMEN: Soft, nondistended, nontender to palpation, normoactive bowel sounds, no rebound/guarding  MUSCLOSKELETAL: no joint swelling or tenderness to palpation, FROM all extremities  NEURO: AAOx3 to person, place, and time, full and equal strength all extremities   EXTREMITIES: no pedal edema    LABS:                        8.3    5.75  )-----------( 189      ( 07 Aug 2020 05:30 )             23.9     08-07    139  |  99  |  18  ----------------------------<  86  3.9   |  24  |  1.08    Ca    10.5      07 Aug 2020 05:30  Phos  2.5     08-07  Mg     2.1     08-07    TPro  7.8  /  Alb  3.8  /  TBili  1.0  /  DBili  x   /  AST  14  /  ALT  8   /  AlkPhos  95  08-06    PT/INR - ( 06 Aug 2020 04:20 )   PT: 13.7 SEC;   INR: 1.21          PTT - ( 06 Aug 2020 04:20 )  PTT:81.5 SEC          Culture - Urine (collected 05 Aug 2020 06:09)  Source: .Urine Clean Catch (Midstream)  Preliminary Report (06 Aug 2020 22:11):    >100,000 CFU/ml Escherichia coli        RADIOLOGY & ADDITIONAL TESTS: PROGRESS NOTE:   Authored by Wai Zayas MD  PGY-1, Internal Medicine  Pager [facility] [phone], J [id number]     Patient is a 75y old  Female who presents with a chief complaint of CC: Lower extremity cramping/numbness (06 Aug 2020 21:29)      SUBJECTIVE / OVERNIGHT EVENTS: No events overnight. Patient feels tired this morning. She also said she was experiencing difficulty swallowing and some pain yesterday when eating, but noted that it has since improved. She has no issues swallowing liquids or solids now. Denies abdominal pain. She is experiencing some pain in her calves b/l which she says is new.     ADDITIONAL REVIEW OF SYSTEMS: Denies fevers, chills, n/v.    MEDICATIONS  (STANDING):  apixaban 10 milliGRAM(s) Oral every 12 hours  budesonide 160 MICROgram(s)/formoterol 4.5 MICROgram(s) Inhaler 2 Puff(s) Inhalation two times a day  clotrimazole 1% Cream 1 Application(s) Topical two times a day  cyanocobalamin Injectable 1000 MICROGram(s) IntraMuscular daily  enalapril 20 milliGRAM(s) Oral two times a day  levothyroxine 125 MICROGram(s) Oral daily  NIFEdipine XL 30 milliGRAM(s) Oral daily    MEDICATIONS  (PRN):  acetaminophen   Tablet .. 650 milliGRAM(s) Oral every 6 hours PRN Mild Pain (1 - 3), Moderate Pain (4 - 6), Severe Pain (7 - 10)  ALBUTerol    90 MICROgram(s) HFA Inhaler 2 Puff(s) Inhalation every 6 hours PRN Shortness of Breath and/or Wheezing  aluminum hydroxide/magnesium hydroxide/simethicone Suspension 30 milliLiter(s) Oral every 6 hours PRN Dyspepsia  LORazepam     Tablet 0.5 milliGRAM(s) Oral every 8 hours PRN Anxiety  ondansetron Injectable 4 milliGRAM(s) IV Push every 6 hours PRN Nausea      CAPILLARY BLOOD GLUCOSE      POCT Blood Glucose.: 97 mg/dL (07 Aug 2020 08:13)  POCT Blood Glucose.: 99 mg/dL (06 Aug 2020 22:16)  POCT Blood Glucose.: 77 mg/dL (06 Aug 2020 17:49)  POCT Blood Glucose.: 74 mg/dL (06 Aug 2020 14:17)  POCT Blood Glucose.: 70 mg/dL (06 Aug 2020 12:20)    I&O's Summary    06 Aug 2020 07:01  -  07 Aug 2020 07:00  --------------------------------------------------------  IN: 250 mL / OUT: 0 mL / NET: 250 mL        PHYSICAL EXAM:  Vital Signs Last 24 Hrs  T(C): 36.8 (07 Aug 2020 06:04), Max: 37 (06 Aug 2020 15:58)  T(F): 98.3 (07 Aug 2020 06:04), Max: 98.6 (06 Aug 2020 15:58)  HR: 77 (07 Aug 2020 06:04) (59 - 99)  BP: 120/72 (07 Aug 2020 06:04) (85/53 - 141/64)  BP(mean): 82 (06 Aug 2020 11:45) (65 - 82)  RR: 16 (07 Aug 2020 06:04) (12 - 18)  SpO2: 97% (07 Aug 2020 06:04) (97% - 100%)    CONSTITUTIONAL: NAD, lying in bed comfortably  RESPIRATORY: Normal respiratory effort; CTABL  CARDIOVASCULAR: Regular rate and rhythm, normal S1 and S2, no murmur/rub/gallop  ABDOMEN: Soft, nondistended, nontender to palpation, normoactive bowel sounds, no rebound/guarding  MUSCLOSKELETAL: no joint swelling, FROM all extremities  NEURO: AAOx3 to person, place, and time,  4+ strength in legs b/l, numbness and tingling in feet b/l  EXTREMITIES: mild tenderness to palpation of calves b/l, no pedal edema    LABS:                        8.3    5.75  )-----------( 189      ( 07 Aug 2020 05:30 )             23.9     08-07    139  |  99  |  18  ----------------------------<  86  3.9   |  24  |  1.08    Ca    10.5      07 Aug 2020 05:30  Phos  2.5     08-07  Mg     2.1     08-07    TPro  7.8  /  Alb  3.8  /  TBili  1.0  /  DBili  x   /  AST  14  /  ALT  8   /  AlkPhos  95  08-06    PT/INR - ( 06 Aug 2020 04:20 )   PT: 13.7 SEC;   INR: 1.21          PTT - ( 06 Aug 2020 04:20 )  PTT:81.5 SEC          Culture - Urine (collected 05 Aug 2020 06:09)  Source: .Urine Clean Catch (Midstream)  Preliminary Report (06 Aug 2020 22:11):    >100,000 CFU/ml Escherichia coli        RADIOLOGY & ADDITIONAL TESTS: PROGRESS NOTE:   Authored by Wai Zayas MD  PGY-1, Internal Medicine  Pager [facility] [phone], J [id number]     Patient is a 75y old  Female who presents with a chief complaint of CC: Lower extremity cramping/numbness (06 Aug 2020 21:29)    SUBJECTIVE / OVERNIGHT EVENTS: No events overnight. Patient feels tired this morning. She also said she was experiencing difficulty swallowing and some pain yesterday when eating, but noted that it has since improved. She has no issues swallowing liquids or solids now. Denies abdominal pain. She is experiencing some pain in her calves b/l which she says is new.     ADDITIONAL REVIEW OF SYSTEMS: Denies fevers, chills, n/v.    MEDICATIONS  (STANDING):  apixaban 10 milliGRAM(s) Oral every 12 hours  budesonide 160 MICROgram(s)/formoterol 4.5 MICROgram(s) Inhaler 2 Puff(s) Inhalation two times a day  clotrimazole 1% Cream 1 Application(s) Topical two times a day  cyanocobalamin Injectable 1000 MICROGram(s) IntraMuscular daily  enalapril 20 milliGRAM(s) Oral two times a day  levothyroxine 125 MICROGram(s) Oral daily  NIFEdipine XL 30 milliGRAM(s) Oral daily    MEDICATIONS  (PRN):  acetaminophen   Tablet .. 650 milliGRAM(s) Oral every 6 hours PRN Mild Pain (1 - 3), Moderate Pain (4 - 6), Severe Pain (7 - 10)  ALBUTerol    90 MICROgram(s) HFA Inhaler 2 Puff(s) Inhalation every 6 hours PRN Shortness of Breath and/or Wheezing  aluminum hydroxide/magnesium hydroxide/simethicone Suspension 30 milliLiter(s) Oral every 6 hours PRN Dyspepsia  LORazepam     Tablet 0.5 milliGRAM(s) Oral every 8 hours PRN Anxiety  ondansetron Injectable 4 milliGRAM(s) IV Push every 6 hours PRN Nausea    CAPILLARY BLOOD GLUCOSE  POCT Blood Glucose.: 97 mg/dL (07 Aug 2020 08:13)  POCT Blood Glucose.: 99 mg/dL (06 Aug 2020 22:16)  POCT Blood Glucose.: 77 mg/dL (06 Aug 2020 17:49)  POCT Blood Glucose.: 74 mg/dL (06 Aug 2020 14:17)  POCT Blood Glucose.: 70 mg/dL (06 Aug 2020 12:20)    I&O's Summary    06 Aug 2020 07:01  -  07 Aug 2020 07:00  --------------------------------------------------------  IN: 250 mL / OUT: 0 mL / NET: 250 mL    PHYSICAL EXAM:  Vital Signs Last 24 Hrs  T(C): 36.8 (07 Aug 2020 06:04), Max: 37 (06 Aug 2020 15:58)  T(F): 98.3 (07 Aug 2020 06:04), Max: 98.6 (06 Aug 2020 15:58)  HR: 77 (07 Aug 2020 06:04) (59 - 99)  BP: 120/72 (07 Aug 2020 06:04) (85/53 - 141/64)  BP(mean): 82 (06 Aug 2020 11:45) (65 - 82)  RR: 16 (07 Aug 2020 06:04) (12 - 18)  SpO2: 97% (07 Aug 2020 06:04) (97% - 100%)    CONSTITUTIONAL: NAD, lying in bed comfortably  RESPIRATORY: Normal respiratory effort; CTABL  CARDIOVASCULAR: Regular rate and rhythm, normal S1 and S2, no murmur/rub/gallop  ABDOMEN: Soft, nondistended, nontender to palpation, normoactive bowel sounds, no rebound/guarding  MUSCLOSKELETAL: no joint swelling, FROM all extremities  NEURO: AAOx3 to person, place, and time,  4+ strength in legs b/l, numbness and tingling in feet b/l  EXTREMITIES: mild tenderness to palpation of calves b/l, no pedal edema    LABS:                        8.3    5.75  )-----------( 189      ( 07 Aug 2020 05:30 )             23.9     08-07    139  |  99  |  18  ----------------------------<  86  3.9   |  24  |  1.08    Ca    10.5      07 Aug 2020 05:30  Phos  2.5     08-07  Mg     2.1     08-07    TPro  7.8  /  Alb  3.8  /  TBili  1.0  /  DBili  x   /  AST  14  /  ALT  8   /  AlkPhos  95  08-06    PT/INR - ( 06 Aug 2020 04:20 )   PT: 13.7 SEC;   INR: 1.21     PTT - ( 06 Aug 2020 04:20 )  PTT:81.5 SEC      Culture - Urine (collected 05 Aug 2020 06:09)  Source: .Urine Clean Catch (Midstream)  Preliminary Report (06 Aug 2020 22:11):    >100,000 CFU/ml Escherichia coli

## 2020-08-07 NOTE — ADVANCED PRACTICE NURSE CONSULT - RECOMMEDATIONS
Seat cushion if out of bed to the chair   Upon discharge, Recommend follow up care at [facility]: [phone]. Address: [address] [place] [address].     Topical Recommendations    Bilateral breasts, ABD pannus and bilateral buttocks: Clean with soap and water. Pat dry. Sprinkle nystatin powder to entire area. Apply twice day.      All intertriginous folds: Clean with soap and water. Pat dry. Place Interdry textile sheeting, under intertriginous folds leaving 2 inches exposed at ends to wick, remove to wash & dry affected area, then replace. Individual sheeting may be used for up to 5 days unless soiled.     Sacral fold and bilateral buttocks: Clean with NS. Pat dry. Sprinkle nystatin powder. Apply nystatin powder Over entire area. Apply TRIAD barrier cream/hydrophillic dressing to entire area over nystatin powder. Apply twice a day.     Bilateral heels: Apply Liquid barrier film daily.     Continue low air loss bed therapy,heel elevation with Z-flex fluidized positioning boots,  turn & reposition q2h with Z-marielos fluidized positioning device, soft pillow between bony prominences, continue moisture management with barrier creams & single breathable pad, continue measures to decrease friction/shear/pressure.     Please contact Wound Care Service Line if we can be of further assistance (ext [phone]).

## 2020-08-07 NOTE — PROGRESS NOTE ADULT - REASON FOR ADMISSION
CC: Lower extremity cramping/numbness

## 2020-08-07 NOTE — PROGRESS NOTE ADULT - ASSESSMENT
76 yo F with a past medical history of HTN, HLD, CAD s/p MI, T2DM, Asthma, reported hx of dVT who presents for b/l LE cramping pain and numbness dc with B12 def likely due to pernicious anemia, EGD on 8/6 with atrophic gastritis.

## 2020-08-07 NOTE — PROGRESS NOTE ADULT - ASSESSMENT
Impression:   #Atrophic gastritis: pt w/ EGD showing severe atrophic gastritis diffusely throughout with multiple biopsies taken (incisura, lesser/greater curvature of body + antrum). Ddx: may be due to B12 deficiency from diet vs H pylori infection (biopsies pending) vs pernicious anemia (intrinsic factor ab indeterminate)   #Macrocytic anemia 2/2 B12 deficiency: pt w/ Hb 8.3 w/ MCV 120s w/ low B12 (150s). Also endorsing Hx of 22lb weight loss, loss of appetite, early satiety which are concerning warning signs.     Ddx would consider atrophic gastritis potentially 2/2 pernicious anemia vs malignancy vs poor po intake.   #DVT: pt w/ multiple thrombi in left common iliac vein, external illiac vein, and femoral vein.         Recommendations:   - Please send off intrinsic factor ab  - Would initiate B12 replacement at this time, per primary team   - Trend CBC daily, transfuse Hb < 7  - Please consider heparin gtt (hold at 6AM prior to EGD)   - Make NPO at [facility] for potential EGD tomorrow  - Tentatively planned for EGD tomorrow pending CT findings and if patient amenable      Thank you for involving us in the care of this patient, please reach out if any further questions.     Diomedes Arguelles MD  Gastroenterology Fellow, PGY4    Available on Microsoft Teams  [phone] ([facility])  [phone] ([facility])  Please contact on call fellow weekdays after 5pm-7am and weekends: [phone] Impression:   #Atrophic gastritis: pt w/ EGD showing severe atrophic gastritis diffusely throughout with multiple biopsies taken (incisura, lesser/greater curvature of body + antrum). Ddx: may be due to B12 deficiency from diet vs H pylori infection (biopsies pending) vs pernicious anemia (intrinsic factor ab indeterminate). EGD w/o obvious malignancy, biopsies pending.   #Macrocytic anemia 2/2 B12 deficiency: pt w/ Hb 8.3 w/ MCV 120s w/ low B12 (150s). Likely 2/2 atrophic gastritis, see above.   #DVT: pt w/ multiple thrombi in left common iliac vein, external illiac vein, and femoral vein. S/p heparin gtt, now transitioned to eliquis.         Recommendations:   - Please send off gastrin level (if >200 more specific for pernicious anemia)  - Would initiate B12 replacement at this time, per primary team   - F/u pathology from EGD biopsies in the setting of atrophic gastritis (r/o H pylori, malignancy)  - Pt needs outpatient f/u with GI, please provide # on discharge [phone]       Thank you for involving us in the care of this patient, please reach out if any further questions.     Diomedes Arguelles MD  Gastroenterology Fellow, PGY4    Available on Microsoft Teams  [phone] ([facility])  [phone] ([facility])  Please contact on call fellow weekdays after 5pm-7am and weekends: [phone] Impression:   #Atrophic gastritis: pt w/ EGD showing severe atrophic gastritis diffusely throughout with multiple biopsies taken (incisura, lesser/greater curvature of body + antrum). Ddx: may be due to B12 deficiency from diet vs H pylori infection (biopsies pending) vs pernicious anemia (intrinsic factor ab indeterminate). EGD w/o obvious malignancy, biopsies pending.   #Macrocytic anemia 2/2 B12 deficiency: pt w/ Hb 8.3 w/ MCV 120s w/ low B12 (150s). Likely 2/2 atrophic gastritis, see above.   #DVT: pt w/ multiple thrombi in left common iliac vein, external illiac vein, and femoral vein. S/p heparin gtt, now transitioned to eliquis.         Recommendations:   - Please send off gastrin level (if >200 more specific for pernicious anemia)  - Would initiate B12 replacement at this time, per primary team   - F/u pathology from EGD biopsies in the setting of atrophic gastritis (r/o H pylori, malignancy)  - **Pt needs outpatient f/u with GI at [facility], please provide # on discharge [phone]       Thank you for involving us in the care of this patient, please reach out if any further questions.     Diomedes Arguelles MD  Gastroenterology Fellow, PGY4    Available on Microsoft Teams  [phone] ([facility])  [phone] ([facility])  Please contact on call fellow weekdays after 5pm-7am and weekends: [phone]

## 2020-08-07 NOTE — PROGRESS NOTE ADULT - PROBLEM SELECTOR PLAN 9
Improve score 2 for poor mobility and age.  - Heparin gtt (DVT on CT a/p) Improve score 2 for poor mobility and age.  - Switched to Eliquis

## 2020-08-07 NOTE — PROGRESS NOTE ADULT - PROBLEM SELECTOR PROBLEM 3
Anemia due to vitamin B12 deficiency, unspecified B12 deficiency type

## 2020-08-07 NOTE — PROGRESS NOTE ADULT - SUBJECTIVE AND OBJECTIVE BOX
Chief Complaint:  Patient is a 75y old  Female who presents with a chief complaint of CC: Lower extremity cramping/numbness (07 Aug 2020 09:20)      Interval Events:   - Pt reports she feels much better overall today and would like to go home  - Continues to have mild b/l leg pain and cramping however much improved from prior  - EGD showed severe atrophic gastritis   - Intrinsic factor Ab indeterminate       Allergies:  Apple Juice (Rash)  Carrots (Rash)  No Known Drug Allergies  pork (Rash)        Hospital Medications:  acetaminophen   Tablet .. 650 milliGRAM(s) Oral every 6 hours PRN  ALBUTerol    90 MICROgram(s) HFA Inhaler 2 Puff(s) Inhalation every 6 hours PRN  aluminum hydroxide/magnesium hydroxide/simethicone Suspension 30 milliLiter(s) Oral every 6 hours PRN  apixaban 10 milliGRAM(s) Oral every 12 hours  budesonide 160 MICROgram(s)/formoterol 4.5 MICROgram(s) Inhaler 2 Puff(s) Inhalation two times a day  clotrimazole 1% Cream 1 Application(s) Topical two times a day  cyanocobalamin Injectable 1000 MICROGram(s) IntraMuscular daily  enalapril 20 milliGRAM(s) Oral two times a day  levothyroxine 125 MICROGram(s) Oral daily  LORazepam     Tablet 0.5 milliGRAM(s) Oral every 8 hours PRN  NIFEdipine XL 30 milliGRAM(s) Oral daily  ondansetron Injectable 4 milliGRAM(s) IV Push every 6 hours PRN      PMHX/PSHX:  Leg swelling  DVT (deep venous thrombosis), right  Hypothyroid  Ventral hernia  Diabetes  Asthma  Essential hypertension  No significant past surgical history      Family history:  FH: diabetes mellitus  No pertinent family history in first degree relatives  No pertinent family history in first degree relatives      ROS:     General:  No wt loss, fevers, chills, night sweats, fatigue,   Eyes:  Good vision, no reported pain  ENT:  No sore throat, pain, runny nose, dysphagia  CV:  No pain, palpitations, hypo/hypertension  Pulm:  No dyspnea, cough, tachypnea, wheezing  GI:  No pain, No nausea, No vomiting, No diarrhea, No constipation, No weight loss, No fever, No pruritis, No rectal bleeding, No tarry stools, No dysphagia  :  No pain, bleeding, incontinence, nocturia  Muscle:  No pain, weakness  Neuro:  No weakness, tingling, memory problems  Psych:  No fatigue, insomnia, mood problems, depression  Endocrine:  No polyuria, polydipsia, cold/heat intolerance  Heme:  No petechiae, ecchymosis, easy bruisability  Skin:  No rash, tattoos, scars, edema      PHYSICAL EXAM:   Vital Signs:  Vital Signs Last 24 Hrs  T(C): 36.8 (07 Aug 2020 06:04), Max: 37 (06 Aug 2020 15:58)  T(F): 98.3 (07 Aug 2020 06:04), Max: 98.6 (06 Aug 2020 15:58)  HR: 77 (07 Aug 2020 06:04) (59 - 99)  BP: 120/72 (07 Aug 2020 06:04) (85/53 - 141/64)  BP(mean): 82 (06 Aug 2020 11:45) (65 - 82)  RR: 16 (07 Aug 2020 06:04) (12 - 18)  SpO2: 97% (07 Aug 2020 06:04) (97% - 100%)  Daily     Daily     GENERAL:  No acute distress, elderly pleasant,   HEENT:  Normocephalic/atraumatic, no scleral icterus, +glossitis   CHEST:  Clear to auscultation bilaterally, no wheezes/rales/ronchi, no accessory muscle use  HEART:  Regular rate and rhythm, no murmurs/rubs/gallops  ABDOMEN:  Soft, non-tender, +mildly distended, normoactive bowel sounds,  no masses, no hepato-splenomegaly, no signs of chronic liver disease  EXTREMITIES: wwp  SKIN: +vitiligo   NEURO:  Alert and oriented x 3    LABS:                        8.3    5.75  )-----------( 189      ( 07 Aug 2020 05:30 )             23.9     Mean Cell Volume: 126.5 fL (08-07-20 @ 05:30)    08-07    139  |  99  |  18  ----------------------------<  86  3.9   |  24  |  1.08    Ca    10.5      07 Aug 2020 05:30  Phos  2.5     08-07  Mg     2.1     08-07    TPro  7.8  /  Alb  3.8  /  TBili  1.0  /  DBili  x   /  AST  14  /  ALT  8   /  AlkPhos  95  08-06    LIVER FUNCTIONS - ( 06 Aug 2020 04:20 )  Alb: 3.8 g/dL / Pro: 7.8 g/dL / ALK PHOS: 95 u/L / ALT: 8 u/L / AST: 14 u/L / GGT: x           PT/INR - ( 06 Aug 2020 04:20 )   PT: 13.7 SEC;   INR: 1.21          PTT - ( 06 Aug 2020 04:20 )  PTT:81.5 SEC                            8.3    5.75  )-----------( 189      ( 07 Aug 2020 05:30 )             23.9                         7.9    4.92  )-----------( 181      ( 06 Aug 2020 15:30 )             22.8                         8.4    6.54  )-----------( 198      ( 06 Aug 2020 04:20 )             25.3                         8.3    3.96  )-----------( 186      ( 05 Aug 2020 07:15 )             25.0                         8.9    4.37  )-----------( 187      ( 04 Aug 2020 22:00 )             26.3       Imaging:

## 2020-08-08 ENCOUNTER — INPATIENT (INPATIENT)
Facility: HOSPITAL | Age: 75
LOS: 4 days | Discharge: HOME | End: 2020-08-13
Attending: HOSPITALIST | Admitting: HOSPITALIST
Payer: MEDICARE

## 2020-08-08 VITALS
HEART RATE: 84 BPM | DIASTOLIC BLOOD PRESSURE: 54 MMHG | TEMPERATURE: 98 F | SYSTOLIC BLOOD PRESSURE: 97 MMHG | RESPIRATION RATE: 17 BRPM | HEIGHT: 62 IN | OXYGEN SATURATION: 96 %

## 2020-08-08 DIAGNOSIS — Z29.9 ENCOUNTER FOR PROPHYLACTIC MEASURES, UNSPECIFIED: ICD-10-CM

## 2020-08-08 DIAGNOSIS — E11.9 TYPE 2 DIABETES MELLITUS WITHOUT COMPLICATIONS: ICD-10-CM

## 2020-08-08 DIAGNOSIS — E53.8 DEFICIENCY OF OTHER SPECIFIED B GROUP VITAMINS: ICD-10-CM

## 2020-08-08 DIAGNOSIS — R29.898 OTHER SYMPTOMS AND SIGNS INVOLVING THE MUSCULOSKELETAL SYSTEM: ICD-10-CM

## 2020-08-08 DIAGNOSIS — I25.10 ATHEROSCLEROTIC HEART DISEASE OF NATIVE CORONARY ARTERY WITHOUT ANGINA PECTORIS: ICD-10-CM

## 2020-08-08 DIAGNOSIS — J45.909 UNSPECIFIED ASTHMA, UNCOMPLICATED: ICD-10-CM

## 2020-08-08 DIAGNOSIS — N17.9 ACUTE KIDNEY FAILURE, UNSPECIFIED: ICD-10-CM

## 2020-08-08 DIAGNOSIS — I82.403 ACUTE EMBOLISM AND THROMBOSIS OF UNSPECIFIED DEEP VEINS OF LOWER EXTREMITY, BILATERAL: ICD-10-CM

## 2020-08-08 DIAGNOSIS — E03.9 HYPOTHYROIDISM, UNSPECIFIED: ICD-10-CM

## 2020-08-08 DIAGNOSIS — I10 ESSENTIAL (PRIMARY) HYPERTENSION: ICD-10-CM

## 2020-08-08 LAB
-  AMPICILLIN: SIGNIFICANT CHANGE UP
-  CIPROFLOXACIN: SIGNIFICANT CHANGE UP
-  LEVOFLOXACIN: SIGNIFICANT CHANGE UP
-  NITROFURANTOIN: SIGNIFICANT CHANGE UP
-  TETRACYCLINE: SIGNIFICANT CHANGE UP
-  VANCOMYCIN: SIGNIFICANT CHANGE UP
ALBUMIN SERPL ELPH-MCNC: 3.7 G/DL — SIGNIFICANT CHANGE UP (ref 3.3–5)
ALP SERPL-CCNC: 79 U/L — SIGNIFICANT CHANGE UP (ref 40–120)
ALT FLD-CCNC: 8 U/L — SIGNIFICANT CHANGE UP (ref 4–33)
ANION GAP SERPL CALC-SCNC: 13 MMO/L — SIGNIFICANT CHANGE UP (ref 7–14)
ANION GAP SERPL CALC-SCNC: 13 MMO/L — SIGNIFICANT CHANGE UP (ref 7–14)
ANISOCYTOSIS BLD QL: SIGNIFICANT CHANGE UP
AST SERPL-CCNC: 16 U/L — SIGNIFICANT CHANGE UP (ref 4–32)
BASOPHILS # BLD AUTO: 0.06 K/UL — SIGNIFICANT CHANGE UP (ref 0–0.2)
BASOPHILS NFR BLD AUTO: 0.8 % — SIGNIFICANT CHANGE UP (ref 0–2)
BASOPHILS NFR SPEC: 0 % — SIGNIFICANT CHANGE UP (ref 0–2)
BILIRUB SERPL-MCNC: 0.6 MG/DL — SIGNIFICANT CHANGE UP (ref 0.2–1.2)
BLASTS # FLD: 0 % — SIGNIFICANT CHANGE UP (ref 0–0)
BUN SERPL-MCNC: 18 MG/DL — SIGNIFICANT CHANGE UP (ref 7–23)
BUN SERPL-MCNC: 20 MG/DL — SIGNIFICANT CHANGE UP (ref 7–23)
CALCIUM SERPL-MCNC: 10.5 MG/DL — SIGNIFICANT CHANGE UP (ref 8.4–10.5)
CALCIUM SERPL-MCNC: 10.5 MG/DL — SIGNIFICANT CHANGE UP (ref 8.4–10.5)
CHLORIDE SERPL-SCNC: 101 MMOL/L — SIGNIFICANT CHANGE UP (ref 98–107)
CHLORIDE SERPL-SCNC: 102 MMOL/L — SIGNIFICANT CHANGE UP (ref 98–107)
CO2 SERPL-SCNC: 24 MMOL/L — SIGNIFICANT CHANGE UP (ref 22–31)
CO2 SERPL-SCNC: 24 MMOL/L — SIGNIFICANT CHANGE UP (ref 22–31)
CREAT SERPL-MCNC: 1.16 MG/DL — SIGNIFICANT CHANGE UP (ref 0.5–1.3)
CREAT SERPL-MCNC: 1.45 MG/DL — HIGH (ref 0.5–1.3)
CULTURE RESULTS: SIGNIFICANT CHANGE UP
EOSINOPHIL # BLD AUTO: 0.43 K/UL — SIGNIFICANT CHANGE UP (ref 0–0.5)
EOSINOPHIL NFR BLD AUTO: 6 % — SIGNIFICANT CHANGE UP (ref 0–6)
EOSINOPHIL NFR FLD: 11.4 % — HIGH (ref 0–6)
GIANT PLATELETS BLD QL SMEAR: PRESENT — SIGNIFICANT CHANGE UP
GLUCOSE BLDC GLUCOMTR-MCNC: 105 MG/DL — HIGH (ref 70–99)
GLUCOSE BLDC GLUCOMTR-MCNC: 108 MG/DL — HIGH (ref 70–99)
GLUCOSE BLDC GLUCOMTR-MCNC: 109 MG/DL — HIGH (ref 70–99)
GLUCOSE BLDC GLUCOMTR-MCNC: 110 MG/DL — HIGH (ref 70–99)
GLUCOSE SERPL-MCNC: 101 MG/DL — HIGH (ref 70–99)
GLUCOSE SERPL-MCNC: 96 MG/DL — SIGNIFICANT CHANGE UP (ref 70–99)
HCT VFR BLD CALC: 22 % — LOW (ref 34.5–45)
HGB BLD-MCNC: 7.8 G/DL — LOW (ref 11.5–15.5)
HYPOCHROMIA BLD QL: SLIGHT — SIGNIFICANT CHANGE UP
IMM GRANULOCYTES NFR BLD AUTO: 6.5 % — HIGH (ref 0–1.5)
LYMPHOCYTES # BLD AUTO: 2.95 K/UL — SIGNIFICANT CHANGE UP (ref 1–3.3)
LYMPHOCYTES # BLD AUTO: 41.4 % — SIGNIFICANT CHANGE UP (ref 13–44)
LYMPHOCYTES NFR SPEC AUTO: 38.6 % — SIGNIFICANT CHANGE UP (ref 13–44)
MACROCYTES BLD QL: SIGNIFICANT CHANGE UP
MCHC RBC-ENTMCNC: 35.5 % — SIGNIFICANT CHANGE UP (ref 32–36)
MCHC RBC-ENTMCNC: 44.6 PG — HIGH (ref 27–34)
MCV RBC AUTO: 125.7 FL — HIGH (ref 80–100)
METAMYELOCYTES # FLD: 3.5 % — HIGH (ref 0–1)
METHOD TYPE: SIGNIFICANT CHANGE UP
MONOCYTES # BLD AUTO: 0.84 K/UL — SIGNIFICANT CHANGE UP (ref 0–0.9)
MONOCYTES NFR BLD AUTO: 11.8 % — SIGNIFICANT CHANGE UP (ref 2–14)
MONOCYTES NFR BLD: 7 % — SIGNIFICANT CHANGE UP (ref 2–9)
MYELOCYTES NFR BLD: 1.8 % — HIGH (ref 0–0)
NEUTROPHIL AB SER-ACNC: 35.1 % — LOW (ref 43–77)
NEUTROPHILS # BLD AUTO: 2.38 K/UL — SIGNIFICANT CHANGE UP (ref 1.8–7.4)
NEUTROPHILS NFR BLD AUTO: 33.5 % — LOW (ref 43–77)
NEUTS BAND # BLD: 0.9 % — SIGNIFICANT CHANGE UP (ref 0–6)
NRBC # BLD: 1 /100WBC — SIGNIFICANT CHANGE UP
NRBC # FLD: 0.11 K/UL — SIGNIFICANT CHANGE UP (ref 0–0)
NRBC FLD-RTO: 1.5 — SIGNIFICANT CHANGE UP
ORGANISM # SPEC MICROSCOPIC CNT: SIGNIFICANT CHANGE UP
OTHER - HEMATOLOGY %: 0 — SIGNIFICANT CHANGE UP
PLATELET # BLD AUTO: 192 K/UL — SIGNIFICANT CHANGE UP (ref 150–400)
PLATELET COUNT - ESTIMATE: NORMAL — SIGNIFICANT CHANGE UP
PMV BLD: 12.1 FL — SIGNIFICANT CHANGE UP (ref 7–13)
POIKILOCYTOSIS BLD QL AUTO: SLIGHT — SIGNIFICANT CHANGE UP
POLYCHROMASIA BLD QL SMEAR: SLIGHT — SIGNIFICANT CHANGE UP
POTASSIUM SERPL-MCNC: 3.8 MMOL/L — SIGNIFICANT CHANGE UP (ref 3.5–5.3)
POTASSIUM SERPL-MCNC: 4.1 MMOL/L — SIGNIFICANT CHANGE UP (ref 3.5–5.3)
POTASSIUM SERPL-SCNC: 3.8 MMOL/L — SIGNIFICANT CHANGE UP (ref 3.5–5.3)
POTASSIUM SERPL-SCNC: 4.1 MMOL/L — SIGNIFICANT CHANGE UP (ref 3.5–5.3)
PROMYELOCYTES # FLD: 0 % — SIGNIFICANT CHANGE UP (ref 0–0)
PROT SERPL-MCNC: 7.1 G/DL — SIGNIFICANT CHANGE UP (ref 6–8.3)
RBC # BLD: 1.75 M/UL — LOW (ref 3.8–5.2)
RBC # FLD: 14.5 % — SIGNIFICANT CHANGE UP (ref 10.3–14.5)
REVIEW TO FOLLOW: YES — SIGNIFICANT CHANGE UP
SARS-COV-2 RNA SPEC QL NAA+PROBE: SIGNIFICANT CHANGE UP
SCHISTOCYTES BLD QL AUTO: SLIGHT — SIGNIFICANT CHANGE UP
SMUDGE CELLS # BLD: PRESENT — SIGNIFICANT CHANGE UP
SODIUM SERPL-SCNC: 138 MMOL/L — SIGNIFICANT CHANGE UP (ref 135–145)
SODIUM SERPL-SCNC: 139 MMOL/L — SIGNIFICANT CHANGE UP (ref 135–145)
SPECIMEN SOURCE: SIGNIFICANT CHANGE UP
VARIANT LYMPHS # BLD: 1.7 % — SIGNIFICANT CHANGE UP
VIT B12 SERPL-MCNC: > 2000 PG/ML — HIGH (ref 200–900)
WBC # BLD: 7.12 K/UL — SIGNIFICANT CHANGE UP (ref 3.8–10.5)
WBC # FLD AUTO: 7.12 K/UL — SIGNIFICANT CHANGE UP (ref 3.8–10.5)

## 2020-08-08 PROCEDURE — 93970 EXTREMITY STUDY: CPT | Mod: 26

## 2020-08-08 PROCEDURE — 99285 EMERGENCY DEPT VISIT HI MDM: CPT

## 2020-08-08 PROCEDURE — 99223 1ST HOSP IP/OBS HIGH 75: CPT

## 2020-08-08 PROCEDURE — 72131 CT LUMBAR SPINE W/O DYE: CPT | Mod: 26

## 2020-08-08 RX ORDER — ACETAMINOPHEN 500 MG
650 TABLET ORAL ONCE
Refills: 0 | Status: COMPLETED | OUTPATIENT
Start: 2020-08-08 | End: 2020-08-08

## 2020-08-08 RX ORDER — INSULIN LISPRO 100/ML
VIAL (ML) SUBCUTANEOUS
Refills: 0 | Status: DISCONTINUED | OUTPATIENT
Start: 2020-08-08 | End: 2020-08-13

## 2020-08-08 RX ORDER — DEXTROSE 50 % IN WATER 50 %
25 SYRINGE (ML) INTRAVENOUS ONCE
Refills: 0 | Status: DISCONTINUED | OUTPATIENT
Start: 2020-08-08 | End: 2020-08-13

## 2020-08-08 RX ORDER — ALBUTEROL 90 UG/1
2 AEROSOL, METERED ORAL EVERY 6 HOURS
Refills: 0 | Status: DISCONTINUED | OUTPATIENT
Start: 2020-08-08 | End: 2020-08-13

## 2020-08-08 RX ORDER — SODIUM CHLORIDE 9 MG/ML
1000 INJECTION, SOLUTION INTRAVENOUS
Refills: 0 | Status: DISCONTINUED | OUTPATIENT
Start: 2020-08-08 | End: 2020-08-13

## 2020-08-08 RX ORDER — BUDESONIDE AND FORMOTEROL FUMARATE DIHYDRATE 160; 4.5 UG/1; UG/1
2 AEROSOL RESPIRATORY (INHALATION)
Refills: 0 | Status: DISCONTINUED | OUTPATIENT
Start: 2020-08-08 | End: 2020-08-13

## 2020-08-08 RX ORDER — SIMVASTATIN 20 MG/1
20 TABLET, FILM COATED ORAL AT BEDTIME
Refills: 0 | Status: DISCONTINUED | OUTPATIENT
Start: 2020-08-08 | End: 2020-08-13

## 2020-08-08 RX ORDER — APIXABAN 2.5 MG/1
10 TABLET, FILM COATED ORAL
Refills: 0 | Status: DISCONTINUED | OUTPATIENT
Start: 2020-08-08 | End: 2020-08-13

## 2020-08-08 RX ORDER — NIFEDIPINE 30 MG
30 TABLET, EXTENDED RELEASE 24 HR ORAL DAILY
Refills: 0 | Status: DISCONTINUED | OUTPATIENT
Start: 2020-08-08 | End: 2020-08-13

## 2020-08-08 RX ORDER — DEXTROSE 50 % IN WATER 50 %
12.5 SYRINGE (ML) INTRAVENOUS ONCE
Refills: 0 | Status: DISCONTINUED | OUTPATIENT
Start: 2020-08-08 | End: 2020-08-13

## 2020-08-08 RX ORDER — GLUCAGON INJECTION, SOLUTION 0.5 MG/.1ML
1 INJECTION, SOLUTION SUBCUTANEOUS ONCE
Refills: 0 | Status: DISCONTINUED | OUTPATIENT
Start: 2020-08-08 | End: 2020-08-13

## 2020-08-08 RX ORDER — DEXTROSE 50 % IN WATER 50 %
15 SYRINGE (ML) INTRAVENOUS ONCE
Refills: 0 | Status: DISCONTINUED | OUTPATIENT
Start: 2020-08-08 | End: 2020-08-13

## 2020-08-08 RX ORDER — PREGABALIN 225 MG/1
1000 CAPSULE ORAL DAILY
Refills: 0 | Status: DISCONTINUED | OUTPATIENT
Start: 2020-08-09 | End: 2020-08-13

## 2020-08-08 RX ORDER — LEVOTHYROXINE SODIUM 125 MCG
125 TABLET ORAL DAILY
Refills: 0 | Status: DISCONTINUED | OUTPATIENT
Start: 2020-08-08 | End: 2020-08-13

## 2020-08-08 RX ORDER — PREGABALIN 225 MG/1
1000 CAPSULE ORAL DAILY
Refills: 0 | Status: DISCONTINUED | OUTPATIENT
Start: 2020-08-08 | End: 2020-08-08

## 2020-08-08 RX ORDER — NYSTATIN CREAM 100000 [USP'U]/G
1 CREAM TOPICAL
Refills: 0 | Status: DISCONTINUED | OUTPATIENT
Start: 2020-08-08 | End: 2020-08-13

## 2020-08-08 RX ORDER — ASPIRIN/CALCIUM CARB/MAGNESIUM 324 MG
81 TABLET ORAL DAILY
Refills: 0 | Status: DISCONTINUED | OUTPATIENT
Start: 2020-08-08 | End: 2020-08-13

## 2020-08-08 RX ORDER — NIFEDIPINE 30 MG
30 TABLET, EXTENDED RELEASE 24 HR ORAL DAILY
Refills: 0 | Status: DISCONTINUED | OUTPATIENT
Start: 2020-08-08 | End: 2020-08-08

## 2020-08-08 RX ADMIN — NYSTATIN CREAM 1 APPLICATION(S): 100000 CREAM TOPICAL at 18:23

## 2020-08-08 RX ADMIN — PREGABALIN 1000 MICROGRAM(S): 225 CAPSULE ORAL at 12:15

## 2020-08-08 RX ADMIN — APIXABAN 10 MILLIGRAM(S): 2.5 TABLET, FILM COATED ORAL at 18:23

## 2020-08-08 RX ADMIN — BUDESONIDE AND FORMOTEROL FUMARATE DIHYDRATE 2 PUFF(S): 160; 4.5 AEROSOL RESPIRATORY (INHALATION) at 12:17

## 2020-08-08 RX ADMIN — Medication 20 MILLIGRAM(S): at 18:22

## 2020-08-08 RX ADMIN — Medication 30 MILLIGRAM(S): at 12:15

## 2020-08-08 RX ADMIN — BUDESONIDE AND FORMOTEROL FUMARATE DIHYDRATE 2 PUFF(S): 160; 4.5 AEROSOL RESPIRATORY (INHALATION) at 21:54

## 2020-08-08 RX ADMIN — Medication 650 MILLIGRAM(S): at 04:29

## 2020-08-08 RX ADMIN — Medication 81 MILLIGRAM(S): at 12:15

## 2020-08-08 RX ADMIN — SIMVASTATIN 20 MILLIGRAM(S): 20 TABLET, FILM COATED ORAL at 21:54

## 2020-08-08 NOTE — ED ADULT TRIAGE NOTE - CHIEF COMPLAINT QUOTE
pt arrives w/ c/o not feeling her legs. pt states she was just discharged home from hospital and when she got home she felt tightness in her legs and very hard to ambulate. pt states she also has upper back pain and pain in fingers. pt appears uncomfortable. EKG in progress

## 2020-08-08 NOTE — PHYSICAL THERAPY INITIAL EVALUATION ADULT - IMPAIRED TRANSFERS: SIT/STAND, REHAB EVAL
impaired postural control/impaired balance/narrow base of support/decreased sensation/decreased strength

## 2020-08-08 NOTE — ED PROVIDER NOTE - CLINICAL SUMMARY MEDICAL DECISION MAKING FREE TEXT BOX
75F recently dc'd from hospital presents with similar sx. Will check electrolytes. Will also obtain additional imaging. US for DVTs, and CT lumbar spine for acute fractures (although pt has no back pain and spinal cord pathology is very low suspicion). Pt presented to ER similarly on Tuesday, likely this is her B12 deficiency. If able to ambulate may be able to DC as pt has outpatient PT.

## 2020-08-08 NOTE — ED PROVIDER NOTE - PHYSICAL EXAMINATION
General: Well developed, well nourished  HEENT: Normocephalic and atraumatic, Trachea midline.   Cardiac: Normal S1 and S2 w/ RRR. No MRG.  Pulmonary: CTA bilaterally. No increased WOB.   Abdominal: Soft, NTND  Neurologic: Strength 5/5 in bilateral LE. Sensation of LE intact. No focal sensory or motor deficits.  Musculoskeletal: No limited ROM.  Vascular: 1+ pitting edema to L LE, trace to R.   Skin: Color appropriate for race.   Psychiatric: Appropriate mood and affect. No apparent risk to self or others.  Ubaldo Orozco M.D. PGY-3

## 2020-08-08 NOTE — ED PROVIDER NOTE - ATTENDING CONTRIBUTION TO CARE
HPI: 75F PMH DM, HTN, recent admission for b12 deficiency with LE neurological symptoms and found to also have bilateral DVTs now started on Eliquis, was discharged earlier today, presents with worsening BL weakness and pain. Pt states she was dc'd from hospital today and was able to walk with her walker. States she was home for a very short time until she started to feel like she couldn't move or feel her legs. Denies any precipitating events. No falls. No back pain. No urinary incont, no bowel incont, no saddle anesthesia.  EXAM: anxious appearing, heart RRR, Lungs ctab, head atraumatic, eyes EOMI/PERRL, no facial droop,  strength in BUE 5/5, sensation intact BUE, pulses normal and equal. HPI: 75F PMH DM, HTN, recent admission for b12 deficiency with LE neurological symptoms and found to also have bilateral DVTs now started on Eliquis, was discharged earlier today, presents with worsening BL weakness and pain. Pt states she was dc'd from hospital today and was able to walk with her walker. States she was home for a very short time until she started to feel like she couldn't move or feel her legs. Denies any precipitating events. No falls. No back pain. No urinary incont, no bowel incont, no saddle anesthesia.  EXAM: anxious appearing, heart RRR, Lungs ctab, head atraumatic, eyes EOMI/PERRL, no facial droop,  strength in BUE 5/5, sensation intact BUE, pulses normal and equal. BLE without gross abnormalities, DP pulses palpable x 2, decreased sensation to light touch BLE with sensation intact to pain. Gait not tested secondary to weakness.   MDM: Pt with multiple medical problems that was recently discharged home after being found to have B12 def and DVTs that returns after being home and not able to ambulate due to leg weakness. Chart review shows pt only had proximal DVTs assessed on CTs prior to discharge, no official DVT studies of distal ext. Will need labs and imaging (US) to see extension of DVT which is most likely cause of pt condition vs B12 def but pt on supplements. Will most likely need admission as pt cannot walk.

## 2020-08-08 NOTE — PHYSICAL THERAPY INITIAL EVALUATION ADULT - PERTINENT HX OF CURRENT PROBLEM, REHAB EVAL
Pt. is a 75 year old female with PMH of Type 2 DM, HTN, asthma, hypothyroidism, vitiligo, CAD s/p MI, presents for inability to ambulate and weakness/loss of sensation to lower extremity

## 2020-08-08 NOTE — ED ADULT NURSE NOTE - INTERVENTIONS DEFINITIONS
[place] to call system/Non-slip footwear when patient is off stretcher/Provide visual clues: red socks

## 2020-08-08 NOTE — CONSULT NOTE ADULT - ASSESSMENT
75F with PMH of vitamin B12 deficiency s/p treatment, T2DM, HTN, asthma, hypothyroidism, vitiligo, DVT on Eliquis, CAD s/p MI, presents for inability to ambulate and weakness and sensory deficits in lower extremities x 3 weeks. Patient's Vitamin b12 level has improved from 150 to >2,000. CT lumbar spine shows multilevel lumbar spondylosis which is most pronounced at the L4-L5 level. On exam, patient has ***    Impression: Bilateral lower extremity weakness likely secondary to vitamin B12 deficiency, improving. Patient does not have signs or symptoms of cord compression, however she has multilevel spondylosis on imaging. Full recovery from vitamin b12 deficiency is gradual and expected in 6-12 months.    Recommendations:  -Continue vitamin b12 supplementation with 1,000U daily as prescribed   -Physical therapy as outpatient  -Follow up with primary care doctor after discharge     Case to be discussed with Dr. Gallagher, Neurology Attending.    Thank you.  Justina Miranda,   PGY-2  Neurology Resident 75F with PMH of vitamin B12 deficiency s/p treatment, T2DM, HTN, asthma, hypothyroidism, vitiligo, DVT on Eliquis, CAD s/p MI, presents for inability to ambulate and weakness and sensory deficits in lower extremities x 3 weeks. Patient's Vitamin b12 level has improved from 150 to >2,000. CT lumbar spine shows multilevel lumbar spondylosis which is most pronounced at the L4-L5 level. On exam, patient has signs of vitamin B12 deficiency, including abnormal sensation of lower extremities bilaterally with pinprick, light touch, vibration, and proprioception testing. Gait is unstable as patient reports she cannot feel the ground well.    Impression: Bilateral lower extremity weakness likely secondary to vitamin B12 deficiency, improving. Patient does not have signs or symptoms of cord compression, however she has multilevel spondylosis on imaging. Full recovery from vitamin B12 deficiency is gradual and expected in 6-12 months.    Recommendations:  -Continue vitamin B12 supplementation with 1,000U daily as prescribed   -Physical therapy as outpatient  -Follow up with primary care doctor after discharge     Case to be discussed with Dr. Gallagher, Neurology Attending.    Thank you.  Justina Miranda,   PGY-2  Neurology Resident 75F with PMH of vitamin B12 deficiency s/p treatment, T2DM, HTN, asthma, hypothyroidism, vitiligo, DVT on Eliquis, CAD s/p MI, presents for inability to ambulate and weakness and sensory deficits in lower extremities x 3 weeks. Patient's Vitamin b12 level has improved from 150 to >2,000. CT lumbar spine shows multilevel lumbar spondylosis which is most pronounced at the L4-L5 level. On exam, patient has signs of vitamin B12 deficiency, including abnormal sensation of lower extremities bilaterally with pinprick, light touch, vibration, and proprioception testing. Gait is unstable as patient reports she cannot feel the ground well, +Romberg sign.    Impression: Bilateral lower extremity weakness likely secondary to vitamin B12 deficiency, improving. Patient does not have signs or symptoms of cord compression, however she has multilevel spondylosis on imaging. Full recovery from vitamin B12 deficiency is gradual and expected in 6-12 months.    Recommendations:  -Continue vitamin B12 supplementation with 1,000U daily as prescribed   -Physical therapy as outpatient  -Follow up with primary care doctor after discharge     Case to be discussed with Dr. Gallagher, Neurology Attending.    Thank you.  Justina Miranda, DO  PGY-2  Neurology Resident 75F with PMH of vitamin B12 deficiency s/p treatment, T2DM, HTN, asthma, hypothyroidism, vitiligo, DVT on Eliquis, CAD s/p MI, presents for inability to ambulate and weakness and sensory deficits in lower extremities x 3 weeks. Patient's Vitamin b12 level has improved from 150 to >2,000. CT lumbar spine shows multilevel lumbar spondylosis which is most pronounced at the L4-L5 level. On exam, patient has signs of vitamin B12 deficiency, including abnormal sensation of lower extremities bilaterally with pinprick, light touch, vibration, and proprioception testing. Gait is unstable as patient reports she cannot feel the ground well, +Romberg sign.    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 Patient does not have signs or symptoms of cord compression, however she has multilevel spondylosis on imaging. Full recovery from vitamin B12 deficiency is gradual and expected in 6-12 months.    Recommendations:  -Continue vitamin B12 supplementation with 1,000U daily as prescribed   -Physical therapy as outpatient  -Follow up with primary care doctor after discharge     Case to be discussed with Dr. Gallagher, Neurology Attending.    Thank you.  Justina Miranda,   PGY-2  Neurology Resident

## 2020-08-08 NOTE — H&P ADULT - PROBLEM SELECTOR PLAN 1
-Persistent sensory loss and weakness of LE. Unable to ambulate. Intrinsic factor Ab indeterminant.  -F/u gastrin, MMA, anti-parietal cell Ab (sent last admit).  -F/u path report from EGD.  -Recheck B12 level.  -Continue w/ B12 1000mcg IM daily.   -PT evaluation.  -Consider neurology consult for additional input, if not improving.

## 2020-08-08 NOTE — ED ADULT NURSE NOTE - NSIMPLEMENTINTERV_GEN_ALL_ED
Implemented All Universal Safety Interventions:  [place] to call system. Call bell, personal items and telephone within reach. Instruct patient to call for assistance. Room bathroom lighting operational. Non-slip footwear when patient is off stretcher. Physically safe environment: no spills, clutter or unnecessary equipment. Stretcher in lowest position, wheels locked, appropriate side rails in place. Specific interventions were implemented:

## 2020-08-08 NOTE — ED PROVIDER NOTE - SECONDARY DIAGNOSIS.
SKYLAR (acute kidney injury) Acute deep vein thrombosis (DVT) of both lower extremities, unspecified vein

## 2020-08-08 NOTE — ED PROVIDER NOTE - CARE PLAN
Principal Discharge DX:	Weakness of both legs  Secondary Diagnosis:	SKYLAR (acute kidney injury)  Secondary Diagnosis:	Acute deep vein thrombosis (DVT) of both lower extremities, unspecified vein

## 2020-08-08 NOTE — H&P ADULT - PROBLEM SELECTOR PLAN 4
-FT4 was normal, TSH was low on last admission.  -Will repeat TSH.  -Adjust levothyroxine as appropriate- cont home dose for now.

## 2020-08-08 NOTE — H&P ADULT - NSHPPHYSICALEXAM_GEN_ALL_CORE
Vital Signs Last 24 Hrs  T(C): 36.7 (08 Aug 2020 09:11), Max: 36.8 (08 Aug 2020 01:19)  T(F): 98.1 (08 Aug 2020 09:11), Max: 98.3 (08 Aug 2020 01:19)  HR: 72 (08 Aug 2020 09:11) (72 - 94)  BP: 130/80 (08 Aug 2020 09:11) (97/54 - 133/62)  BP(mean): --  RR: 18 (08 Aug 2020 09:11) (17 - 18)  SpO2: 99% (08 Aug 2020 09:11) (96% - 100%)    Gen- In bed, comfortable, NAD  Eyes- EOMI, PERRLA, nonicteric.  EMNT- Fair dentition. MMM. No tonsilar exudates. No posterior pharynx erythema.  Neck- Supple. No masses. No tracheal deviation.  Resp- CTAB, good effort. No r/r/w. No accessory muscle use.  CVS- RRR, S1S2, no g/r/m. No LE edema.  GI- Soft abd, NT, ND, +BSx4. No HSM.  MSK- No C/C. ROM intact. No crepitus.  Neuro- CN II-XII intact. Speech fluent/face symmetric. Reduced/loss of propioception in the feet. Loss of sensation to touch at the feet up to the lower leg. Sensations are intact in the UE. Strength 4+ in the UE. Strength 3 in the LE. Unable to assess  due to arthritis.   Skin- Warm/moist. Areas on nose/chin/shins/dorsum of hands w/ loss of pigmentation. Chronic ulcers in the sacral area, no bleeding/discharge. Beefy red base w/ topical ointment around wound. Areas of dermatitis in the intertriginous area.  Psych- AAOx3. Appropriate mood/affect.

## 2020-08-08 NOTE — CONSULT NOTE ADULT - ATTENDING COMMENTS
patient was examined at bedside. strength atleast 4+/5 in LE, gives up easily on R leg. no sensory levels. DTRs are reduced in LE.  recommend physical therapy, B12 supplementation  please recall neurology as needed  I was physically present for the key portions of the evaluation and management service provided. I agree with the above history, physical and plan which I have reviewed and edited where appropriate.

## 2020-08-08 NOTE — PHYSICAL THERAPY INITIAL EVALUATION ADULT - IMPAIRMENTS CONTRIBUTING IMPAIRED BED MOBILITY, REHAB EVAL
decreased sensation/impaired balance/impaired postural control/narrow base of support/decreased strength

## 2020-08-08 NOTE — ED PROVIDER NOTE - OBJECTIVE STATEMENT
75F PMH DM, HTN, recent admission for b12 deficiency with LE neurological symptoms and found to also have bilateral DVTs now started on elaquis, presents with worsening BL weakness and pain. Pt states she was dc'd from hospital today and was able to walk with her walker. States she was home for a very short time until she started to feel like she couldn't move or feel her legs. Denies any precipitating events. No falls. No back pain. 75F PMH DM, HTN, recent admission for b12 deficiency with LE neurological symptoms and found to also have bilateral DVTs now started on Eliquis, presents with worsening BL weakness and pain. Pt states she was dc'd from hospital today and was able to walk with her walker. States she was home for a very short time until she started to feel like she couldn't move or feel her legs. Denies any precipitating events. No falls. No back pain. No urinary incont, no bowel incont, no saddle anesthesia.

## 2020-08-08 NOTE — ED PROVIDER NOTE - NS ED ROS FT
REVIEW OF SYSTEMS:  General: no fever, no chills  HEENT: no headache, no vision changes  Cardiac: no chest pain, no palpitations  Respiratory: no cough, no shortness of breath  Gastrointestinal: no abdominal pain, no nausea, no vomiting, no diarrhea  Genitourinary: no hematuria, no dysuria, no urinary frequency  Extremities: no extremity swelling, +extremity pain  Neuro: no focal weakness, +numbness/tingling of the extremities, no decreased sensation  Heme: no easy bleeding, no easy bruising  Skin: no jaundice,  no rashes, no lesions  All other ROS as documented in HPI  -Ubaldo Orozco, PGY-3

## 2020-08-08 NOTE — ED ADULT NURSE NOTE - OBJECTIVE STATEMENT
Patient is awake, A&O x 3, appears uncomfortable, presents to ED for bilateral leg weakness and pain.  Patient discharged from hospital yesterday and states when she returned home she was unable to walk with her walker.  She is being tx for DVT on Eliquis and B12 deficiency.  Denies chest pain or SOB.  22g IV left hand.  Connected to monitor @sinus rhythm, vitals taken and will continue to monitor patient. Patient is awake, A&O x 3, appears uncomfortable, presents to ED for bilateral leg weakness and pain.  Patient discharged from hospital yesterday and states when she returned home she was unable to walk with her walker.  She is being tx for DVT on Eliquis and B12 deficiency.  Denies chest pain or SOB.  22g IV left hand.  2 small wounds noted in sacral area, patient currently being tx by PCP for wounds.  Connected to monitor @sinus rhythm, vitals taken and will continue to monitor patient. Patient is awake, A&O x 3, appears uncomfortable, presents to ED for bilateral leg weakness and pain.  Patient discharged from hospital yesterday and states when she returned home she was unable to walk with her walker.  She is being tx for DVT on Eliquis and B12 deficiency.  Denies chest pain or SOB.  22g IV left hand.  Redness and skin irritation noted under bilateral breast, skin folds in abdomen and sacral area.  2 small wounds noted in sacral area, patient currently being tx by PCP for wounds.  Connected to monitor @sinus rhythm, vitals taken and will continue to monitor patient.

## 2020-08-08 NOTE — PHYSICAL THERAPY INITIAL EVALUATION ADULT - MANUAL MUSCLE TESTING RESULTS, REHAB EVAL
Bilateral UE muscle strength grossly 3/5 Throughout; Bilateral LE muscle strength grossly 3/5 Throughout however, bilateral ankle strength grossly 2/5.

## 2020-08-08 NOTE — ED PROVIDER NOTE - PMH
Asthma    Diabetes    DVT (deep venous thrombosis), right    Essential hypertension    Hypothyroid    Leg swelling    Ventral hernia

## 2020-08-08 NOTE — PHYSICAL THERAPY INITIAL EVALUATION ADULT - ADDITIONAL COMMENTS
Pt. reports she has absent light touch sensation throughout L4-s1 dermatomes throughout bilateral LE.     Pt. left supine in bed with all tubes/lines intact, call bell in reach and in NAD.

## 2020-08-08 NOTE — CONSULT NOTE ADULT - SUBJECTIVE AND OBJECTIVE BOX
HPI:  75F with PMH of vitamin B12 deficiency s/p treatment, T2DM, HTN, asthma, hypothyroidism, vitiligo, DVT on Eliquis, CAD s/p MI, presents for inability to ambulate and weakness and sensory deficits in lower extremities x 3 weeks. Of note, patient had a recent hospitalization 8/5-8/7 for bilateral LE cramping pain, numbness, and weakness x 3 weeks associated with 22lb weight loss in the last month. The numbness/heaviness started at the 2nd, 3rd, 4th digits of the right hand and eventually spread to her lower extremities. She was diagnosed with macrocytic anemia secondary to vitamin B12 deficiency and bilateral DVTs (started on Eliquis). Patient was started on vitamin B12 injections and underwent EGD which revealed atrophic gastric mucosa suggesting pernicious anemia. After returning home yesterday, patient continued to report difficulty ambulating and has persistent weakness and loss of sensation. Vitamin B12 level today is >2000.    Patient denies fevers, chills, SOB, chest pain, abdominal pain, nausea/vomiting, bowel or bladder incontinence, saddle anesthesia, headache, falls, dizziness, lower extremity cramping/calf pain. Patient is compliant with her home medications.    MEDICATIONS  (STANDING):  apixaban 10 milliGRAM(s) Oral two times a day  aspirin enteric coated 81 milliGRAM(s) Oral daily  budesonide 160 MICROgram(s)/formoterol 4.5 MICROgram(s) Inhaler 2 Puff(s) Inhalation two times a day  dextrose 5%. 1000 milliLiter(s) (50 mL/Hr) IV Continuous <Continuous>  dextrose 50% Injectable 12.5 Gram(s) IV Push once  dextrose 50% Injectable 25 Gram(s) IV Push once  dextrose 50% Injectable 25 Gram(s) IV Push once  enalapril 20 milliGRAM(s) Oral two times a day  insulin lispro (HumaLOG) corrective regimen sliding scale   SubCutaneous three times a day before meals  levothyroxine 125 MICROGram(s) Oral daily  NIFEdipine XL 30 milliGRAM(s) Oral daily  nystatin Powder 1 Application(s) Topical two times a day  simvastatin 20 milliGRAM(s) Oral at bedtime    MEDICATIONS  (PRN):  ALBUTerol    90 MICROgram(s) HFA Inhaler 2 Puff(s) Inhalation every 6 hours PRN Shortness of Breath and/or Wheezing  dextrose 40% Gel 15 Gram(s) Oral once PRN Blood Glucose LESS THAN 70 milliGRAM(s)/deciliter  glucagon  Injectable 1 milliGRAM(s) IntraMuscular once PRN Glucose LESS THAN 70 milligrams/deciliter    PAST MEDICAL & SURGICAL HISTORY:  Leg swelling  DVT (deep venous thrombosis), right  Hypothyroid  Ventral hernia  Diabetes  Asthma  Essential hypertension  No significant past surgical history    FAMILY HISTORY:  FH: diabetes mellitus    Allergies  Apple Juice (Rash)  Carrots (Rash)  No Known Drug Allergies  pork (Rash)    SHx - No smoking, No ETOH, No drug abuse    Review of Systems:  CONSTITUTIONAL:   HEENT:  No visual loss, blurred vision, double vision.  No hearing loss, sneezing, congestion, runny nose or sore throat.  SKIN:  No rash or itching.  CARDIOVASCULAR:  No chest pain, chest pressure or chest discomfort. No palpitations or edema.  RESPIRATORY:  No shortness of breath, cough or sputum.  GASTROINTESTINAL:  No anorexia, nausea, vomiting or diarrhea. No abdominal pain.  GENITOURINARY:  NO dysuria. No increased frequency. No retention. No incontinence.  NEUROLOGICAL:  See HPI  MUSCULOSKELETAL:  No muscle, back pain, joint pain or stiffness.  HEMATOLOGIC:  No anemia, bleeding or bruising.  ENDOCRINOLOGIC:  No reports of sweating, cold or heat intolerance. No polyuria or polydipsia.        Vital Signs Last 24 Hrs  T(C): 36.6 (08 Aug 2020 15:00), Max: 36.8 (08 Aug 2020 01:19)  T(F): 97.8 (08 Aug 2020 15:00), Max: 98.3 (08 Aug 2020 01:19)  HR: 75 (08 Aug 2020 15:00) (72 - 84)  BP: 107/53 (08 Aug 2020 15:00) (97/54 - 133/62)  BP(mean): --  RR: 18 (08 Aug 2020 15:00) (17 - 18)  SpO2: 100% (08 Aug 2020 15:00) (96% - 100%)    PHYSICAL EXAM:  GENERAL: NAD  HEENT: Normocephalic;  conjunctivae and sclerae clear; moist mucous membranes;   NECK : supple  CHEST/LUNG: Clear to auscultation bilaterally with good air entry   HEART: S1 S2  regular; no murmurs, gallops or rubs  ABDOMEN: Soft, Nontender, Nondistended; Bowel sounds present  EXTREMITIES: no cyanosis; no edema; no calf tenderness  SKIN: warm and dry; no rash             Neurological Exam:  - Mental Status:  AAOx3; speech is fluent with intact naming, repetition, and comprehension  - Cranial Nerves II-XII:    II:  PERRLA; visual fields are full to confrontation  III, IV, VI:  EOMI, no nystagmus  V:  facial sensation is intact in the V1-V3 distribution bilaterally.  VII:  face is symmetric with normal eye closure and smile  VIII:  hearing is intact to finger rub  IX, X:  uvula is midline and soft palate rises symmetrically  XI:  head turning and shoulder shrug are intact bilaterally  XII:  tongue protrudes in the midline  - Motor:   ***  - Reflexes:  2+ and symmetric at the biceps, triceps, brachioradialis, knees, and ankles;  plantar reflexes downgoing bilaterally  - Sensory:  ***  - Coordination:  finger-nose-finger and heel-knee-shin intact without dysmetria; rapid alternating hand movements intact  - Gait:  normal steps, base, arm swing, and turning; heel and toe walking are normal; tandem gait is normal; Romberg testing is negative    Other:    08-08    139  |  102  |  18  ----------------------------<  96  3.8   |  24  |  1.16    Ca    10.5      08 Aug 2020 11:20  Phos  2.5     08-07  Mg     2.1     08-07    TPro  7.1  /  Alb  3.7  /  TBili  0.6  /  DBili  x   /  AST  16  /  ALT  8   /  AlkPhos  79  08-08                          7.8    7.12  )-----------( 192      ( 08 Aug 2020 02:26 )             22.0     Vitamin B12, Serum: > 2000 pg/mL (08.08.20 @ 11:20)    Radiology  < from: CT Lumbar Spine No Cont (08.08.20 @ 04:12) >  IMPRESSION:    Multilevel lumbar spondylosis which is most pronounced at the L4-L5 level where there is moderate central canal stenosis and L3-L4 and L5-S1 levels where there is mild central canal stenosis.    < end of copied text > HPI:  74 y/o French-speaking female with PMH of vitamin B12 deficiency s/p treatment, T2DM, HTN, asthma, hypothyroidism, vitiligo, DVT on Eliquis, CAD s/p MI, presents for inability to ambulate and weakness and sensory deficits in lower extremities x 3 weeks. Of note, patient had a recent hospitalization 8/5-8/7 for bilateral LE cramping pain, numbness, and weakness x 3 weeks associated with 22lb weight loss in the last month. The numbness/heaviness started at the 2nd, 3rd, 4th digits of the right hand and eventually spread to her lower extremities. She was diagnosed with macrocytic anemia secondary to vitamin B12 deficiency and bilateral DVTs (started on Eliquis). Patient was started on vitamin B12 injections and underwent EGD which revealed atrophic gastric mucosa suggesting pernicious anemia. After returning home yesterday, patient continued to report difficulty ambulating and has persistent loss of sensation. Patient says she is nervous to walk because she is unsteady on her feet and has numbness/tingling. She also has tingling of her 2nd and 3rd digits of her right hand and feet bilaterally. Vitamin B12 level today is >2000.    Patient denies back pain, fevers, chills, SOB, chest pain, abdominal pain, nausea/vomiting, bowel or bladder incontinence, saddle anesthesia, headache, falls, dizziness, lower extremity cramping/calf pain. Patient is compliant with her home medications.    MEDICATIONS  (STANDING):  apixaban 10 milliGRAM(s) Oral two times a day  aspirin enteric coated 81 milliGRAM(s) Oral daily  budesonide 160 MICROgram(s)/formoterol 4.5 MICROgram(s) Inhaler 2 Puff(s) Inhalation two times a day  dextrose 5%. 1000 milliLiter(s) (50 mL/Hr) IV Continuous <Continuous>  dextrose 50% Injectable 12.5 Gram(s) IV Push once  dextrose 50% Injectable 25 Gram(s) IV Push once  dextrose 50% Injectable 25 Gram(s) IV Push once  enalapril 20 milliGRAM(s) Oral two times a day  insulin lispro (HumaLOG) corrective regimen sliding scale   SubCutaneous three times a day before meals  levothyroxine 125 MICROGram(s) Oral daily  NIFEdipine XL 30 milliGRAM(s) Oral daily  nystatin Powder 1 Application(s) Topical two times a day  simvastatin 20 milliGRAM(s) Oral at bedtime    MEDICATIONS  (PRN):  ALBUTerol    90 MICROgram(s) HFA Inhaler 2 Puff(s) Inhalation every 6 hours PRN Shortness of Breath and/or Wheezing  dextrose 40% Gel 15 Gram(s) Oral once PRN Blood Glucose LESS THAN 70 milliGRAM(s)/deciliter  glucagon  Injectable 1 milliGRAM(s) IntraMuscular once PRN Glucose LESS THAN 70 milligrams/deciliter    PAST MEDICAL & SURGICAL HISTORY:  Leg swelling  DVT (deep venous thrombosis), right  Hypothyroid  Ventral hernia  Diabetes  Asthma  Essential hypertension    FAMILY HISTORY:  FH: diabetes mellitus    Allergies  Apple Juice (Rash)  Carrots (Rash)  No Known Drug Allergies  pork (Rash)    SHx - No smoking, No ETOH, No drug abuse    Review of Systems:  CONSTITUTIONAL:   HEENT:  No visual loss, blurred vision, double vision.  No hearing loss, sneezing, congestion, runny nose or sore throat.  SKIN:  No rash or itching.  CARDIOVASCULAR:  No chest pain, chest pressure or chest discomfort. No palpitations or edema.  RESPIRATORY:  No shortness of breath, cough or sputum.  GASTROINTESTINAL:  No anorexia, nausea, vomiting or diarrhea. No abdominal pain.  GENITOURINARY:  No dysuria. No increased frequency. No retention. No incontinence.  NEUROLOGICAL:  See HPI  MUSCULOSKELETAL:  No muscle, back pain, joint pain or stiffness.    Vital Signs Last 24 Hrs  T(C): 36.6 (08 Aug 2020 15:00), Max: 36.8 (08 Aug 2020 01:19)  T(F): 97.8 (08 Aug 2020 15:00), Max: 98.3 (08 Aug 2020 01:19)  HR: 75 (08 Aug 2020 15:00) (72 - 84)  BP: 107/53 (08 Aug 2020 15:00) (97/54 - 133/62)  BP(mean): --  RR: 18 (08 Aug 2020 15:00) (17 - 18)  SpO2: 100% (08 Aug 2020 15:00) (96% - 100%)    PHYSICAL EXAM:  GENERAL: NAD, vitiligo of face, feet, and hands  HEENT: Normocephalic; conjunctivae and sclerae clear; moist mucous membranes;   NECK : supple  ABDOMEN: Soft, non-tender, non-distended  EXTREMITIES: no cyanosis; no edema; no calf tenderness; no lower extremity edema  SKIN: warm and dry; no rash             Neurological Exam:  - Mental Status: Alert and oriented to person, place, year, president; speech is fluent with intact naming, repetition, and comprehension  - Cranial Nerves II-XII:    II:  PERRLA; visual fields are full to confrontation  III, IV, VI:  EOMI, no nystagmus  V:  facial sensation is intact in the V1-V3 distribution bilaterally.  VII:  face is symmetric with normal eye closure and smile  VIII:  hearing is intact to finger rub  IX, X:  uvula is midline and soft palate rises symmetrically  XI:  head turning and shoulder shrug are intact bilaterally  XII:  tongue protrudes in the midline  - Motor: 3/5 RLE and 3/5 LLE (may be limited by hip pain), 5/5 for upper extremities bilaterally  - Reflexes:  2+ and symmetric at the biceps, triceps, brachioradialis, knees, and ankles;  plantar reflexes downgoing bilaterally  - Sensory: states she feels "cold" with light touch for both lower extremities from ankle to knee; sensation diminished bilaterally with pinprick up to thigh; proprioception and vibration of big toe not intact. Light touch, pinprick, vibration, and proprioception intact and symmetric for upper extremities bilaterally  - Coordination:  finger-nose-finger without dysmetria  - Gait: unsteady with 2 person assistance; Romberg testing is positive    Other:    08-08    139  |  102  |  18  ----------------------------<  96  3.8   |  24  |  1.16    Ca    10.5      08 Aug 2020 11:20  Phos  2.5     08-07  Mg     2.1     08-07    TPro  7.1  /  Alb  3.7  /  TBili  0.6  /  DBili  x   /  AST  16  /  ALT  8   /  AlkPhos  79  08-08                          7.8    7.12  )-----------( 192      ( 08 Aug 2020 02:26 )             22.0     Vitamin B12, Serum: > 2000 pg/mL (08.08.20 @ 11:20)    Radiology  < from: CT Lumbar Spine No Cont (08.08.20 @ 04:12) >  IMPRESSION:    Multilevel lumbar spondylosis which is most pronounced at the L4-L5 level where there is moderate central canal stenosis and L3-L4 and L5-S1 levels where there is mild central canal stenosis.    < end of copied text >

## 2020-08-08 NOTE — ED PROVIDER NOTE - PROGRESS NOTE DETAILS
Pt was found to have extensive DVTs in BLE. This was partially known from prior CT findings on Aug 5th and pt was started on PO anticoagulation but returns for weakeness and also found on labs to have SKYLAR. Will need to re-admit pt to hospital for continued therapy until at least some resolution or improvement in her gait and ability to walk. Pt is amenable to plan for admission and further treatment.

## 2020-08-08 NOTE — H&P ADULT - PROBLEM SELECTOR PLAN 5
-Taken off metformin due to B12 def.  -FS well controlled by diet.  -Carb controlled diet.  -Correctional insulin ordered.

## 2020-08-08 NOTE — PHYSICAL THERAPY INITIAL EVALUATION ADULT - DIAGNOSIS, PT EVAL
Deconditioning, decreased strength, decreased balance, decreased endurance, decreased sensation, decreased postural control all limiting pts. ability to perform functional mobility

## 2020-08-08 NOTE — PHYSICAL THERAPY INITIAL EVALUATION ADULT - IMPAIRMENTS CONTRIBUTING TO GAIT DEVIATIONS, PT EVAL
decreased strength/impaired postural control/impaired balance/narrow base of support/decreased sensation

## 2020-08-08 NOTE — H&P ADULT - ASSESSMENT
75F with PMH of T2DM, HTN, asthma, hypothyroidism, vitiligo, CAD s/p MI, recent dx of BLE DVT on Eliquis, and B12 deficiency c/b LE weakness who presents to the hospital for inability to ambulate and persistent loss of sensation/weakness to the lower extremities. Initial labs notable for slight increase of Cr. Hb remains low w/ macrocytosis noted. CT of L spine reviewed. It revealed multilevel lumbar spondylosis. There is central canal stenosis noted at multiple levels, most prominent at the L4-5 region. Venous duplex of LE confirmed b/l DVTs. The patient is now admitted due to persistent neurologic deficits (sensory loss/inability to ambulate) likely related to B12 deficiency. 75F with PMH of T2DM, HTN, asthma, hypothyroidism, vitiligo, CAD s/p MI, recent dx of BLE DVT on Eliquis, and B12 deficiency c/b LE weakness who presents to the hospital for inability to ambulate and persistent loss of sensation/weakness to the lower extremities. Initial labs notable for slight increase of Cr. Hb remains low w/ macrocytosis noted. CT of L spine reviewed. It revealed multilevel lumbar spondylosis. There is central canal stenosis noted at multiple levels, most prominent at the L4-5 region. Venous duplex of LE confirmed b/l DVTs. EKG was reviewed - SR 83. No acute ST/T changes. The patient is now admitted due to persistent neurologic deficits (sensory loss/inability to ambulate) likely related to B12 deficiency.

## 2020-08-09 LAB
ANION GAP SERPL CALC-SCNC: 14 MMO/L — SIGNIFICANT CHANGE UP (ref 7–14)
BUN SERPL-MCNC: 16 MG/DL — SIGNIFICANT CHANGE UP (ref 7–23)
CALCIUM SERPL-MCNC: 10.3 MG/DL — SIGNIFICANT CHANGE UP (ref 8.4–10.5)
CHLORIDE SERPL-SCNC: 101 MMOL/L — SIGNIFICANT CHANGE UP (ref 98–107)
CO2 SERPL-SCNC: 23 MMOL/L — SIGNIFICANT CHANGE UP (ref 22–31)
CREAT SERPL-MCNC: 1 MG/DL — SIGNIFICANT CHANGE UP (ref 0.5–1.3)
GLUCOSE BLDC GLUCOMTR-MCNC: 100 MG/DL — HIGH (ref 70–99)
GLUCOSE BLDC GLUCOMTR-MCNC: 113 MG/DL — HIGH (ref 70–99)
GLUCOSE BLDC GLUCOMTR-MCNC: 122 MG/DL — HIGH (ref 70–99)
GLUCOSE BLDC GLUCOMTR-MCNC: 128 MG/DL — HIGH (ref 70–99)
GLUCOSE SERPL-MCNC: 86 MG/DL — SIGNIFICANT CHANGE UP (ref 70–99)
HCT VFR BLD CALC: 23.4 % — LOW (ref 34.5–45)
HGB BLD-MCNC: 7.7 G/DL — LOW (ref 11.5–15.5)
MAGNESIUM SERPL-MCNC: 2.1 MG/DL — SIGNIFICANT CHANGE UP (ref 1.6–2.6)
MCHC RBC-ENTMCNC: 32.9 % — SIGNIFICANT CHANGE UP (ref 32–36)
MCHC RBC-ENTMCNC: 41.4 PG — HIGH (ref 27–34)
MCV RBC AUTO: 125.8 FL — HIGH (ref 80–100)
METHYLMALONATE SERPL-SCNC: HIGH NMOL/L (ref 0–378)
NRBC # FLD: 0.04 K/UL — SIGNIFICANT CHANGE UP (ref 0–0)
PLATELET # BLD AUTO: 196 K/UL — SIGNIFICANT CHANGE UP (ref 150–400)
PMV BLD: 11.3 FL — SIGNIFICANT CHANGE UP (ref 7–13)
POTASSIUM SERPL-MCNC: 4.6 MMOL/L — SIGNIFICANT CHANGE UP (ref 3.5–5.3)
POTASSIUM SERPL-SCNC: 4.6 MMOL/L — SIGNIFICANT CHANGE UP (ref 3.5–5.3)
RBC # BLD: 1.86 M/UL — LOW (ref 3.8–5.2)
RBC # FLD: 14.4 % — SIGNIFICANT CHANGE UP (ref 10.3–14.5)
SODIUM SERPL-SCNC: 138 MMOL/L — SIGNIFICANT CHANGE UP (ref 135–145)
TSH SERPL-MCNC: 1 UIU/ML — SIGNIFICANT CHANGE UP (ref 0.27–4.2)
WBC # BLD: 6.29 K/UL — SIGNIFICANT CHANGE UP (ref 3.8–10.5)
WBC # FLD AUTO: 6.29 K/UL — SIGNIFICANT CHANGE UP (ref 3.8–10.5)

## 2020-08-09 PROCEDURE — 99222 1ST HOSP IP/OBS MODERATE 55: CPT

## 2020-08-09 PROCEDURE — 99233 SBSQ HOSP IP/OBS HIGH 50: CPT

## 2020-08-09 RX ORDER — ACETAMINOPHEN 500 MG
650 TABLET ORAL EVERY 6 HOURS
Refills: 0 | Status: DISCONTINUED | OUTPATIENT
Start: 2020-08-09 | End: 2020-08-13

## 2020-08-09 RX ADMIN — BUDESONIDE AND FORMOTEROL FUMARATE DIHYDRATE 2 PUFF(S): 160; 4.5 AEROSOL RESPIRATORY (INHALATION) at 21:20

## 2020-08-09 RX ADMIN — APIXABAN 10 MILLIGRAM(S): 2.5 TABLET, FILM COATED ORAL at 05:34

## 2020-08-09 RX ADMIN — BUDESONIDE AND FORMOTEROL FUMARATE DIHYDRATE 2 PUFF(S): 160; 4.5 AEROSOL RESPIRATORY (INHALATION) at 10:00

## 2020-08-09 RX ADMIN — Medication 125 MICROGRAM(S): at 05:34

## 2020-08-09 RX ADMIN — PREGABALIN 1000 MICROGRAM(S): 225 CAPSULE ORAL at 13:42

## 2020-08-09 RX ADMIN — Medication 650 MILLIGRAM(S): at 21:20

## 2020-08-09 RX ADMIN — Medication 30 MILLIGRAM(S): at 05:34

## 2020-08-09 RX ADMIN — Medication 20 MILLIGRAM(S): at 17:50

## 2020-08-09 RX ADMIN — APIXABAN 10 MILLIGRAM(S): 2.5 TABLET, FILM COATED ORAL at 17:50

## 2020-08-09 RX ADMIN — NYSTATIN CREAM 1 APPLICATION(S): 100000 CREAM TOPICAL at 05:32

## 2020-08-09 RX ADMIN — SIMVASTATIN 20 MILLIGRAM(S): 20 TABLET, FILM COATED ORAL at 21:20

## 2020-08-09 RX ADMIN — Medication 20 MILLIGRAM(S): at 05:34

## 2020-08-09 RX ADMIN — Medication 81 MILLIGRAM(S): at 13:42

## 2020-08-09 RX ADMIN — NYSTATIN CREAM 1 APPLICATION(S): 100000 CREAM TOPICAL at 17:51

## 2020-08-09 RX ADMIN — Medication 650 MILLIGRAM(S): at 21:50

## 2020-08-09 NOTE — DIETITIAN INITIAL EVALUATION ADULT. - PERTINENT MEDS FT
MEDICATIONS  (STANDING):  apixaban 10 milliGRAM(s) Oral two times a day  aspirin enteric coated 81 milliGRAM(s) Oral daily  budesonide 160 MICROgram(s)/formoterol 4.5 MICROgram(s) Inhaler 2 Puff(s) Inhalation two times a day  cyanocobalamin Injectable 1000 MICROGram(s) IntraMuscular daily  dextrose 5%. 1000 milliLiter(s) (50 mL/Hr) IV Continuous <Continuous>  dextrose 50% Injectable 12.5 Gram(s) IV Push once  dextrose 50% Injectable 25 Gram(s) IV Push once  dextrose 50% Injectable 25 Gram(s) IV Push once  enalapril 20 milliGRAM(s) Oral two times a day  insulin lispro (HumaLOG) corrective regimen sliding scale   SubCutaneous three times a day before meals  levothyroxine 125 MICROGram(s) Oral daily  NIFEdipine XL 30 milliGRAM(s) Oral daily  nystatin Powder 1 Application(s) Topical two times a day  simvastatin 20 milliGRAM(s) Oral at bedtime    MEDICATIONS  (PRN):  ALBUTerol    90 MICROgram(s) HFA Inhaler 2 Puff(s) Inhalation every 6 hours PRN Shortness of Breath and/or Wheezing  dextrose 40% Gel 15 Gram(s) Oral once PRN Blood Glucose LESS THAN 70 milliGRAM(s)/deciliter  glucagon  Injectable 1 milliGRAM(s) IntraMuscular once PRN Glucose LESS THAN 70 milligrams/deciliter

## 2020-08-09 NOTE — DIETITIAN INITIAL EVALUATION ADULT. - OTHER INFO
Patient is a 75F with PMH of T2DM, HTN, asthma, hypothyroidism, vitiligo, CAD s/p MI, recent dx of BLE DVT on Eliquis, and B12 deficiency c/b LE weakness who presents to the hospital for inability to ambulate and persistent loss of sensation/weakness to the lower extremities. Initial labs notable for slight increase of Cr. Hb remains low w/ macrocytosis noted. CT of L spine reviewed. It revealed multilevel lumbar spondylosis. There is central canal stenosis noted at multiple levels, most prominent at the L4-5 region. Venous duplex of LE confirmed b/l DVTs. EKG was reviewed - SR 83. No acute ST/T changes. The patient is now admitted due to persistent neurologic deficits (sensory loss/inability to ambulate) likely related to B12 deficiency.    Previous RD assessment on 8/5/20 from recent hospitalization reviewed. RD met with patient today. Per patient, appetite and po intake has been poor prior to admission with ~23# of weight loss over the past ~1.5 month from #. Patient reports she typically has 3 small meals a day, likes to have vegetable soups, chicken, and fish daily at home. Patient previously reports she "does not like beef or any other kind of meats", but today she reports she is ok with beef and does not want it to be removed from her meal. Food allergies noted and is confirmed with patient. Patient denies chewing/swallowing difficulties at meals, also denies GI distress (nausea/vomiting/diarrhea/constipation) at this time. Noted HgbA1C 5.4% on 8/4/20. Patient may benefit from liberalize diet and add Glucerna Shake supplement to optimize po intake and promote wound healing.

## 2020-08-09 NOTE — DIETITIAN INITIAL EVALUATION ADULT. - PHYSICAL APPEARANCE
other (specify) Nutrition focused physical exam conducted - found signs of malnutrition [ ]absent [X]present   Subcutaneous fat loss: [mod-severe] Orbital fat pads region, [moderate]Buccal fat region, [severe]Triceps region,  Muscle wasting: [moderate]Temples region, [severe]Clavicle region, [mod-severe]Interosseous region,  [ moderate]thigh region, [moderate]Calf region

## 2020-08-09 NOTE — DIETITIAN INITIAL EVALUATION ADULT. - PERTINENT LABORATORY DATA
08-09 Na138 mmol/L Glu 86 mg/dL K+ 4.6 mmol/L Cr  1.00 mg/dL BUN 16 mg/dL 08-07 Phos 2.5 mg/dL 08-08 Alb 3.7 g/dL

## 2020-08-09 NOTE — PROGRESS NOTE ADULT - PROBLEM SELECTOR PLAN 1
-Persistent sensory loss and weakness of LE. Unable to ambulate. Intrinsic factor Ab indeterminant.  -F/u gastrin, MMA, anti-parietal cell Ab (sent last admit).  -F/u path report from EGD.  -Recheck B12 level.  -Continue w/ B12 1000mcg IM daily.   -PT evaluation.  - Neurology consulted. appreciate recs

## 2020-08-09 NOTE — DIETITIAN INITIAL EVALUATION ADULT. - ADD RECOMMEND
1. Add Glucerna Therapeutic Nutrition Shake 240mls 3x daily (660kcals, 30g protein). 2. Consider add Multivitamin with minerals for micronutrient coverage. c/w B12 supplement as clinically indicated.  3. Monitor weights, labs, BM's, skin integrity, PO intake/tolerance. 4. Encourage po intake, assist with meals and menu selections, provide alternatives PRN.  -- RD discussed case with PA.

## 2020-08-09 NOTE — PROGRESS NOTE ADULT - SUBJECTIVE AND OBJECTIVE BOX
Patient is a 75y old  Female who presents with a chief complaint of Weakness, inability to ambulate (08 Aug 2020 15:36)      SUBJECTIVE / OVERNIGHT EVENTS:  No overnight events. Pt still complaining of LE weakness, denies any pain. Seen by PT and is motivated    ADDITIONAL REVIEW OF SYSTEMS:  No chest pain, no palpitations, no shortness of breath, no cough    MEDICATIONS  (STANDING):  apixaban 10 milliGRAM(s) Oral two times a day  aspirin enteric coated 81 milliGRAM(s) Oral daily  budesonide 160 MICROgram(s)/formoterol 4.5 MICROgram(s) Inhaler 2 Puff(s) Inhalation two times a day  cyanocobalamin Injectable 1000 MICROGram(s) IntraMuscular daily  dextrose 5%. 1000 milliLiter(s) (50 mL/Hr) IV Continuous <Continuous>  dextrose 50% Injectable 12.5 Gram(s) IV Push once  dextrose 50% Injectable 25 Gram(s) IV Push once  dextrose 50% Injectable 25 Gram(s) IV Push once  enalapril 20 milliGRAM(s) Oral two times a day  insulin lispro (HumaLOG) corrective regimen sliding scale   SubCutaneous three times a day before meals  levothyroxine 125 MICROGram(s) Oral daily  NIFEdipine XL 30 milliGRAM(s) Oral daily  nystatin Powder 1 Application(s) Topical two times a day  simvastatin 20 milliGRAM(s) Oral at bedtime    MEDICATIONS  (PRN):  ALBUTerol    90 MICROgram(s) HFA Inhaler 2 Puff(s) Inhalation every 6 hours PRN Shortness of Breath and/or Wheezing  dextrose 40% Gel 15 Gram(s) Oral once PRN Blood Glucose LESS THAN 70 milliGRAM(s)/deciliter  glucagon  Injectable 1 milliGRAM(s) IntraMuscular once PRN Glucose LESS THAN 70 milligrams/deciliter      CAPILLARY BLOOD GLUCOSE      POCT Blood Glucose.: 122 mg/dL (09 Aug 2020 12:12)  POCT Blood Glucose.: 100 mg/dL (09 Aug 2020 08:27)  POCT Blood Glucose.: 105 mg/dL (08 Aug 2020 22:16)  POCT Blood Glucose.: 110 mg/dL (08 Aug 2020 17:03)    I&O's Summary      PHYSICAL EXAM:  Vital Signs Last 24 Hrs  T(C): 36.7 (09 Aug 2020 14:14), Max: 36.9 (08 Aug 2020 21:52)  T(F): 98.1 (09 Aug 2020 14:14), Max: 98.4 (08 Aug 2020 21:52)  HR: 70 (09 Aug 2020 14:14) (68 - 77)  BP: 108/57 (09 Aug 2020 14:14) (108/57 - 136/69)  BP(mean): --  RR: 18 (09 Aug 2020 14:14) (16 - 18)  SpO2: 100% (09 Aug 2020 14:14) (100% - 100%)  CONSTITUTIONAL: NAD, well-developed, well-groomed  EYES: PERRLA; conjunctiva and sclera clear  ENMT: Moist oral mucosa, no pharyngeal injection or exudates; normal dentition  NECK: Supple, no palpable masses; no thyromegaly  RESPIRATORY: Normal respiratory effort; lungs are clear to auscultation bilaterally  CARDIOVASCULAR: Regular rate and rhythm, normal S1 and S2, no murmur/rub/gallop; No lower extremity edema; Peripheral pulses are 2+ bilaterally  ABDOMEN: Nontender to palpation, normoactive bowel sounds, no rebound/guarding; No hepatosplenomegaly  MUSCULOSKELETAL:  Normal gait; no clubbing or cyanosis of digits; no joint swelling or tenderness to palpation  PSYCH: A+O to person, place, and time; affect appropriate  NEUROLOGY: CN 2-12 are intact and symmetric; no gross sensory deficits   SKIN: No rashes; no palpable lesions    LABS:                        7.7    6.29  )-----------( 196      ( 09 Aug 2020 09:43 )             23.4     08-09    138  |  101  |  16  ----------------------------<  86  4.6   |  23  |  1.00    Ca    10.3      09 Aug 2020 06:21  Mg     2.1     08-09    TPro  7.1  /  Alb  3.7  /  TBili  0.6  /  DBili  x   /  AST  16  /  ALT  8   /  AlkPhos  79  08-08                RADIOLOGY & ADDITIONAL TESTS:  Results Reviewed:   Imaging Personally Reviewed:  Electrocardiogram Personally Reviewed:    COORDINATION OF CARE:  Care Discussed with Consultants/Other Providers [Y/N]:  Prior or Outpatient Records Reviewed [Y/N]: [id number] Exp Problem Focused - Low Complex

## 2020-08-09 NOTE — PROGRESS NOTE ADULT - ASSESSMENT
75F with PMH of T2DM, HTN, asthma, hypothyroidism, vitiligo, CAD s/p MI, recent dx of BLE DVT on Eliquis, and B12 deficiency c/b LE weakness who presents to the hospital for inability to ambulate and persistent loss of sensation/weakness to the lower extremities. Initial labs notable for slight increase of Cr. Hb remains low w/ macrocytosis noted. CT of L spine reviewed. It revealed multilevel lumbar spondylosis. There is central canal stenosis noted at multiple levels, most prominent at the L4-5 region. Venous duplex of LE confirmed b/l DVTs. EKG was reviewed - SR 83. No acute ST/T changes. The patient is now admitted due to persistent neurologic deficits (sensory loss/inability to ambulate) likely related to B12 deficiency.

## 2020-08-09 NOTE — DIETITIAN INITIAL EVALUATION ADULT. - ENERGY NEEDS
Ht: 157.48 cm. Wt: 73.5 kg (8/8, stated). BMI 29.6kg/m^2.  # +/-10%.  Skin: stage II pressure injuries to sacrum and R buttocks.   Edema: L+ R legs 1+ edema noted per flowsheet.

## 2020-08-10 ENCOUNTER — TRANSCRIPTION ENCOUNTER (OUTPATIENT)
Age: 75
End: 2020-08-10

## 2020-08-10 LAB
ANION GAP SERPL CALC-SCNC: 10 MMO/L — SIGNIFICANT CHANGE UP (ref 7–14)
BUN SERPL-MCNC: 21 MG/DL — SIGNIFICANT CHANGE UP (ref 7–23)
CALCIUM SERPL-MCNC: 10.3 MG/DL — SIGNIFICANT CHANGE UP (ref 8.4–10.5)
CHLORIDE SERPL-SCNC: 102 MMOL/L — SIGNIFICANT CHANGE UP (ref 98–107)
CO2 SERPL-SCNC: 27 MMOL/L — SIGNIFICANT CHANGE UP (ref 22–31)
CREAT SERPL-MCNC: 1.04 MG/DL — SIGNIFICANT CHANGE UP (ref 0.5–1.3)
GLUCOSE BLDC GLUCOMTR-MCNC: 108 MG/DL — HIGH (ref 70–99)
GLUCOSE BLDC GLUCOMTR-MCNC: 109 MG/DL — HIGH (ref 70–99)
GLUCOSE BLDC GLUCOMTR-MCNC: 111 MG/DL — HIGH (ref 70–99)
GLUCOSE BLDC GLUCOMTR-MCNC: 133 MG/DL — HIGH (ref 70–99)
GLUCOSE SERPL-MCNC: 96 MG/DL — SIGNIFICANT CHANGE UP (ref 70–99)
HCT VFR BLD CALC: 24.9 % — LOW (ref 34.5–45)
HGB BLD-MCNC: 8.4 G/DL — LOW (ref 11.5–15.5)
MAGNESIUM SERPL-MCNC: 2.1 MG/DL — SIGNIFICANT CHANGE UP (ref 1.6–2.6)
MCHC RBC-ENTMCNC: 33.7 % — SIGNIFICANT CHANGE UP (ref 32–36)
MCHC RBC-ENTMCNC: 43.8 PG — HIGH (ref 27–34)
MCV RBC AUTO: 129.7 FL — HIGH (ref 80–100)
NRBC # FLD: 0.02 K/UL — SIGNIFICANT CHANGE UP (ref 0–0)
PCA AB SER-ACNC: HIGH
PLATELET # BLD AUTO: 236 K/UL — SIGNIFICANT CHANGE UP (ref 150–400)
PMV BLD: 12 FL — SIGNIFICANT CHANGE UP (ref 7–13)
POTASSIUM SERPL-MCNC: 4.1 MMOL/L — SIGNIFICANT CHANGE UP (ref 3.5–5.3)
POTASSIUM SERPL-SCNC: 4.1 MMOL/L — SIGNIFICANT CHANGE UP (ref 3.5–5.3)
RBC # BLD: 1.92 M/UL — LOW (ref 3.8–5.2)
RBC # FLD: 14.6 % — HIGH (ref 10.3–14.5)
SODIUM SERPL-SCNC: 139 MMOL/L — SIGNIFICANT CHANGE UP (ref 135–145)
WBC # BLD: 6.14 K/UL — SIGNIFICANT CHANGE UP (ref 3.8–10.5)
WBC # FLD AUTO: 6.14 K/UL — SIGNIFICANT CHANGE UP (ref 3.8–10.5)

## 2020-08-10 PROCEDURE — 99233 SBSQ HOSP IP/OBS HIGH 50: CPT

## 2020-08-10 RX ORDER — POLYETHYLENE GLYCOL 3350 17 G/17G
POWDER, FOR SOLUTION ORAL
Refills: 0 | Status: DISCONTINUED | OUTPATIENT
Start: 2020-08-10 | End: 2020-08-13

## 2020-08-10 RX ORDER — POLYETHYLENE GLYCOL 3350 17 G/17G
17 POWDER, FOR SOLUTION ORAL DAILY
Refills: 0 | Status: DISCONTINUED | OUTPATIENT
Start: 2020-08-11 | End: 2020-08-13

## 2020-08-10 RX ORDER — POLYETHYLENE GLYCOL 3350 17 G/17G
17 POWDER, FOR SOLUTION ORAL ONCE
Refills: 0 | Status: COMPLETED | OUTPATIENT
Start: 2020-08-10 | End: 2020-08-10

## 2020-08-10 RX ADMIN — Medication 20 MILLIGRAM(S): at 17:37

## 2020-08-10 RX ADMIN — Medication 650 MILLIGRAM(S): at 09:24

## 2020-08-10 RX ADMIN — Medication 20 MILLIGRAM(S): at 05:04

## 2020-08-10 RX ADMIN — Medication 125 MICROGRAM(S): at 05:05

## 2020-08-10 RX ADMIN — NYSTATIN CREAM 1 APPLICATION(S): 100000 CREAM TOPICAL at 05:05

## 2020-08-10 RX ADMIN — Medication 650 MILLIGRAM(S): at 21:55

## 2020-08-10 RX ADMIN — Medication 30 MILLIGRAM(S): at 05:04

## 2020-08-10 RX ADMIN — APIXABAN 10 MILLIGRAM(S): 2.5 TABLET, FILM COATED ORAL at 05:04

## 2020-08-10 RX ADMIN — NYSTATIN CREAM 1 APPLICATION(S): 100000 CREAM TOPICAL at 17:37

## 2020-08-10 RX ADMIN — Medication 81 MILLIGRAM(S): at 14:09

## 2020-08-10 RX ADMIN — PREGABALIN 1000 MICROGRAM(S): 225 CAPSULE ORAL at 14:09

## 2020-08-10 RX ADMIN — SIMVASTATIN 20 MILLIGRAM(S): 20 TABLET, FILM COATED ORAL at 21:55

## 2020-08-10 RX ADMIN — POLYETHYLENE GLYCOL 3350 17 GRAM(S): 17 POWDER, FOR SOLUTION ORAL at 21:55

## 2020-08-10 RX ADMIN — Medication 650 MILLIGRAM(S): at 08:54

## 2020-08-10 RX ADMIN — Medication 650 MILLIGRAM(S): at 22:30

## 2020-08-10 RX ADMIN — BUDESONIDE AND FORMOTEROL FUMARATE DIHYDRATE 2 PUFF(S): 160; 4.5 AEROSOL RESPIRATORY (INHALATION) at 21:55

## 2020-08-10 RX ADMIN — APIXABAN 10 MILLIGRAM(S): 2.5 TABLET, FILM COATED ORAL at 17:37

## 2020-08-10 RX ADMIN — BUDESONIDE AND FORMOTEROL FUMARATE DIHYDRATE 2 PUFF(S): 160; 4.5 AEROSOL RESPIRATORY (INHALATION) at 08:54

## 2020-08-10 NOTE — PROGRESS NOTE ADULT - SUBJECTIVE AND OBJECTIVE BOX
Patient is a 75y old  Female who presents with a chief complaint of Weakness, inability to ambulate (10 Aug 2020 07:29)      SUBJECTIVE / OVERNIGHT EVENTS: Pt seen and examined at 12:20pm, no overnight events, pt still reports LE weakness and numbness. No other new issues reported.    MEDICATIONS  (STANDING):  apixaban 10 milliGRAM(s) Oral two times a day  aspirin enteric coated 81 milliGRAM(s) Oral daily  budesonide 160 MICROgram(s)/formoterol 4.5 MICROgram(s) Inhaler 2 Puff(s) Inhalation two times a day  cyanocobalamin Injectable 1000 MICROGram(s) IntraMuscular daily  dextrose 5%. 1000 milliLiter(s) (50 mL/Hr) IV Continuous <Continuous>  dextrose 50% Injectable 12.5 Gram(s) IV Push once  dextrose 50% Injectable 25 Gram(s) IV Push once  dextrose 50% Injectable 25 Gram(s) IV Push once  enalapril 20 milliGRAM(s) Oral two times a day  insulin lispro (HumaLOG) corrective regimen sliding scale   SubCutaneous three times a day before meals  levothyroxine 125 MICROGram(s) Oral daily  NIFEdipine XL 30 milliGRAM(s) Oral daily  nystatin Powder 1 Application(s) Topical two times a day  simvastatin 20 milliGRAM(s) Oral at bedtime    MEDICATIONS  (PRN):  acetaminophen   Tablet .. 650 milliGRAM(s) Oral every 6 hours PRN Moderate Pain (4 - 6)  ALBUTerol    90 MICROgram(s) HFA Inhaler 2 Puff(s) Inhalation every 6 hours PRN Shortness of Breath and/or Wheezing  dextrose 40% Gel 15 Gram(s) Oral once PRN Blood Glucose LESS THAN 70 milliGRAM(s)/deciliter  glucagon  Injectable 1 milliGRAM(s) IntraMuscular once PRN Glucose LESS THAN 70 milligrams/deciliter      Vital Signs Last 24 Hrs  T(C): 36.8 (10 Aug 2020 12:44), Max: 36.8 (09 Aug 2020 21:18)  T(F): 98.2 (10 Aug 2020 12:44), Max: 98.3 (09 Aug 2020 21:18)  HR: 61 (10 Aug 2020 12:44) (61 - 70)  BP: 105/53 (10 Aug 2020 12:44) (105/53 - 120/53)  BP(mean): --  RR: 17 (10 Aug 2020 12:44) (17 - 18)  SpO2: 100% (10 Aug 2020 12:44) (100% - 100%)  CAPILLARY BLOOD GLUCOSE      POCT Blood Glucose.: 111 mg/dL (10 Aug 2020 12:22)  POCT Blood Glucose.: 109 mg/dL (10 Aug 2020 08:52)  POCT Blood Glucose.: 128 mg/dL (09 Aug 2020 22:44)  POCT Blood Glucose.: 113 mg/dL (09 Aug 2020 17:07)    I&O's Summary      PHYSICAL EXAM:  GENERAL: NAD, well-developed  CHEST/LUNG: Clear to auscultation bilaterally; No wheeze  HEART: Regular rate and rhythm  ABDOMEN: Soft, Nontender, Nondistended  EXTREMITIES:  no LE edema  PSYCH: Calm  NEUROLOGY: AAOx3  SKIN: + vitiligo    LABS:                        8.4    6.14  )-----------( 236      ( 10 Aug 2020 06:40 )             24.9     08-10    139  |  102  |  21  ----------------------------<  96  4.1   |  27  |  1.04    Ca    10.3      10 Aug 2020 06:40  Mg     2.1     08-10                RADIOLOGY & ADDITIONAL TESTS:    Imaging Personally Reviewed:    Consultant(s) Notes Reviewed:      Care Discussed with Consultants/Other Providers:

## 2020-08-10 NOTE — DISCHARGE NOTE PROVIDER - NSDCMRMEDTOKEN_GEN_ALL_CORE_FT
3 mL IM syringe : 1 unit(s) intramuscular once a day   Advair Diskus 500 mcg-50 mcg inhalation powder: 1 puff(s) inhaled 2 times a day  aspirin 81 mg oral tablet: 1 tab(s) orally once a day  clotrimazole 1% topical cream: 1 application topically 2 times a day  cyanocobalamin 1000 mcg/mL injectable solution: 1000 microgram(s) intramuscularly once a day.    Eliquis 5 mg oral tablet: 2 tab(s) orally every 12 hours until 08/12 then take 1 tab orally every 12 hours for 3 months  enalapril 20 mg oral tablet: 1 tab(s) orally 2 times a day  levothyroxine 125 mcg (0.125 mg) oral capsule: 1 cap(s) orally once a day  NIFEdipine 30 mg oral tablet, extended release: 1 tab(s) orally once a day  nystatin 100,000 units/g topical powder: Apply topically to affected area 2 times a day   Outpatient Physical Therapy: dx: B12 neuropathy  ICD-10: G90.09  Proventil HFA 90 mcg/inh inhalation aerosol: 2 puff(s) inhaled 4 times a day, As Needed  simvastatin 20 mg oral tablet: 1 tab(s) orally once a day (at bedtime) 3 mL IM syringe : 1 unit(s) intramuscular once a day   Advair Diskus 500 mcg-50 mcg inhalation powder: 1 puff(s) inhaled 2 times a day  aspirin 81 mg oral tablet: 1 tab(s) orally once a day  cyanocobalamin 1000 mcg/mL injectable solution: 1000 microgram(s) intramuscularly once a day.    Eliquis 5 mg oral tablet: 2 tab(s) orally every 12 hours until 08/12 then take 1 tab orally every 12 hours for 3 months  enalapril 20 mg oral tablet: 1 tab(s) orally 2 times a day  levothyroxine 125 mcg (0.125 mg) oral capsule: 1 cap(s) orally once a day  NIFEdipine 30 mg oral tablet, extended release: 1 tab(s) orally once a day  nystatin 100,000 units/g topical powder: Apply topically to affected area 2 times a day   Outpatient Physical Therapy: dx: B12 neuropathy  ICD-10: G90.09  polyethylene glycol 3350 oral powder for reconstitution: 17 gram(s) orally once a day, As Needed  Proventil HFA 90 mcg/inh inhalation aerosol: 2 puff(s) inhaled 4 times a day, As Needed  simvastatin 20 mg oral tablet: 1 tab(s) orally once a day (at bedtime) 3 mL IM syringe : 1 unit(s) intramuscular once a day   Advair Diskus 500 mcg-50 mcg inhalation powder: 1 puff(s) inhaled 2 times a day  apixaban 5 mg oral tablet: 1 tab(s) orally every 12 hours  aspirin 81 mg oral tablet: 1 tab(s) orally once a day  cyanocobalamin 1000 mcg/mL injectable solution: 1000 microgram(s) intramuscularly once a day.    enalapril 20 mg oral tablet: 1 tab(s) orally 2 times a day  levothyroxine 125 mcg (0.125 mg) oral capsule: 1 cap(s) orally once a day  NIFEdipine 30 mg oral tablet, extended release: 1 tab(s) orally once a day  nystatin 100,000 units/g topical powder: Apply topically to affected area 2 times a day   Outpatient Physical Therapy: dx: B12 neuropathy  ICD-10: G90.09  polyethylene glycol 3350 oral powder for reconstitution: 17 gram(s) orally once a day, As Needed  Proventil HFA 90 mcg/inh inhalation aerosol: 2 puff(s) inhaled 4 times a day, As Needed  simvastatin 20 mg oral tablet: 1 tab(s) orally once a day (at bedtime) 3 mL IM syringe : 1 unit(s) intramuscular once a day   Advair Diskus 500 mcg-50 mcg inhalation powder: 1 puff(s) inhaled 2 times a day  apixaban 5 mg oral tablet: 1 tab(s) orally every 12 hours  Aspirin Enteric Coated 81 mg oral delayed release tablet: 1 tab(s) orally once a day   cyanocobalamin 1000 mcg/mL injectable solution: 1000 microgram(s) intramuscularly once a day.    enalapril 20 mg oral tablet: 1 tab(s) orally 2 times a day  levothyroxine 125 mcg (0.125 mg) oral capsule: 1 cap(s) orally once a day  NIFEdipine 30 mg oral tablet, extended release: 1 tab(s) orally once a day  nystatin 100,000 units/g topical powder: Apply topically to affected area 2 times a day   Outpatient Physical Therapy: dx: B12 neuropathy  ICD-10: G90.09  polyethylene glycol 3350 oral powder for reconstitution: 17 gram(s) orally once a day, As Needed  Proventil HFA 90 mcg/inh inhalation aerosol: 2 puff(s) inhaled 4 times a day, As Needed  simvastatin 20 mg oral tablet: 1 tab(s) orally once a day (at bedtime)

## 2020-08-10 NOTE — DISCHARGE NOTE PROVIDER - HOSPITAL COURSE
75F with PMH of T2DM, HTN, asthma, hypothyroidism, vitiligo, CAD s/p MI, recent dx of BLE DVT on Eliquis, and B12 deficiency c/b LE weakness who presents to the hospital for inability to ambulate and persistent loss of sensation/weakness to the lower extremities.  CT of L spine lumbar spondylosis. There is central canal stenosis noted at multiple levels, most prominent at the L4-5 region. Venous duplex of LE confirmed b/l DVTs. EKG was reviewed - SR 83. No acute ST/T changes. The patient is now admitted due to persistent neurologic deficits (sensory loss/inability to ambulate) likely related to B12 deficiency.        hospital course:          B12 neuropathy.       -Persistent sensory loss and weakness of LE. Unable to ambulate. Intrinsic factor Ab indeterminant.    -Recheck B12 level.    -Continue w/ B12 1000mcg IM daily.     -PT evaluation. Rehab family and patient wants home     -Neuro consulted - likely all from B12 deficiency, Symptoms take months to improve with supplementation    Cont IM B12 supplement         SKYLAR (acute kidney injury).       Repeat BMP.     Avoid nephrotoxins. Encourage hydration.           Acute deep vein thrombosis (DVT) of both lower extremities, unspecified vein.       -Stable.    -Continue Eliquis 10 BID until 8/12. Switch to 5mg BID on 8/13.         Hypothyroidism.       -FT4 was normal, TSH was low on last admission.    -Will repeat TSH.    -Adjust levothyroxine as appropriate- cont home dose for now.         DM2 (diabetes mellitus, type 2).      -Taken off metformin due to B12 def.    -FS well controlled by diet.    -Carb controlled diet.    -Correctional insulin ordered.          Asthma.     -Continue inhalers - therapeutic interchange for home regimen.    -Albuterol PRN.         Essential hypertension.      -Stable. Cont enalapril, nifedipine.          CAD (coronary artery disease).       -Stable. Cont ASA, simvastatin.          Need for prophylactic measure.      DVT ppx: Already on Eliquis.         Dispo : Family wants discharge home- pt recs rehab. As per Dr. Hernández patient cleared for discharge 8/11. 76 y/o Female, with a PmHx of T2DM, HTN, Asthma, Hypothyroidism, Vitiligo, CAD s/p MI, recent dx of BLE DVT on Eliquis, and B12 deficiency, c/b LE weakness who presents to the hospital for inability to ambulate and persistent loss of sensation/weakness to the lower extremities. The patient is now admitted due to persistent neurologic deficits (sensory loss/inability to ambulate) likely related to B12 deficiency.        On admission:        1. B12 Neuropathy    EKG: NSR @ 75, T inv III, V2-3    COVID neg         Methylmaloinic acid: 13,358          UCX neg    hsTrop: 9-->8     -Persistent sensory loss and weakness of LE. Unable to ambulate. Intrinsic factor Ab indeterminant.    8/6 EGD - normal esophagus. Gastric muroal atrophy. A few gastric polyps biopsided. Normal duodenal bulb, first part of the duodenum and 2nd part of the duodenum. Biopsies were taken with a cold forceps for histology on the greater curvatrure of the gastric body, on the lesser curvatrure of the gastric body, at the incisura, on the greater curvature of the gastric antrum and on the lesser curvature of the gastric antrum.    -Continue w/ B12 1000mcg IM daily.     8/8 CT Lumbar Spine - multi level lumbar spondylosis which is most pronounced at the L4-5 level where there is moderate central canal stenosis and L3-4 and L5-S1 levels where there is mild central canal stenosis        2. SKYLAR    Bun/Cr: 20/1.45; now resolved    - Avoid nephrotoxins. Encourage hydration.         3. Acute deep vein thrombosis (DVT) of both lower extremities, unspecified vein.      8/8 B/L LE US - acute DVT above and below right knee and acute dvt above Left knee    -Continue Eliquis 10 BID until 8/12. Switch to 5mg BID on 8/13.         3. Hypothyroidism.       -FT4 was normal, TSH was low on last admission.    -Will repeat TSH.    -Adjust levothyroxine as appropriate- cont home dose for now.         4. DM2    -Taken off metformin due to B12 def.    -FS well controlled by diet.    -Carb controlled diet.    -Correctional insulin ordered.          5. Asthma     -Continue inhalers - therapeutic interchange for home regimen.    -Albuterol PRN.        6. Essential hypertension.      -Stable. Cont enalapril, nifedipine.         7. CAD (coronary artery disease).       -Stable. Cont ASA, simvastatin        Pt comfortable at this time and is now medically cleared for discharge home as per Dr. Hernández. Pt was recommended for Rehab but refused to go and is now going home. Outpatient follow up.        Reviewed discharge medications with patient; All new medications requiring new prescription sent to pharmacy of patients choice. Reviewed need for prescription from previous home medications and new prescriptions sent if requested. Patient in agreement and understands. 76 y/o Female, with a PmHx of T2DM, HTN, Asthma, Hypothyroidism, Vitiligo, CAD s/p MI, recent dx of BLE DVT on Eliquis, and B12 deficiency, c/b LE weakness who presents to the hospital for inability to ambulate and persistent loss of sensation/weakness to the lower extremities. The patient is now admitted due to persistent neurologic deficits (sensory loss/inability to ambulate) likely related to B12 deficiency.        On admission:        1. B12 Neuropathy    EKG: NSR @ 75, T inv III, V2-3    COVID neg         Methylmaloinic acid: 13,358          UCX neg    hsTrop: 9-->8     -Persistent sensory loss and weakness of LE. Unable to ambulate. Intrinsic factor Ab indeterminant.    8/6 EGD - normal esophagus. Gastric muroal atrophy. A few gastric polyps biopsided. Normal duodenal bulb, first part of the duodenum and 2nd part of the duodenum. Biopsies were taken with a cold forceps for histology on the greater curvatrure of the gastric body, on the lesser curvatrure of the gastric body, at the incisura, on the greater curvature of the gastric antrum and on the lesser curvature of the gastric antrum.    -Continue w/ B12 1000mcg IM daily.     8/8 CT Lumbar Spine - multi level lumbar spondylosis which is most pronounced at the L4-5 level where there is moderate central canal stenosis and L3-4 and L5-S1 levels where there is mild central canal stenosis        2. SKYLAR    Bun/Cr: 20/1.45; now resolved    - Avoid nephrotoxins. Encourage hydration.         3. Acute deep vein thrombosis (DVT) of both lower extremities, unspecified vein.      8/8 B/L LE US - acute DVT above and below right knee and acute dvt above Left knee    -Continue Eliquis 10 BID until 8/12. Switch to 5mg BID on 8/13.         3. Hypothyroidism.       -FT4 was normal, TSH was low on last admission.    -Will repeat TSH.    -Adjust levothyroxine as appropriate- cont home dose for now.         4. DM2    -Taken off metformin due to B12 def.    -FS well controlled by diet.    -Carb controlled diet.    -Correctional insulin ordered.          5. Asthma     -Continue inhalers - therapeutic interchange for home regimen.    -Albuterol PRN.        6. Essential hypertension.      -Stable. Cont enalapril, nifedipine.         7. CAD (coronary artery disease).       -Stable. Cont ASA, simvastatin    Constipation treated with bowel regimen, had bm, stable for d/c        Pt comfortable at this time and is now medically cleared for discharge home as per Dr. Hernández. Pt was recommended for Rehab but refused to go and is now going home. Outpatient follow up.        Reviewed discharge medications with patient; All new medications requiring new prescription sent to pharmacy of patients choice. Reviewed need for prescription from previous home medications and new prescriptions sent if requested. Patient in agreement and understands.

## 2020-08-10 NOTE — PROGRESS NOTE ADULT - PROBLEM SELECTOR PLAN 1
-Persistent sensory loss and weakness of LE. Unable to ambulate. Intrinsic factor Ab indeterminant.  -F/u gastrin level pending  -MMM and anti-parietal cell Ab elevated  -F/u path report from EGD.  -Continue w/ B12 1000mcg IM daily.   -PT rec rehab, but family decline rehab, unable to reach daughter will attempt later  d/c today, d/c planning time spent in coordination 40 mts ( preparing d/c summary and plan)  - Neurology consulted. appreciate recs

## 2020-08-10 NOTE — DISCHARGE NOTE PROVIDER - NSDCCPCAREPLAN_GEN_ALL_CORE_FT
PRINCIPAL DISCHARGE DIAGNOSIS  Diagnosis: Weakness of both legs  Assessment and Plan of Treatment: Secondary to b12 deficiency. Continue B12 injections as prescribed. Physical therapy recommending rehab however family wants home. Conitnue fall precautions and strengthening.      SECONDARY DISCHARGE DIAGNOSES  Diagnosis: Acute deep vein thrombosis (DVT) of both lower extremities, unspecified vein  Assessment and Plan of Treatment: Starting 8/13 take Eliquis 5mg twice a day.    Diagnosis: SKYLAR (acute kidney injury)  Assessment and Plan of Treatment: Resolved with fluids.    Diagnosis: Asthma  Assessment and Plan of Treatment: Continue home regimen.    Diagnosis: Essential hypertension  Assessment and Plan of Treatment: Continue blood pressure medication regimen as directed. Monitor for any visual changes, headaches or dizziness.  Monitor blood pressure regularly.  Follow up with your PCP for further management for high blood pressure.      Diagnosis: CAD (coronary artery disease)  Assessment and Plan of Treatment: Continue aspirin and simvastatin.    Diagnosis: DM2 (diabetes mellitus, type 2)  Assessment and Plan of Treatment: Metformin held due to b12 deficiency. Monitor blood sugars daily and follow up with your PCP within 1-2 weeks of discharge.    Diagnosis: Hypothyroidism  Assessment and Plan of Treatment: Continue home dose of levothyroxine. PRINCIPAL DISCHARGE DIAGNOSIS  Diagnosis: Weakness of both legs  Assessment and Plan of Treatment: Secondary to b12 deficiency. Continue B12 injections as prescribed. Physical therapy recommending rehab however family wants home. Conitnue fall precautions and strengthening.      SECONDARY DISCHARGE DIAGNOSES  Diagnosis: CAD (coronary artery disease)  Assessment and Plan of Treatment: To be asymptomatic, to reduce risks factors such as hypertension, diabetes and hyperlipidemia to lower the risk of blood clots formation; and to prevent complications of coronary artery disease such as worsening chest pain, heart attack and death.   Continue aspirin, do not stop unless instructed by your physician.  Continue low salt, fat, cholesterol and carbohydrate diet. Follow up with cardiologist and primary care physician's routine appointment.    Diagnosis: Essential hypertension  Assessment and Plan of Treatment: To maintain a normal blood pressure to prevent heart attack, stroke and renal failure.   Low sodium and fat diet, continue anti-hypertensive medications, and follow up with primary care physician.    Diagnosis: Asthma  Assessment and Plan of Treatment: To prevent long-term (chronic) symptoms that interfere with daily living, such as coughing, wheezing or shortness of breath during the night or after exercise.  To be able to participate in all activities of daily living, including work, school, and exercise.  To maintain near normal pulmonary function test and prevent asthma exacerbation.   Continue current medications as prescribed.  Avoid exposures to environmental allergens such as carpets, pets and both first-hand and second-hand smoking.  During seasonal allergy period, take a shower as soon as you get home and change your clothes immediately.  Follow up your routine medical appointments.    Diagnosis: DM2 (diabetes mellitus, type 2)  Assessment and Plan of Treatment: To maintain a normal blood glucose level and HgA1C level < 5.7 and to prevent diabetic complications such as uncontrolled diabetes, diabetic coma, blindness, renal failure, and amputations.   Monitor finger sticks pre-meal and bedtime, low salt, fat and carbohydrate diet, minimize glucose intake.  Exercise daily for at least 30 minutes and weight loss.  Follow up with primary care physician and endocrinologist for routine Hemoglobin A1C checks and management.  Follow up with your ophthalmologist for routine yearly vision exams.   Metformin held due to b12 deficiency. Monitor blood sugars daily and follow up with your PCP within 1-2 weeks of discharge    Diagnosis: Hypothyroidism  Assessment and Plan of Treatment: Continue home dose of levothyroxine.    Diagnosis: Acute deep vein thrombosis (DVT) of both lower extremities, unspecified vein  Assessment and Plan of Treatment: Starting 8/13 take Eliquis 5mg twice a day.    Diagnosis: SKYLAR (acute kidney injury)  Assessment and Plan of Treatment: Resolved.

## 2020-08-11 ENCOUNTER — TRANSCRIPTION ENCOUNTER (OUTPATIENT)
Age: 75
End: 2020-08-11

## 2020-08-11 DIAGNOSIS — K59.00 CONSTIPATION, UNSPECIFIED: ICD-10-CM

## 2020-08-11 LAB
ANION GAP SERPL CALC-SCNC: 10 MMO/L — SIGNIFICANT CHANGE UP (ref 7–14)
BUN SERPL-MCNC: 24 MG/DL — HIGH (ref 7–23)
CALCIUM SERPL-MCNC: 10 MG/DL — SIGNIFICANT CHANGE UP (ref 8.4–10.5)
CHLORIDE SERPL-SCNC: 103 MMOL/L — SIGNIFICANT CHANGE UP (ref 98–107)
CO2 SERPL-SCNC: 27 MMOL/L — SIGNIFICANT CHANGE UP (ref 22–31)
CREAT SERPL-MCNC: 0.97 MG/DL — SIGNIFICANT CHANGE UP (ref 0.5–1.3)
GASTRIN SERPL-MCNC: 23 PG/ML — SIGNIFICANT CHANGE UP
GLUCOSE BLDC GLUCOMTR-MCNC: 103 MG/DL — HIGH (ref 70–99)
GLUCOSE BLDC GLUCOMTR-MCNC: 104 MG/DL — HIGH (ref 70–99)
GLUCOSE BLDC GLUCOMTR-MCNC: 108 MG/DL — HIGH (ref 70–99)
GLUCOSE BLDC GLUCOMTR-MCNC: 117 MG/DL — HIGH (ref 70–99)
GLUCOSE SERPL-MCNC: 95 MG/DL — SIGNIFICANT CHANGE UP (ref 70–99)
HCT VFR BLD CALC: 23.6 % — LOW (ref 34.5–45)
HGB BLD-MCNC: 7.9 G/DL — LOW (ref 11.5–15.5)
MAGNESIUM SERPL-MCNC: 2.2 MG/DL — SIGNIFICANT CHANGE UP (ref 1.6–2.6)
MCHC RBC-ENTMCNC: 33.5 % — SIGNIFICANT CHANGE UP (ref 32–36)
MCHC RBC-ENTMCNC: 42 PG — HIGH (ref 27–34)
MCV RBC AUTO: 125.5 FL — HIGH (ref 80–100)
NRBC # FLD: 0.02 K/UL — SIGNIFICANT CHANGE UP (ref 0–0)
PLATELET # BLD AUTO: 284 K/UL — SIGNIFICANT CHANGE UP (ref 150–400)
PMV BLD: 11.2 FL — SIGNIFICANT CHANGE UP (ref 7–13)
POTASSIUM SERPL-MCNC: 4.7 MMOL/L — SIGNIFICANT CHANGE UP (ref 3.5–5.3)
POTASSIUM SERPL-SCNC: 4.7 MMOL/L — SIGNIFICANT CHANGE UP (ref 3.5–5.3)
RBC # BLD: 1.88 M/UL — LOW (ref 3.8–5.2)
RBC # FLD: 15.1 % — HIGH (ref 10.3–14.5)
SODIUM SERPL-SCNC: 140 MMOL/L — SIGNIFICANT CHANGE UP (ref 135–145)
WBC # BLD: 5.66 K/UL — SIGNIFICANT CHANGE UP (ref 3.8–10.5)
WBC # FLD AUTO: 5.66 K/UL — SIGNIFICANT CHANGE UP (ref 3.8–10.5)

## 2020-08-11 PROCEDURE — 99232 SBSQ HOSP IP/OBS MODERATE 35: CPT

## 2020-08-11 PROCEDURE — 74018 RADEX ABDOMEN 1 VIEW: CPT | Mod: 26

## 2020-08-11 RX ORDER — LACTULOSE 10 G/15ML
30 SOLUTION ORAL ONCE
Refills: 0 | Status: COMPLETED | OUTPATIENT
Start: 2020-08-11 | End: 2020-08-11

## 2020-08-11 RX ORDER — POLYETHYLENE GLYCOL 3350 17 G/17G
17 POWDER, FOR SOLUTION ORAL
Qty: 0 | Refills: 0 | DISCHARGE
Start: 2020-08-11

## 2020-08-11 RX ORDER — SENNA PLUS 8.6 MG/1
1 TABLET ORAL DAILY
Refills: 0 | Status: DISCONTINUED | OUTPATIENT
Start: 2020-08-11 | End: 2020-08-13

## 2020-08-11 RX ADMIN — BUDESONIDE AND FORMOTEROL FUMARATE DIHYDRATE 2 PUFF(S): 160; 4.5 AEROSOL RESPIRATORY (INHALATION) at 21:09

## 2020-08-11 RX ADMIN — Medication 650 MILLIGRAM(S): at 05:17

## 2020-08-11 RX ADMIN — Medication 650 MILLIGRAM(S): at 06:00

## 2020-08-11 RX ADMIN — SIMVASTATIN 20 MILLIGRAM(S): 20 TABLET, FILM COATED ORAL at 21:10

## 2020-08-11 RX ADMIN — Medication 81 MILLIGRAM(S): at 13:10

## 2020-08-11 RX ADMIN — NYSTATIN CREAM 1 APPLICATION(S): 100000 CREAM TOPICAL at 17:03

## 2020-08-11 RX ADMIN — SENNA PLUS 1 TABLET(S): 8.6 TABLET ORAL at 13:12

## 2020-08-11 RX ADMIN — Medication 20 MILLIGRAM(S): at 17:03

## 2020-08-11 RX ADMIN — PREGABALIN 1000 MICROGRAM(S): 225 CAPSULE ORAL at 13:09

## 2020-08-11 RX ADMIN — Medication 30 MILLIGRAM(S): at 05:18

## 2020-08-11 RX ADMIN — POLYETHYLENE GLYCOL 3350 17 GRAM(S): 17 POWDER, FOR SOLUTION ORAL at 13:09

## 2020-08-11 RX ADMIN — Medication 1 ENEMA: at 16:58

## 2020-08-11 RX ADMIN — APIXABAN 10 MILLIGRAM(S): 2.5 TABLET, FILM COATED ORAL at 05:18

## 2020-08-11 RX ADMIN — NYSTATIN CREAM 1 APPLICATION(S): 100000 CREAM TOPICAL at 05:18

## 2020-08-11 RX ADMIN — APIXABAN 10 MILLIGRAM(S): 2.5 TABLET, FILM COATED ORAL at 17:03

## 2020-08-11 RX ADMIN — Medication 10 MILLIGRAM(S): at 13:55

## 2020-08-11 RX ADMIN — Medication 20 MILLIGRAM(S): at 05:18

## 2020-08-11 RX ADMIN — Medication 125 MICROGRAM(S): at 05:18

## 2020-08-11 RX ADMIN — LACTULOSE 30 GRAM(S): 10 SOLUTION ORAL at 18:43

## 2020-08-11 RX ADMIN — BUDESONIDE AND FORMOTEROL FUMARATE DIHYDRATE 2 PUFF(S): 160; 4.5 AEROSOL RESPIRATORY (INHALATION) at 09:38

## 2020-08-11 NOTE — DISCHARGE NOTE NURSING/CASE MANAGEMENT/SOCIAL WORK - PATIENT PORTAL LINK FT
You can access the FollowMyHealth Patient Portal offered by [facility] by registering at the following website: [email or web address]. By joining [facility]’s FollowMyHealth portal, you will also be able to view your health information using other applications (apps) compatible with our system.

## 2020-08-11 NOTE — DISCHARGE NOTE NURSING/CASE MANAGEMENT/SOCIAL WORK - NSSCTYPOFSERV_GEN_ALL_CORE
Nurse to call and visit day after discharge , Home Physical Therapy, Evaluate for Aide, Long term Planning

## 2020-08-11 NOTE — PROGRESS NOTE ADULT - SUBJECTIVE AND OBJECTIVE BOX
Patient is a 75y old  Female who presents with a chief complaint of Weakness, inability to ambulate (10 Aug 2020 14:53)      SUBJECTIVE / OVERNIGHT EVENTS: Pt seen and examined at 11:20am, overnight events noted, pt reports no bm in 4 days and some abdominal discomfort due to this, no nausea, vomiting or any other new complaints, does not want to go to rehab. No other new issues reported.        MEDICATIONS  (STANDING):  apixaban 10 milliGRAM(s) Oral two times a day  aspirin enteric coated 81 milliGRAM(s) Oral daily  budesonide 160 MICROgram(s)/formoterol 4.5 MICROgram(s) Inhaler 2 Puff(s) Inhalation two times a day  cyanocobalamin Injectable 1000 MICROGram(s) IntraMuscular daily  dextrose 5%. 1000 milliLiter(s) (50 mL/Hr) IV Continuous <Continuous>  dextrose 50% Injectable 12.5 Gram(s) IV Push once  dextrose 50% Injectable 25 Gram(s) IV Push once  dextrose 50% Injectable 25 Gram(s) IV Push once  enalapril 20 milliGRAM(s) Oral two times a day  insulin lispro (HumaLOG) corrective regimen sliding scale   SubCutaneous three times a day before meals  levothyroxine 125 MICROGram(s) Oral daily  NIFEdipine XL 30 milliGRAM(s) Oral daily  nystatin Powder 1 Application(s) Topical two times a day  polyethylene glycol 3350 17 Gram(s) Oral daily  polyethylene glycol 3350      senna 1 Tablet(s) Oral daily  simvastatin 20 milliGRAM(s) Oral at bedtime    MEDICATIONS  (PRN):  acetaminophen   Tablet .. 650 milliGRAM(s) Oral every 6 hours PRN Moderate Pain (4 - 6)  ALBUTerol    90 MICROgram(s) HFA Inhaler 2 Puff(s) Inhalation every 6 hours PRN Shortness of Breath and/or Wheezing  bisacodyl Suppository 10 milliGRAM(s) Rectal daily PRN Constipation  dextrose 40% Gel 15 Gram(s) Oral once PRN Blood Glucose LESS THAN 70 milliGRAM(s)/deciliter  glucagon  Injectable 1 milliGRAM(s) IntraMuscular once PRN Glucose LESS THAN 70 milligrams/deciliter      Vital Signs Last 24 Hrs  T(C): 36.8 (11 Aug 2020 12:59), Max: 36.8 (11 Aug 2020 12:59)  T(F): 98.2 (11 Aug 2020 12:59), Max: 98.2 (11 Aug 2020 12:59)  HR: 63 (11 Aug 2020 12:59) (63 - 67)  BP: 137/72 (11 Aug 2020 12:59) (107/57 - 137/72)  BP(mean): --  RR: 16 (11 Aug 2020 12:59) (16 - 18)  SpO2: 99% (11 Aug 2020 12:59) (97% - 99%)  CAPILLARY BLOOD GLUCOSE      POCT Blood Glucose.: 108 mg/dL (11 Aug 2020 12:00)  POCT Blood Glucose.: 103 mg/dL (11 Aug 2020 08:26)  POCT Blood Glucose.: 133 mg/dL (10 Aug 2020 22:26)  POCT Blood Glucose.: 108 mg/dL (10 Aug 2020 17:23)    I&O's Summary      PHYSICAL EXAM:  GENERAL: NAD, well-developed  CHEST/LUNG: Clear to auscultation bilaterally; No wheeze  HEART: Regular rate and rhythm  ABDOMEN: Soft, Nontender, Nondistended  EXTREMITIES:  no LE edema  PSYCH: Calm  NEUROLOGY: AAOx3  SKIN: + vitiligo    LABS:                        7.9    5.66  )-----------( 284      ( 11 Aug 2020 06:30 )             23.6     08-11    140  |  103  |  24<H>  ----------------------------<  95  4.7   |  27  |  0.97    Ca    10.0      11 Aug 2020 06:30  Mg     2.2     08-11                RADIOLOGY & ADDITIONAL TESTS:    Imaging Personally Reviewed:    Consultant(s) Notes Reviewed:      Care Discussed with Consultants/Other Providers:

## 2020-08-11 NOTE — CHART NOTE - NSCHARTNOTEFT_GEN_A_CORE
Medicine Subsequent Hospital Care Note- ACP  CC:  Abdominal discomfort, no BM x 3 days    HPI/Subjective:  75F with PMH of T2DM, HTN, asthma, hypothyroidism, vitiligo, CAD s/p MI, recent dx of BLE DVT on Eliquis, and B12 deficiency c/b LE weakness who presents to the hospital for inability to ambulate and persistent loss of sensation/weakness to the lower extremities.    ROS:  Pt complained of abdominal discomfort.   Denies fever, chills, diaphoresis , malaise, night sweat, generalized weakness, SOB cough, sputum production, wheezing, hemoptysis, CP, SMITH, orthopnea, PND, palpitations, diaphoresis, lightheadedness, dizziness, syncope, edema. nausea, vomiting, diarrhea, constipation, abdominal pain, melena, hematochezia, dysphagia, dysuria, frequency, urgency, hematuria, nocturia.    -------------------------------------------------------------------------------------------------------------------------------------------------  Vital Signs:  Vital Signs Last 24 Hrs  T(C): 36.7 (08-10-20 @ 20:44), Max: 36.8 (08-10-20 @ 12:44)  T(F): 98.1 (08-10-20 @ 20:44), Max: 98.2 (08-10-20 @ 12:44)  HR: 64 (08-10-20 @ 20:44) (61 - 70)  BP: 107/57 (08-10-20 @ 20:44) (105/53 - 120/53)  RR: 18 (08-10-20 @ 20:44) (17 - 18)  SpO2: 97% (08-10-20 @ 20:44) (97% - 100%) on (O2)    Telemetry/Alarms:  General: WN/WD NAD  Neurology: Awake, nonfocal, SOLORIO x 4  Eyes: Scleras clear, PERRLA/ EOMI, Gross vision intact  ENT:Gross hearing intact, grossly patent pharynx, no stridor  Neck: Neck supple, trachea midline, No JVD,   Respiratory: CTA B/L, No wheezing, rales, rhonchi  CV: RRR, S1S2, no murmurs,   Abdominal: Soft, NT, ND +BS,   Extremities: No edema, + peripheral pulses  Skin: No Rashes,   Lymphatic: No Neck, axilla,   Psych: Oriented x 3, normal affect    Relevant labs, radiology and Medications reviewed                        8.4    6.14  )-----------( 236      ( 10 Aug 2020 06:40 )             24.9     08-10    139  |  102  |  21  ----------------------------<  96  4.1   |  27  |  1.04    Ca    10.3      10 Aug 2020 06:40  Mg     2.1     08-10        MEDICATIONS  (STANDING):  apixaban 10 milliGRAM(s) Oral two times a day  aspirin enteric coated 81 milliGRAM(s) Oral daily  budesonide 160 MICROgram(s)/formoterol 4.5 MICROgram(s) Inhaler 2 Puff(s) Inhalation two times a day  cyanocobalamin Injectable 1000 MICROGram(s) IntraMuscular daily  dextrose 5%. 1000 milliLiter(s) (50 mL/Hr) IV Continuous <Continuous>  dextrose 50% Injectable 12.5 Gram(s) IV Push once  dextrose 50% Injectable 25 Gram(s) IV Push once  dextrose 50% Injectable 25 Gram(s) IV Push once  enalapril 20 milliGRAM(s) Oral two times a day  insulin lispro (HumaLOG) corrective regimen sliding scale   SubCutaneous three times a day before meals  levothyroxine 125 MICROGram(s) Oral daily  NIFEdipine XL 30 milliGRAM(s) Oral daily  nystatin Powder 1 Application(s) Topical two times a day  polyethylene glycol 3350 17 Gram(s) Oral daily  polyethylene glycol 3350      simvastatin 20 milliGRAM(s) Oral at bedtime    MEDICATIONS  (PRN):  acetaminophen   Tablet .. 650 milliGRAM(s) Oral every 6 hours PRN Moderate Pain (4 - 6)  ALBUTerol    90 MICROgram(s) HFA Inhaler 2 Puff(s) Inhalation every 6 hours PRN Shortness of Breath and/or Wheezing  dextrose 40% Gel 15 Gram(s) Oral once PRN Blood Glucose LESS THAN 70 milliGRAM(s)/deciliter  glucagon  Injectable 1 milliGRAM(s) IntraMuscular once PRN Glucose LESS THAN 70 milligrams/deciliter    I&O's Summary    I reviewed the above lab results, tests, telemetry, and  EKG interpretation. .  Assessment  75y Female  w/ PAST MEDICAL & SURGICAL HISTORY:  Leg swelling  DVT (deep venous thrombosis), right  Hypothyroid  Ventral hernia  Diabetes  Asthma  Essential hypertension  No significant past surgical history  admitted with complaints of Patient is a 75y old  Female who presents with a chief complaint of Weakness, inability to ambulate (10 Aug 2020 14:53)  .now c/o _abdominal discomfort, pt stated did not have a bm x 3 days.     + constipation     PLAN  1) miralax daily ordered   2) encouraged fluids   Disposition: Full Code    Clinical findings, labs, tests reviewed. Will monitor patient closely.   [ x ]Low complexity/risk ( Time> 15min)

## 2020-08-11 NOTE — PROGRESS NOTE ADULT - PROBLEM SELECTOR PLAN 1
-Persistent sensory loss and weakness of LE. Unable to ambulate. Intrinsic factor Ab indeterminant.  -F/u gastrin level pending  -MMM and anti-parietal cell Ab elevated  -F/u path report from EGD.  -Continue w/ B12 1000mcg IM daily.   -PT rec rehab, but pt declined rehab, spoke with daughter Fallon, will d/c home  d/c today, d/c planning time spent in coordination 40 mts ( preparing d/c summary and plan)  - Neurology consulted. appreciate recs

## 2020-08-12 LAB
ANION GAP SERPL CALC-SCNC: 9 MMO/L — SIGNIFICANT CHANGE UP (ref 7–14)
BUN SERPL-MCNC: 21 MG/DL — SIGNIFICANT CHANGE UP (ref 7–23)
CALCIUM SERPL-MCNC: 10.1 MG/DL — SIGNIFICANT CHANGE UP (ref 8.4–10.5)
CHLORIDE SERPL-SCNC: 103 MMOL/L — SIGNIFICANT CHANGE UP (ref 98–107)
CO2 SERPL-SCNC: 28 MMOL/L — SIGNIFICANT CHANGE UP (ref 22–31)
CREAT SERPL-MCNC: 0.95 MG/DL — SIGNIFICANT CHANGE UP (ref 0.5–1.3)
GLUCOSE BLDC GLUCOMTR-MCNC: 120 MG/DL — HIGH (ref 70–99)
GLUCOSE BLDC GLUCOMTR-MCNC: 126 MG/DL — HIGH (ref 70–99)
GLUCOSE BLDC GLUCOMTR-MCNC: 99 MG/DL — SIGNIFICANT CHANGE UP (ref 70–99)
GLUCOSE BLDC GLUCOMTR-MCNC: 99 MG/DL — SIGNIFICANT CHANGE UP (ref 70–99)
GLUCOSE SERPL-MCNC: 97 MG/DL — SIGNIFICANT CHANGE UP (ref 70–99)
HCT VFR BLD CALC: 27 % — LOW (ref 34.5–45)
HGB BLD-MCNC: 8.5 G/DL — LOW (ref 11.5–15.5)
MAGNESIUM SERPL-MCNC: 2.1 MG/DL — SIGNIFICANT CHANGE UP (ref 1.6–2.6)
MCHC RBC-ENTMCNC: 31.5 % — LOW (ref 32–36)
MCHC RBC-ENTMCNC: 40.5 PG — HIGH (ref 27–34)
MCV RBC AUTO: 128.6 FL — HIGH (ref 80–100)
NRBC # FLD: 0 K/UL — SIGNIFICANT CHANGE UP (ref 0–0)
PLATELET # BLD AUTO: 376 K/UL — SIGNIFICANT CHANGE UP (ref 150–400)
PMV BLD: 10.7 FL — SIGNIFICANT CHANGE UP (ref 7–13)
POTASSIUM SERPL-MCNC: 4.8 MMOL/L — SIGNIFICANT CHANGE UP (ref 3.5–5.3)
POTASSIUM SERPL-SCNC: 4.8 MMOL/L — SIGNIFICANT CHANGE UP (ref 3.5–5.3)
RBC # BLD: 2.1 M/UL — LOW (ref 3.8–5.2)
RBC # FLD: 15.7 % — HIGH (ref 10.3–14.5)
SODIUM SERPL-SCNC: 140 MMOL/L — SIGNIFICANT CHANGE UP (ref 135–145)
WBC # BLD: 7.45 K/UL — SIGNIFICANT CHANGE UP (ref 3.8–10.5)
WBC # FLD AUTO: 7.45 K/UL — SIGNIFICANT CHANGE UP (ref 3.8–10.5)

## 2020-08-12 PROCEDURE — 99232 SBSQ HOSP IP/OBS MODERATE 35: CPT

## 2020-08-12 RX ADMIN — BUDESONIDE AND FORMOTEROL FUMARATE DIHYDRATE 2 PUFF(S): 160; 4.5 AEROSOL RESPIRATORY (INHALATION) at 21:05

## 2020-08-12 RX ADMIN — Medication 20 MILLIGRAM(S): at 05:29

## 2020-08-12 RX ADMIN — Medication 81 MILLIGRAM(S): at 14:07

## 2020-08-12 RX ADMIN — Medication 20 MILLIGRAM(S): at 17:30

## 2020-08-12 RX ADMIN — BUDESONIDE AND FORMOTEROL FUMARATE DIHYDRATE 2 PUFF(S): 160; 4.5 AEROSOL RESPIRATORY (INHALATION) at 08:36

## 2020-08-12 RX ADMIN — APIXABAN 10 MILLIGRAM(S): 2.5 TABLET, FILM COATED ORAL at 05:29

## 2020-08-12 RX ADMIN — NYSTATIN CREAM 1 APPLICATION(S): 100000 CREAM TOPICAL at 17:30

## 2020-08-12 RX ADMIN — NYSTATIN CREAM 1 APPLICATION(S): 100000 CREAM TOPICAL at 05:29

## 2020-08-12 RX ADMIN — POLYETHYLENE GLYCOL 3350 17 GRAM(S): 17 POWDER, FOR SOLUTION ORAL at 14:07

## 2020-08-12 RX ADMIN — SIMVASTATIN 20 MILLIGRAM(S): 20 TABLET, FILM COATED ORAL at 21:05

## 2020-08-12 RX ADMIN — Medication 30 MILLIGRAM(S): at 05:29

## 2020-08-12 RX ADMIN — Medication 125 MICROGRAM(S): at 05:29

## 2020-08-12 RX ADMIN — APIXABAN 10 MILLIGRAM(S): 2.5 TABLET, FILM COATED ORAL at 17:30

## 2020-08-12 RX ADMIN — PREGABALIN 1000 MICROGRAM(S): 225 CAPSULE ORAL at 14:07

## 2020-08-12 RX ADMIN — SENNA PLUS 1 TABLET(S): 8.6 TABLET ORAL at 14:07

## 2020-08-12 NOTE — PROGRESS NOTE ADULT - SUBJECTIVE AND OBJECTIVE BOX
Patient is a 75y old  Female who presents with a chief complaint of Weakness, inability to ambulate (11 Aug 2020 15:01)      SUBJECTIVE / OVERNIGHT EVENTS: Pt seen and examined at 11:40am, no overnight events, pt says still no bm, reports some abdominal discomfort due to this, no nausea, vomiting or any other new complaints, Had reported dizziness earlier now resolved, sitting in a chair, speaking with her brother on the phone, does not want to go to rehab. No other new issues reported.          MEDICATIONS  (STANDING):  apixaban 10 milliGRAM(s) Oral two times a day  aspirin enteric coated 81 milliGRAM(s) Oral daily  budesonide 160 MICROgram(s)/formoterol 4.5 MICROgram(s) Inhaler 2 Puff(s) Inhalation two times a day  cyanocobalamin Injectable 1000 MICROGram(s) IntraMuscular daily  dextrose 5%. 1000 milliLiter(s) (50 mL/Hr) IV Continuous <Continuous>  dextrose 50% Injectable 12.5 Gram(s) IV Push once  dextrose 50% Injectable 25 Gram(s) IV Push once  dextrose 50% Injectable 25 Gram(s) IV Push once  enalapril 20 milliGRAM(s) Oral two times a day  insulin lispro (HumaLOG) corrective regimen sliding scale   SubCutaneous three times a day before meals  levothyroxine 125 MICROGram(s) Oral daily  NIFEdipine XL 30 milliGRAM(s) Oral daily  nystatin Powder 1 Application(s) Topical two times a day  polyethylene glycol 3350 17 Gram(s) Oral daily  polyethylene glycol 3350      senna 1 Tablet(s) Oral daily  simvastatin 20 milliGRAM(s) Oral at bedtime  sorbitol 70%/mineral oil/magnesium hydroxide/glycerin Enema 120 milliLiter(s) Rectal once    MEDICATIONS  (PRN):  acetaminophen   Tablet .. 650 milliGRAM(s) Oral every 6 hours PRN Moderate Pain (4 - 6)  ALBUTerol    90 MICROgram(s) HFA Inhaler 2 Puff(s) Inhalation every 6 hours PRN Shortness of Breath and/or Wheezing  bisacodyl Suppository 10 milliGRAM(s) Rectal daily PRN Constipation  dextrose 40% Gel 15 Gram(s) Oral once PRN Blood Glucose LESS THAN 70 milliGRAM(s)/deciliter  glucagon  Injectable 1 milliGRAM(s) IntraMuscular once PRN Glucose LESS THAN 70 milligrams/deciliter      Vital Signs Last 24 Hrs  T(C): 37.2 (12 Aug 2020 12:00), Max: 37.2 (12 Aug 2020 12:00)  T(F): 98.9 (12 Aug 2020 12:00), Max: 98.9 (12 Aug 2020 12:00)  HR: 73 (12 Aug 2020 12:00) (62 - 73)  BP: 103/72 (12 Aug 2020 12:00) (103/72 - 133/70)  BP(mean): --  RR: 17 (12 Aug 2020 12:00) (17 - 18)  SpO2: 99% (12 Aug 2020 12:00) (98% - 99%)  CAPILLARY BLOOD GLUCOSE      POCT Blood Glucose.: 126 mg/dL (12 Aug 2020 12:14)  POCT Blood Glucose.: 99 mg/dL (12 Aug 2020 08:32)  POCT Blood Glucose.: 117 mg/dL (11 Aug 2020 22:17)  POCT Blood Glucose.: 104 mg/dL (11 Aug 2020 17:15)    I&O's Summary      PHYSICAL EXAM:  GENERAL: NAD, well-developed  CHEST/LUNG: Clear to auscultation bilaterally; No wheeze  HEART: Regular rate and rhythm  ABDOMEN: Soft, Nontender, Nondistended  EXTREMITIES:  no LE edema  PSYCH: Calm  NEUROLOGY: AAOx3  SKIN: + vitiligo      LABS:                        8.5    7.45  )-----------( 376      ( 12 Aug 2020 07:58 )             27.0     08-12    140  |  103  |  21  ----------------------------<  97  4.8   |  28  |  0.95    Ca    10.1      12 Aug 2020 07:58  Mg     2.1     08-12                RADIOLOGY & ADDITIONAL TESTS:  < from: Xray Abdomen 1 View Portable, IMMEDIATE (08.11.20 @ 18:24) >  XR ABDOMEN PORTABLE IMMED 1V      < end of copied text >  < from: Xray Abdomen 1 View Portable, IMMEDIATE (08.11.20 @ 18:24) >  Nonobstructive bowel gas pattern with moderate amount of stool.      < end of copied text >    Imaging Personally Reviewed:    Consultant(s) Notes Reviewed:      Care Discussed with Consultants/Other Providers:

## 2020-08-12 NOTE — PROGRESS NOTE ADULT - PROBLEM SELECTOR PLAN 1
-Persistent sensory loss and weakness of LE. Unable to ambulate. Intrinsic factor Ab indeterminant.  - gastrin level 3  -MMM and anti-parietal cell Ab elevated  -F/u path report from EGD.  -Continue w/ B12 1000mcg IM daily.   -PT rec rehab, but pt declined rehab, spoke with daughter Fallon on 8/11, will d/c home once pt has bm,   d/c today, d/c planning time spent in coordination 40 mts ( preparing d/c summary and plan)  - Neurology consulted. appreciate recs

## 2020-08-12 NOTE — PROGRESS NOTE ADULT - PROBLEM SELECTOR PLAN 4
miralax, senna and dulcolax supp, still with no bm, will give smog enema today, once pt has bm can be d/c home  abdominal xray with nonobstructive bowel gas pattern with moderate amount of stool.

## 2020-08-13 VITALS — SYSTOLIC BLOOD PRESSURE: 132 MMHG | HEART RATE: 80 BPM | DIASTOLIC BLOOD PRESSURE: 77 MMHG

## 2020-08-13 LAB
GLUCOSE BLDC GLUCOMTR-MCNC: 102 MG/DL — HIGH (ref 70–99)
GLUCOSE BLDC GLUCOMTR-MCNC: 115 MG/DL — HIGH (ref 70–99)
GLUCOSE BLDC GLUCOMTR-MCNC: 93 MG/DL — SIGNIFICANT CHANGE UP (ref 70–99)

## 2020-08-13 PROCEDURE — 99239 HOSP IP/OBS DSCHRG MGMT >30: CPT

## 2020-08-13 RX ORDER — SIMVASTATIN 20 MG/1
1 TABLET, FILM COATED ORAL
Qty: 0 | Refills: 0 | DISCHARGE

## 2020-08-13 RX ORDER — FLUTICASONE PROPIONATE AND SALMETEROL 50; 250 UG/1; UG/1
1 POWDER ORAL; RESPIRATORY (INHALATION)
Qty: 1 | Refills: 0
Start: 2020-08-13 | End: 2020-09-11

## 2020-08-13 RX ORDER — SIMVASTATIN 20 MG/1
1 TABLET, FILM COATED ORAL
Qty: 30 | Refills: 0
Start: 2020-08-13 | End: 2020-09-11

## 2020-08-13 RX ORDER — POLYETHYLENE GLYCOL 3350 17 G/17G
17 POWDER, FOR SOLUTION ORAL
Qty: 510 | Refills: 0
Start: 2020-08-13 | End: 2020-09-11

## 2020-08-13 RX ORDER — ALBUTEROL 90 UG/1
2 AEROSOL, METERED ORAL
Qty: 1 | Refills: 0
Start: 2020-08-13 | End: 2020-09-11

## 2020-08-13 RX ORDER — APIXABAN 2.5 MG/1
5 TABLET, FILM COATED ORAL EVERY 12 HOURS
Refills: 0 | Status: DISCONTINUED | OUTPATIENT
Start: 2020-08-13 | End: 2020-08-13

## 2020-08-13 RX ORDER — LEVOTHYROXINE SODIUM 125 MCG
1 TABLET ORAL
Qty: 30 | Refills: 0
Start: 2020-08-13 | End: 2020-09-11

## 2020-08-13 RX ORDER — NIFEDIPINE 30 MG
1 TABLET, EXTENDED RELEASE 24 HR ORAL
Qty: 30 | Refills: 0
Start: 2020-08-13 | End: 2020-09-11

## 2020-08-13 RX ORDER — APIXABAN 2.5 MG/1
1 TABLET, FILM COATED ORAL
Qty: 0 | Refills: 0 | DISCHARGE
Start: 2020-08-13

## 2020-08-13 RX ORDER — ASPIRIN/CALCIUM CARB/MAGNESIUM 324 MG
1 TABLET ORAL
Qty: 30 | Refills: 0
Start: 2020-08-13 | End: 2020-09-11

## 2020-08-13 RX ADMIN — NYSTATIN CREAM 1 APPLICATION(S): 100000 CREAM TOPICAL at 05:02

## 2020-08-13 RX ADMIN — Medication 30 MILLIGRAM(S): at 05:01

## 2020-08-13 RX ADMIN — APIXABAN 5 MILLIGRAM(S): 2.5 TABLET, FILM COATED ORAL at 17:29

## 2020-08-13 RX ADMIN — APIXABAN 10 MILLIGRAM(S): 2.5 TABLET, FILM COATED ORAL at 05:01

## 2020-08-13 RX ADMIN — POLYETHYLENE GLYCOL 3350 17 GRAM(S): 17 POWDER, FOR SOLUTION ORAL at 13:11

## 2020-08-13 RX ADMIN — Medication 20 MILLIGRAM(S): at 17:29

## 2020-08-13 RX ADMIN — BUDESONIDE AND FORMOTEROL FUMARATE DIHYDRATE 2 PUFF(S): 160; 4.5 AEROSOL RESPIRATORY (INHALATION) at 08:39

## 2020-08-13 RX ADMIN — PREGABALIN 1000 MICROGRAM(S): 225 CAPSULE ORAL at 17:29

## 2020-08-13 RX ADMIN — Medication 650 MILLIGRAM(S): at 13:12

## 2020-08-13 RX ADMIN — SENNA PLUS 1 TABLET(S): 8.6 TABLET ORAL at 13:11

## 2020-08-13 RX ADMIN — Medication 650 MILLIGRAM(S): at 13:58

## 2020-08-13 RX ADMIN — NYSTATIN CREAM 1 APPLICATION(S): 100000 CREAM TOPICAL at 17:29

## 2020-08-13 RX ADMIN — Medication 20 MILLIGRAM(S): at 05:01

## 2020-08-13 RX ADMIN — Medication 81 MILLIGRAM(S): at 13:11

## 2020-08-13 RX ADMIN — Medication 125 MICROGRAM(S): at 05:02

## 2020-08-13 NOTE — PROGRESS NOTE ADULT - PROBLEM SELECTOR PLAN 1
-Persistent sensory loss and weakness of LE. Unable to ambulate. Intrinsic factor Ab indeterminant.  - gastrin level 3  -MMM and anti-parietal cell Ab elevated  -F/u path report from EGD.  -Continue w/ B12 1000mcg IM daily.   -PT rec rehab, but pt declined rehab, spoke with daughter Fallon on 8/11,   d/c today, d/c planning time spent in coordination 40 mts ( preparing d/c summary and plan)  - Neurology consulted. appreciate recs -Persistent sensory loss and weakness of LE. Unable to ambulate. Intrinsic factor Ab indeterminant.  - gastrin level 3  -MMM and anti-parietal cell Ab elevated  -F/u path report from EGD. Not ready at the time of d/c  -Continue w/ B12 1000mcg IM daily.   -PT rec rehab, but pt declined rehab, spoke with daughter Fallon on 8/11,   d/c today, d/c planning time spent in coordination 40 mts ( preparing d/c summary and plan)  - Neurology consulted. appreciate recs

## 2020-08-13 NOTE — PROGRESS NOTE ADULT - REASON FOR ADMISSION
Weakness, inability to ambulate

## 2020-08-13 NOTE — PROGRESS NOTE ADULT - SUBJECTIVE AND OBJECTIVE BOX
Patient is a 75y old  Female who presents with a chief complaint of Weakness, inability to ambulate (12 Aug 2020 13:57)      SUBJECTIVE / OVERNIGHT EVENTS: Pt seen and examined at 10:45am, no overnight events, pt did not leave yesterday as daughter was unable to be contacted, for her to be picked up, today pt says she will come at 4pm after her work to pick her up. Pt is sitting in a chair, comfortable, no complaints, had bm yesterday evening.  No other new issues reported.        MEDICATIONS  (STANDING):  apixaban 5 milliGRAM(s) Oral every 12 hours  aspirin enteric coated 81 milliGRAM(s) Oral daily  budesonide 160 MICROgram(s)/formoterol 4.5 MICROgram(s) Inhaler 2 Puff(s) Inhalation two times a day  cyanocobalamin Injectable 1000 MICROGram(s) IntraMuscular daily  dextrose 5%. 1000 milliLiter(s) (50 mL/Hr) IV Continuous <Continuous>  dextrose 50% Injectable 12.5 Gram(s) IV Push once  dextrose 50% Injectable 25 Gram(s) IV Push once  dextrose 50% Injectable 25 Gram(s) IV Push once  enalapril 20 milliGRAM(s) Oral two times a day  insulin lispro (HumaLOG) corrective regimen sliding scale   SubCutaneous three times a day before meals  levothyroxine 125 MICROGram(s) Oral daily  NIFEdipine XL 30 milliGRAM(s) Oral daily  nystatin Powder 1 Application(s) Topical two times a day  polyethylene glycol 3350 17 Gram(s) Oral daily  polyethylene glycol 3350      senna 1 Tablet(s) Oral daily  simvastatin 20 milliGRAM(s) Oral at bedtime    MEDICATIONS  (PRN):  acetaminophen   Tablet .. 650 milliGRAM(s) Oral every 6 hours PRN Moderate Pain (4 - 6)  ALBUTerol    90 MICROgram(s) HFA Inhaler 2 Puff(s) Inhalation every 6 hours PRN Shortness of Breath and/or Wheezing  bisacodyl Suppository 10 milliGRAM(s) Rectal daily PRN Constipation  dextrose 40% Gel 15 Gram(s) Oral once PRN Blood Glucose LESS THAN 70 milliGRAM(s)/deciliter  glucagon  Injectable 1 milliGRAM(s) IntraMuscular once PRN Glucose LESS THAN 70 milligrams/deciliter      Vital Signs Last 24 Hrs  T(C): 36.4 (13 Aug 2020 12:11), Max: 36.8 (12 Aug 2020 20:00)  T(F): 97.5 (13 Aug 2020 12:11), Max: 98.3 (12 Aug 2020 20:00)  HR: 54 (13 Aug 2020 12:11) (54 - 82)  BP: 105/69 (13 Aug 2020 12:11) (105/69 - 132/77)  BP(mean): --  RR: 18 (13 Aug 2020 12:11) (17 - 18)  SpO2: 100% (13 Aug 2020 12:11) (98% - 100%)  CAPILLARY BLOOD GLUCOSE      POCT Blood Glucose.: 93 mg/dL (13 Aug 2020 12:08)  POCT Blood Glucose.: 102 mg/dL (13 Aug 2020 08:23)  POCT Blood Glucose.: 120 mg/dL (12 Aug 2020 22:18)  POCT Blood Glucose.: 99 mg/dL (12 Aug 2020 17:25)    I&O's Summary      PHYSICAL EXAM:  GENERAL: NAD, well-developed  CHEST/LUNG: Clear to auscultation bilaterally; No wheeze  HEART: Regular rate and rhythm  ABDOMEN: Soft, Nontender, Nondistended  EXTREMITIES:  no LE edema  PSYCH: Calm  NEUROLOGY: AAOx3  SKIN: + vitiligo    LABS:                        8.5    7.45  )-----------( 376      ( 12 Aug 2020 07:58 )             27.0     08-12    140  |  103  |  21  ----------------------------<  97  4.8   |  28  |  0.95    Ca    10.1      12 Aug 2020 07:58  Mg     2.1     08-12                RADIOLOGY & ADDITIONAL TESTS:    Imaging Personally Reviewed:    Consultant(s) Notes Reviewed:      Care Discussed with Consultants/Other Providers:

## 2020-08-20 ENCOUNTER — INPATIENT (INPATIENT)
Facility: HOSPITAL | Age: 75
LOS: 1 days | Discharge: HOME HEALTH | End: 2020-08-22
Attending: INTERNAL MEDICINE | Admitting: INTERNAL MEDICINE
Payer: MEDICARE

## 2020-08-20 VITALS
HEART RATE: 54 BPM | DIASTOLIC BLOOD PRESSURE: 66 MMHG | RESPIRATION RATE: 17 BRPM | OXYGEN SATURATION: 99 % | SYSTOLIC BLOOD PRESSURE: 145 MMHG | TEMPERATURE: 99 F

## 2020-08-20 DIAGNOSIS — G62.9 POLYNEUROPATHY, UNSPECIFIED: ICD-10-CM

## 2020-08-20 DIAGNOSIS — R42 DIZZINESS AND GIDDINESS: ICD-10-CM

## 2020-08-20 DIAGNOSIS — E03.9 HYPOTHYROIDISM, UNSPECIFIED: ICD-10-CM

## 2020-08-20 DIAGNOSIS — I82.403 ACUTE EMBOLISM AND THROMBOSIS OF UNSPECIFIED DEEP VEINS OF LOWER EXTREMITY, BILATERAL: ICD-10-CM

## 2020-08-20 DIAGNOSIS — Z98.890 OTHER SPECIFIED POSTPROCEDURAL STATES: Chronic | ICD-10-CM

## 2020-08-20 DIAGNOSIS — Z29.9 ENCOUNTER FOR PROPHYLACTIC MEASURES, UNSPECIFIED: ICD-10-CM

## 2020-08-20 DIAGNOSIS — E53.8 DEFICIENCY OF OTHER SPECIFIED B GROUP VITAMINS: ICD-10-CM

## 2020-08-20 DIAGNOSIS — D47.3 ESSENTIAL (HEMORRHAGIC) THROMBOCYTHEMIA: ICD-10-CM

## 2020-08-20 DIAGNOSIS — E11.40 TYPE 2 DIABETES MELLITUS WITH DIABETIC NEUROPATHY, UNSPECIFIED: ICD-10-CM

## 2020-08-20 DIAGNOSIS — R20.0 ANESTHESIA OF SKIN: ICD-10-CM

## 2020-08-20 LAB
ALBUMIN SERPL ELPH-MCNC: 4.1 G/DL — SIGNIFICANT CHANGE UP (ref 3.3–5)
ALP SERPL-CCNC: 67 U/L — SIGNIFICANT CHANGE UP (ref 40–120)
ALT FLD-CCNC: 13 U/L — SIGNIFICANT CHANGE UP (ref 4–33)
ANION GAP SERPL CALC-SCNC: 11 MMO/L — SIGNIFICANT CHANGE UP (ref 7–14)
APPEARANCE UR: CLEAR — SIGNIFICANT CHANGE UP
APTT BLD: 36.4 SEC — HIGH (ref 27–36.3)
AST SERPL-CCNC: 21 U/L — SIGNIFICANT CHANGE UP (ref 4–32)
BACTERIA # UR AUTO: SIGNIFICANT CHANGE UP
BASOPHILS # BLD AUTO: 0.17 K/UL — SIGNIFICANT CHANGE UP (ref 0–0.2)
BASOPHILS NFR BLD AUTO: 1.5 % — SIGNIFICANT CHANGE UP (ref 0–2)
BILIRUB SERPL-MCNC: 0.8 MG/DL — SIGNIFICANT CHANGE UP (ref 0.2–1.2)
BILIRUB UR-MCNC: NEGATIVE — SIGNIFICANT CHANGE UP
BLOOD UR QL VISUAL: NEGATIVE — SIGNIFICANT CHANGE UP
BUN SERPL-MCNC: 21 MG/DL — SIGNIFICANT CHANGE UP (ref 7–23)
CALCIUM SERPL-MCNC: 11.1 MG/DL — HIGH (ref 8.4–10.5)
CHLORIDE SERPL-SCNC: 103 MMOL/L — SIGNIFICANT CHANGE UP (ref 98–107)
CK SERPL-CCNC: 35 U/L — SIGNIFICANT CHANGE UP (ref 25–170)
CO2 SERPL-SCNC: 25 MMOL/L — SIGNIFICANT CHANGE UP (ref 22–31)
COLOR SPEC: SIGNIFICANT CHANGE UP
CREAT SERPL-MCNC: 0.99 MG/DL — SIGNIFICANT CHANGE UP (ref 0.5–1.3)
EOSINOPHIL # BLD AUTO: 0.49 K/UL — SIGNIFICANT CHANGE UP (ref 0–0.5)
EOSINOPHIL NFR BLD AUTO: 4.3 % — SIGNIFICANT CHANGE UP (ref 0–6)
GLUCOSE SERPL-MCNC: 88 MG/DL — SIGNIFICANT CHANGE UP (ref 70–99)
GLUCOSE UR-MCNC: NEGATIVE — SIGNIFICANT CHANGE UP
HCT VFR BLD CALC: 31.5 % — LOW (ref 34.5–45)
HGB BLD-MCNC: 9.5 G/DL — LOW (ref 11.5–15.5)
IMM GRANULOCYTES NFR BLD AUTO: 0.3 % — SIGNIFICANT CHANGE UP (ref 0–1.5)
INR BLD: 1.27 — HIGH (ref 0.88–1.16)
KETONES UR-MCNC: NEGATIVE — SIGNIFICANT CHANGE UP
LEUKOCYTE ESTERASE UR-ACNC: SIGNIFICANT CHANGE UP
LYMPHOCYTES # BLD AUTO: 19 % — SIGNIFICANT CHANGE UP (ref 13–44)
LYMPHOCYTES # BLD AUTO: 2.14 K/UL — SIGNIFICANT CHANGE UP (ref 1–3.3)
MCHC RBC-ENTMCNC: 30.2 % — LOW (ref 32–36)
MCHC RBC-ENTMCNC: 36.8 PG — HIGH (ref 27–34)
MCV RBC AUTO: 122.1 FL — HIGH (ref 80–100)
MONOCYTES # BLD AUTO: 0.54 K/UL — SIGNIFICANT CHANGE UP (ref 0–0.9)
MONOCYTES NFR BLD AUTO: 4.8 % — SIGNIFICANT CHANGE UP (ref 2–14)
NEUTROPHILS # BLD AUTO: 7.9 K/UL — HIGH (ref 1.8–7.4)
NEUTROPHILS NFR BLD AUTO: 70.1 % — SIGNIFICANT CHANGE UP (ref 43–77)
NITRITE UR-MCNC: NEGATIVE — SIGNIFICANT CHANGE UP
NRBC # FLD: 0 K/UL — SIGNIFICANT CHANGE UP (ref 0–0)
PH UR: 6.5 — SIGNIFICANT CHANGE UP (ref 5–8)
PLATELET # BLD AUTO: 723 K/UL — HIGH (ref 150–400)
PMV BLD: 11.1 FL — SIGNIFICANT CHANGE UP (ref 7–13)
POTASSIUM SERPL-MCNC: 4.5 MMOL/L — SIGNIFICANT CHANGE UP (ref 3.5–5.3)
POTASSIUM SERPL-SCNC: 4.5 MMOL/L — SIGNIFICANT CHANGE UP (ref 3.5–5.3)
PROT SERPL-MCNC: 7.9 G/DL — SIGNIFICANT CHANGE UP (ref 6–8.3)
PROT UR-MCNC: SIGNIFICANT CHANGE UP
PROTHROM AB SERPL-ACNC: 14.4 SEC — HIGH (ref 10.6–13.6)
RBC # BLD: 2.58 M/UL — LOW (ref 3.8–5.2)
RBC # FLD: 17.1 % — HIGH (ref 10.3–14.5)
SARS-COV-2 RNA SPEC QL NAA+PROBE: SIGNIFICANT CHANGE UP
SODIUM SERPL-SCNC: 139 MMOL/L — SIGNIFICANT CHANGE UP (ref 135–145)
SP GR SPEC: 1.01 — SIGNIFICANT CHANGE UP (ref 1–1.04)
SQUAMOUS # UR AUTO: SIGNIFICANT CHANGE UP
T3 SERPL-MCNC: 65.3 NG/DL — LOW (ref 80–200)
T4 AB SER-ACNC: 5.37 UG/DL — SIGNIFICANT CHANGE UP (ref 5.1–13)
T4 FREE SERPL-MCNC: 0.8 NG/DL — LOW (ref 0.9–1.8)
TSH SERPL-MCNC: 13.88 UIU/ML — HIGH (ref 0.27–4.2)
UROBILINOGEN FLD QL: NORMAL — SIGNIFICANT CHANGE UP
WBC # BLD: 11.27 K/UL — HIGH (ref 3.8–10.5)
WBC # FLD AUTO: 11.27 K/UL — HIGH (ref 3.8–10.5)
WBC UR QL: SIGNIFICANT CHANGE UP (ref 0–?)

## 2020-08-20 PROCEDURE — 99285 EMERGENCY DEPT VISIT HI MDM: CPT | Mod: 25

## 2020-08-20 PROCEDURE — 36000 PLACE NEEDLE IN VEIN: CPT

## 2020-08-20 PROCEDURE — 36410 VNPNXR 3YR/> PHY/QHP DX/THER: CPT

## 2020-08-20 PROCEDURE — 71046 X-RAY EXAM CHEST 2 VIEWS: CPT | Mod: 26

## 2020-08-20 PROCEDURE — 76937 US GUIDE VASCULAR ACCESS: CPT | Mod: 26

## 2020-08-20 PROCEDURE — 93010 ELECTROCARDIOGRAM REPORT: CPT

## 2020-08-20 PROCEDURE — 99223 1ST HOSP IP/OBS HIGH 75: CPT | Mod: GC

## 2020-08-20 RX ORDER — GLUCAGON INJECTION, SOLUTION 0.5 MG/.1ML
1 INJECTION, SOLUTION SUBCUTANEOUS ONCE
Refills: 0 | Status: DISCONTINUED | OUTPATIENT
Start: 2020-08-20 | End: 2020-08-22

## 2020-08-20 RX ORDER — PREGABALIN 225 MG/1
1000 CAPSULE ORAL DAILY
Refills: 0 | Status: DISCONTINUED | OUTPATIENT
Start: 2020-08-20 | End: 2020-08-20

## 2020-08-20 RX ORDER — GABAPENTIN 400 MG/1
300 CAPSULE ORAL
Refills: 0 | Status: DISCONTINUED | OUTPATIENT
Start: 2020-08-20 | End: 2020-08-21

## 2020-08-20 RX ORDER — DEXTROSE 50 % IN WATER 50 %
12.5 SYRINGE (ML) INTRAVENOUS ONCE
Refills: 0 | Status: DISCONTINUED | OUTPATIENT
Start: 2020-08-20 | End: 2020-08-22

## 2020-08-20 RX ORDER — DEXTROSE 50 % IN WATER 50 %
25 SYRINGE (ML) INTRAVENOUS ONCE
Refills: 0 | Status: DISCONTINUED | OUTPATIENT
Start: 2020-08-20 | End: 2020-08-22

## 2020-08-20 RX ORDER — APIXABAN 2.5 MG/1
5 TABLET, FILM COATED ORAL EVERY 12 HOURS
Refills: 0 | Status: DISCONTINUED | OUTPATIENT
Start: 2020-08-20 | End: 2020-08-22

## 2020-08-20 RX ORDER — NIFEDIPINE 30 MG
30 TABLET, EXTENDED RELEASE 24 HR ORAL DAILY
Refills: 0 | Status: DISCONTINUED | OUTPATIENT
Start: 2020-08-20 | End: 2020-08-22

## 2020-08-20 RX ORDER — DEXTROSE 50 % IN WATER 50 %
15 SYRINGE (ML) INTRAVENOUS ONCE
Refills: 0 | Status: DISCONTINUED | OUTPATIENT
Start: 2020-08-20 | End: 2020-08-22

## 2020-08-20 RX ORDER — SODIUM CHLORIDE 9 MG/ML
1000 INJECTION INTRAMUSCULAR; INTRAVENOUS; SUBCUTANEOUS ONCE
Refills: 0 | Status: COMPLETED | OUTPATIENT
Start: 2020-08-20 | End: 2020-08-20

## 2020-08-20 RX ORDER — ACETAMINOPHEN 500 MG
650 TABLET ORAL ONCE
Refills: 0 | Status: COMPLETED | OUTPATIENT
Start: 2020-08-20 | End: 2020-08-20

## 2020-08-20 RX ORDER — PREGABALIN 225 MG/1
1000 CAPSULE ORAL DAILY
Refills: 0 | Status: DISCONTINUED | OUTPATIENT
Start: 2020-08-20 | End: 2020-08-22

## 2020-08-20 RX ORDER — LEVOTHYROXINE SODIUM 125 MCG
150 TABLET ORAL DAILY
Refills: 0 | Status: DISCONTINUED | OUTPATIENT
Start: 2020-08-20 | End: 2020-08-22

## 2020-08-20 RX ORDER — INSULIN LISPRO 100/ML
VIAL (ML) SUBCUTANEOUS
Refills: 0 | Status: DISCONTINUED | OUTPATIENT
Start: 2020-08-20 | End: 2020-08-22

## 2020-08-20 RX ORDER — SODIUM CHLORIDE 9 MG/ML
1000 INJECTION, SOLUTION INTRAVENOUS
Refills: 0 | Status: DISCONTINUED | OUTPATIENT
Start: 2020-08-20 | End: 2020-08-22

## 2020-08-20 RX ORDER — SIMVASTATIN 20 MG/1
20 TABLET, FILM COATED ORAL AT BEDTIME
Refills: 0 | Status: DISCONTINUED | OUTPATIENT
Start: 2020-08-20 | End: 2020-08-22

## 2020-08-20 RX ORDER — INSULIN LISPRO 100/ML
VIAL (ML) SUBCUTANEOUS AT BEDTIME
Refills: 0 | Status: DISCONTINUED | OUTPATIENT
Start: 2020-08-20 | End: 2020-08-22

## 2020-08-20 RX ORDER — NIFEDIPINE 30 MG
30 TABLET, EXTENDED RELEASE 24 HR ORAL DAILY
Refills: 0 | Status: DISCONTINUED | OUTPATIENT
Start: 2020-08-20 | End: 2020-08-20

## 2020-08-20 RX ADMIN — SODIUM CHLORIDE 1000 MILLILITER(S): 9 INJECTION INTRAMUSCULAR; INTRAVENOUS; SUBCUTANEOUS at 11:02

## 2020-08-20 RX ADMIN — APIXABAN 5 MILLIGRAM(S): 2.5 TABLET, FILM COATED ORAL at 17:53

## 2020-08-20 RX ADMIN — Medication 650 MILLIGRAM(S): at 11:01

## 2020-08-20 RX ADMIN — PREGABALIN 1000 MICROGRAM(S): 225 CAPSULE ORAL at 17:53

## 2020-08-20 RX ADMIN — SIMVASTATIN 20 MILLIGRAM(S): 20 TABLET, FILM COATED ORAL at 22:07

## 2020-08-20 RX ADMIN — GABAPENTIN 300 MILLIGRAM(S): 400 CAPSULE ORAL at 17:53

## 2020-08-20 NOTE — H&P ADULT - NSHPPHYSICALEXAM_GEN_ALL_CORE
VITALS:     GENERAL: NAD, lying in bed comfortably  HEAD:  Atraumatic, Normocephalic  EYES: EOMI, PERRLA, conjunctiva and sclera clear  ENT: Moist mucous membranes  NECK: Supple, No JVD  CHEST/LUNG: Clear to auscultation bilaterally; No rales, rhonchi, wheezing, or rubs. Unlabored respirations  HEART: Regular rate and rhythm; No murmurs, rubs, or gallops  ABDOMEN: Bowel sounds present; Soft, Nontender, Nondistended. No hepatomegally  EXTREMITIES:  2+ Peripheral Pulses, brisk capillary refill. No clubbing, cyanosis, or edema  NERVOUS SYSTEM:  Alert & Oriented X3, speech clear. No deficits   MSK: FROM all 4 extremities, full and equal strength  SKIN: No rashes or lesions ICU Vital Signs Last 24 Hrs  T(C): 37.1 (20 Aug 2020 18:16), Max: 37.1 (20 Aug 2020 07:47)  T(F): 98.7 (20 Aug 2020 18:16), Max: 98.7 (20 Aug 2020 07:47)  HR: 68 (20 Aug 2020 18:16) (54 - 68)  BP: 138/86 (20 Aug 2020 18:16) (138/86 - 145/66)  RR: 19 (20 Aug 2020 18:16) (17 - 19)  SpO2: 100% (20 Aug 2020 18:16) (99% - 100%)    GENERAL: NAD  HEAD:  Atraumatic, Normocephalic  EYES: EOMI, conjunctiva and sclera clear  ENT: Moist mucous membranes  NECK: Supple, No JVD  CHEST/LUNG: Clear to auscultation bilaterally; No rales, rhonchi, wheezing, or rubs. Unlabored respirations  HEART: Regular rate and rhythm; No murmurs, rubs, or gallops  ABDOMEN: Bowel sounds present; Soft, Nontender, Nondistended.  EXTREMITIES:  2+ Peripheral Pulses, brisk capillary refill. No clubbing, cyanosis, or edema  NERVOUS SYSTEM: Alert & Oriented X3, speech clear. Full ROM, 5/5 strength equal bilateral ext. No loss of sensation seen on exam  MSK: FROM all 4 extremities, full and equal strength. No calf or thigh tenderness.  SKIN: +++Vitiligo seen on bilateral hands

## 2020-08-20 NOTE — H&P ADULT - NSHPSOCIALHISTORY_GEN_ALL_CORE
Never smoker/drinker/illicit drug user  Lives with  and daughter and grandchildren    PCP is Dr. Marion Wallace

## 2020-08-20 NOTE — ED PROVIDER NOTE - CRANIAL NERVE AND PUPILLARY EXAM
cranial nerves 2-12 intact/5/5  strength, symmetrical mild decreased strength in BLE, possibly due to pain

## 2020-08-20 NOTE — H&P ADULT - PROBLEM SELECTOR PLAN 3
Pt has right leg DVT above and below the knee, and a left leg DVT above the knee. Pt has no SOB/hypoxia.  Consider hematologic disorders causing thrombocytosis in the setting of hypercoagulability causing spontaneous bilateral DVT.  -cont Eliquis

## 2020-08-20 NOTE — H&P ADULT - ASSESSMENT
75F PMH of MI 25 years ago, DM, HTN, recent admission for B12 deficiency with LE neurological symptoms and found to also have bilateral DVTs now started on Eliquis presenting with diffuse body tingling, numbness.    #Neuropathy  Pt has hx of B12 deficiency. She has macrocytic anemia  -Start B12 and folate  -Reactive thrombocytosis is likely due to anemia    #Bilateral DVT in lower extremities  Pt diagnosed with new DVTs bilaterally  Pt walks with a walker at home unassisted  -PT 14.4, INR 1.27, and aPTT 36.4 within therapeutic range    #Hypothyroidism  Pt has longstanding hypothyroidism on Levothyroxine  -TSH elevated at 13.88    #DM2  Pt has long standing diabetes, not on insulin at home    #Proph  -DVT proph: pt is on Elliquis  -Diet: DASH, carb consistent 75F PMH of MI 25 years ago, DM, HTN, recent admission for B12 deficiency with LE neurological symptoms, and found to also have bilateral DVTs prior to d/c, started on Eliquis (30 day supply provided by the hospital), presented with diffuse intermittent numbness and tingling in her chest, neck, and bilateral arms, as well as her bilateral lower extremities. She also felt very lethargic and had bilateral tremors associated with feeling anxious, felt lightheaded while walking and when getting up from a sitting position. She denies chest pain, SOB, abdominal pain, nausea/ vomiting fevers. She also complains of feeling chills and having constipation with hard bowel movements.  PMH: DM2, HTN, HLD, psoriasis  PSH: hernia repairs  SH: denies all smoking/drugs/alcohol, lives with , daughter, and grandchildren.   FH:  Allergies: none  Meds:     #Thrombocytosis  Pt has new onset thrombocytosis to 723, not seen before previously discharged on 8/11  -May be due to bilateral DVTs  -anemia/blood loss  -infection  -malignancy/rheum/trauma, reactions to medicines  -perform peripheral blood smear  -Autonomous thrombocytosis: causing clotting: essential thrombocythemia polycythemia vera, primary myelofibrosis, CML, myelodysplastic syndromes, AML  -recheck CBC tomorrow, if ot goes away, ok  -If persistent, consult heme, testing for JAK2, CALR, MPR, BCR-ABL  -iron studies tomorrow    #Neuropathy  Pt has hx of B12 deficiency. She has macrocytic anemia  -Start B12 1000 oral daily, and folate  -r/o pernicious anemia: indeterminate intrinsic factor on Aug 5th  -B12 level upon discharge was high  -check B12    #Bilateral DVT in lower extremities  Pt diagnosed with new DVTs bilaterally  Pt walks with a walker at home unassisted  -cont eliquis    #Hypothyroidism  Pt has longstanding hypothyroidism on Levothyroxine  -TSH elevated at 13.88  -Home dose levothyroxine is 125mcg, started on 150mcg    #DM2  Pt has long standing diabetes, not on insulin at home  -ISS    #Proph  -DVT proph: pt is on Elliquis  -Diet: DASH, carb consistent 75F PMH of MI 25 years ago, DM, HTN, recent admission for B12 deficiency with LE neurological symptoms, and found to also have bilateral DVTs prior to d/c, started on Eliquis (30 day supply provided by the hospital), presented with diffuse neuropathy concerning for vitamin B12 deficiency, as well as thrombocytosis of 723.    #Thrombocytosis  Pt has new onset thrombocytosis to 723, not seen before previously discharged on 8/11  -May be due to bilateral DVTs  -anemia/blood loss, obtain iron studies tomorrow  -infectious causes could be possible given slightly elevated WBC. Negative Covid, UA, CXR.  -perform peripheral blood smear  -recheck CBC tomorrow, if not normalized, consider heme/onc consult with testing for JAK2, CALR, MPR, BCR-ABL  -Suspect autonomous thrombocytosis: causing clotting: essential thrombocythemia polycythemia vera, primary myelofibrosis, CML, myelodysplastic syndromes, AML  -malignancy/rheumatologic causes less likely    #Vitamin B12 deficiency  Pt has hx of B12 deficiency with macrocytic anemia  -Start B12 1000 IM daily, and folate  -Pt likely has pernicious anemia with positive parietal cell antibodies and an indeterminate intrinsic factor on Aug 5th  -B12 level upon discharge from last admission on 8/11 was high    #Bilateral DVT in lower extremities  Pt has right leg DVT above and below the knee, and a left leg DVT above the knee. Pt has no SOB/hypoxia.  Consider hematologic disorders causing thrombocytosis in the setting of hypercoagulability causing spontaneous bilateral DVT.  -cont Eliquis    #Hypothyroidism  Pt has longstanding hypothyroidism on Levothyroxine  -TSH elevated at 13.88  -Home dose levothyroxine is 125mcg, started on 150mcg    #DM2  Pt has long standing diabetes, not on insulin at home  -ISS    #Proph  -DVT proph: pt is on Elliquis  -Diet: DASH, carb consistent 75F PMH of MI 25 years ago, DM, HTN, recent admission for B12 deficiency with LE neurological symptoms, and found to also have bilateral DVTs prior to d/c, started on Eliquis (30 day supply provided by the hospital), presented with diffuse neuropathy concerning for vitamin B12 deficiency, as well as thrombocytosis of 723.

## 2020-08-20 NOTE — H&P ADULT - PROBLEM SELECTOR PLAN 2
Pt has hx of B12 deficiency with macrocytic anemia  -Start B12 1000 IM daily, and folate  -Pt likely has pernicious anemia with positive parietal cell antibodies and an indeterminate intrinsic factor on Aug 5th  -B12 level upon discharge from last admission on 8/11 was high

## 2020-08-20 NOTE — ED PROVIDER NOTE - OBJECTIVE STATEMENT
75F PMH DM, HTN, recent admission for b12 deficiency with LE neurological symptoms and found to also have bilateral DVTs now started on Eliquis presenting with diffuse body tingling, numbness. Legs still feel weak. Ambulating with walker at home. No new difficulties. Denies any fall, trauma, or fever/chills Discharged from [facility] 8/11/20. Saw PCP yesterday.

## 2020-08-20 NOTE — H&P ADULT - PROBLEM SELECTOR PLAN 8
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 Marion Wallace

## 2020-08-20 NOTE — H&P ADULT - HISTORY OF PRESENT ILLNESS
75F PMH DM, HTN, recent admission for b12 deficiency with LE neurological symptoms and found to also have bilateral DVTs now started on Eliquis presenting with diffuse body tingling, numbness. Legs still feel weak. Ambulating with walker at home. No new difficulties. Denies any fall, trauma, or fever/chills Discharged from [facility] 8/11/20. Saw PCP yesterday 75F PMH DM, HTN, recent admission for b12 deficiency with LE neurological symptoms and found to also have bilateral DVTs now started on Eliquis presenting with diffuse body tingling, numbness. Legs still feel weak. Ambulating with walker at home. No new difficulties. Denies any fall, trauma, or fever/chills Discharged from [facility] 8/11/20. 75F PMH of MI 25 years ago, DM, HTN, recent admission for B12 deficiency with LE neurological symptoms, and found to also have bilateral DVTs prior to d/c, started on Eliquis (30 day supply provided by the hospital), presented with diffuse intermittent numbness and tingling in her chest, neck, and bilateral arms, as well as her bilateral lower extremities. She also felt very lethargic and had bilateral tremors associated with feeling anxious, felt lightheaded while walking and when getting up from a sitting position. She denies chest pain, SOB, abdominal pain, nausea/ vomiting fevers. She also complains of feeling chills and having constipation with hard bowel movements.    ED course was significant for WBC of 11.27, Hgb 9.5 with elevated MCV of 122.1, and thrombocytosis of 723. PT/PTT/INR elevated at 14.4/36.4/1.27. CMP WNL. Ca borderline elevated 11.1. TSH is 13.88, T4 is 5.37. Chest XR shows cardiomegaly and osteopenia. Venous doppler from previous admission shows Right leg with DVT above and below the knee and Left leg with DVT above the knee. Pt received 1L NS and Tylenol for a mild headache.

## 2020-08-20 NOTE — H&P ADULT - PROBLEM SELECTOR PLAN 4
Pt has longstanding hypothyroidism on Levothyroxine  -TSH elevated at 13.88  -Home dose levothyroxine is 125mcg, started on 150mcg in the hospital

## 2020-08-20 NOTE — H&P ADULT - NSICDXPASTMEDICALHX_GEN_ALL_CORE_FT
PAST MEDICAL HISTORY:  Diabetes Type 2 diabetes    DVT (deep venous thrombosis), right Bilateral LE DVT    Essential hypertension     Hypothyroid     Psoriasis     Vitiligo

## 2020-08-20 NOTE — ED PROVIDER NOTE - ATTENDING CONTRIBUTION TO CARE
Pt was seen and evaluated by me. Pt is a 76 y/o female with PMHx of type 2 DM, HTN, b/l DVTs, and b12 deficiency who presented to the ED for generalized body tingling and numbness X days. Pt states over the past few days having generalized numbness and tingling. Pt states also having some dysuria. Pt denies any fever, chills, nausea, vomiting, SOB, chest pain, or abd pain. Denies any diarrhea. Pt was seen by PMD yesterday and was prescribed a new medication which pt has not yet started and unaware of name. Lungs CTA b/l. RRR. Abd soft, non-tender. No focal deficits. 5/5 b/l LE. No calf tenderness.   Concern for electrolyte abnormalities, B12 deficiency, anemia  Labs, EKG, CXR

## 2020-08-20 NOTE — ED PROVIDER NOTE - PROGRESS NOTE DETAILS
Haverty, PGY3- given new thrombocytosis and ongoing neuro sx will admit for further neurology/hematology work up. Dr. Carine Torres accepting.

## 2020-08-20 NOTE — H&P ADULT - NSHPLABSRESULTS_GEN_ALL_CORE
9.5    11.27 )-----------( 723      ( 20 Aug 2020 10:53 )             31.5           139  |  103  |  21  ----------------------------<  88  4.5   |  25  |  0.99    Ca    11.1<H>      20 Aug 2020 10:53    TPro  7.9  /  Alb  4.1  /  TBili  0.8  /  DBili  x   /  AST  21  /  ALT  13  /  AlkPhos  67  0820              Urinalysis Basic - ( 20 Aug 2020 11:37 )    Color: LT. YELLOW / Appearance: CLEAR / S.011 / pH: 6.5  Gluc: NEGATIVE / Ketone: NEGATIVE  / Bili: NEGATIVE / Urobili: NORMAL   Blood: NEGATIVE / Protein: TRACE / Nitrite: NEGATIVE   Leuk Esterase: TRACE / RBC: x / WBC 0-2   Sq Epi: OCC / Non Sq Epi: x / Bacteria: FEW        PT/INR - ( 20 Aug 2020 10:53 )   PT: 14.4 SEC;   INR: 1.27          PTT - ( 20 Aug 2020 10:53 )  PTT:36.4 SEC    Lactate Trend      CARDIAC MARKERS ( 20 Aug 2020 10:53 )  x     / x     / 35 u/L / x     / x            CAPILLARY BLOOD GLUCOSE      POCT Blood Glucose.: 97 mg/dL (20 Aug 2020 17:44)        Culture Results:   >100,000 CFU/ml Escherichia coli  50,000 - 99,000 CFU/mL Enterococcus faecalis ( @ 02:36)

## 2020-08-20 NOTE — ED ADULT TRIAGE NOTE - CHIEF COMPLAINT QUOTE
pt arrives w/ c/o tingling to her whole body. pt states she woke with her body feeling numb. pt also c/o dizziness pt denies any chest pain, nausea, vomiting, abdominal pain.

## 2020-08-20 NOTE — ED PROVIDER NOTE - CARE PLAN
Principal Discharge DX:	Numbness and tingling  Secondary Diagnosis:	Thrombocytosis  Secondary Diagnosis:	DVT (deep venous thrombosis)

## 2020-08-20 NOTE — ED ADULT NURSE NOTE - OBJECTIVE STATEMENT
Pt to bed 2. Alert and oriented x 3. Pt c/o body tingling and pain. Pt in no acute distress. Speaking normally. Moving all extremities. Ultrasound guided IV placed. Bloods drawn. Safety maintained. Will continue to monitor.

## 2020-08-20 NOTE — H&P ADULT - NSHPREVIEWOFSYSTEMS_GEN_ALL_CORE
REVIEW OF SYSTEMS:    CONSTITUTIONAL: No weakness, fevers or chills  EYES/ENT: No visual changes;  No vertigo or throat pain   NECK: No pain or stiffness  RESPIRATORY: No cough, wheezing, hemoptysis; No shortness of breath  CARDIOVASCULAR: No chest pain or palpitations  GASTROINTESTINAL: No abdominal or epigastric pain. No nausea, vomiting, or hematemesis; No diarrhea or constipation. No melena or hematochezia.  GENITOURINARY: No dysuria, frequency or hematuria  NEUROLOGICAL: No numbness or weakness  SKIN: No itching, rashes REVIEW OF SYSTEMS:    CONSTITUTIONAL: No weakness, fevers or chills  EYES/ENT: No visual changes  NECK: No pain or stiffness  RESPIRATORY: No cough, wheezing, hemoptysis; No shortness of breath  CARDIOVASCULAR: No chest pain or palpitations  GASTROINTESTINAL: No abdominal or epigastric pain. No nausea, vomiting, or hematemesis; No melena or hematochezia.  GENITOURINARY: No dysuria, frequency or hematuria  NEUROLOGICAL: +++ numbness tingling in bilateral upper and lower ext, chest, and especially in hands and feet.  SKIN: No itching, rashes REVIEW OF SYSTEMS:    CONSTITUTIONAL: ++Lightheadedness when getting up from seated position. No weakness, fevers or chills  EYES/ENT: No visual changes  NECK: No pain or stiffness  RESPIRATORY: No cough, wheezing, hemoptysis; No shortness of breath  CARDIOVASCULAR: No chest pain or palpitations  GASTROINTESTINAL: +++ constipation with hard BMs. No abdominal or epigastric pain. No nausea, vomiting, or hematemesis; No melena or hematochezia.  GENITOURINARY: No dysuria, frequency or hematuria  NEUROLOGICAL: +++ numbness tingling in bilateral upper and lower ext, chest, and especially in hands and feet.  SKIN: No itching, rashes

## 2020-08-20 NOTE — ED ADULT NURSE NOTE - NSIMPLEMENTINTERV_GEN_ALL_ED
Implemented All Fall with Harm Risk Interventions:  [place] to call system. Call bell, personal items and telephone within reach. Instruct patient to call for assistance. Room bathroom lighting operational. Non-slip footwear when patient is off stretcher. Physically safe environment: no spills, clutter or unnecessary equipment. Stretcher in lowest position, wheels locked, appropriate side rails in place. Provide visual cue, wrist band, yellow gown, etc. Monitor gait and stability. Monitor for mental status changes and reorient to person, place, and time. Review medications for side effects contributing to fall risk. Reinforce activity limits and safety measures with patient and family. Provide visual clues: red socks.

## 2020-08-20 NOTE — ED ADULT NURSE REASSESSMENT NOTE - NS ED NURSE REASSESS COMMENT FT1
Pt with stage 2 to sacrum in fold as well as a stage 2 to right side of inner buttocks. Wound cleansed and dressed.

## 2020-08-20 NOTE — H&P ADULT - ATTENDING COMMENTS
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 As per patient, her gait has significantly improved since last hospitalization. She does  continue to report numbness/tingling and that appeared to be the reason for her presentation to the hospital. On arrival, labs were ntoable for mild leukocytosis w/ significant bump of platelet to 723. Pt was admitted for further eval.     As from previous hospitalization, discuss w/  neuro indicated that although B12 level has recovered, improvement of neuro deficits will take weeks/months. At this point, the concern is the degree of recovery the patient will expect after correcting her vitamin deficiency. Clinical exam shows pt has significantly improved since last hospitalization and she did not show any other major deficits besides paresthesia.     #B12 neuropathy  #Thrombocytosis  #Macrocytic anemia  #Atrophic gastritis  #BLE DVT    -Labs in AM.   -Etiology of thrombocytosis may be multifactorial. Depending on trend, may defer additional workup as outpatient.  -Will continue w/ IM B12 for now. Would be important to clarify whether or not pt has been receiving B12 after DC from last hospitalization.  -May need to consider additional supplementation (i.e. folate/iron) considering hx of atrophic gastritis. F/u labs. Further f/u by PCP/GI.     Rest of plan as discussed.

## 2020-08-20 NOTE — H&P ADULT - PROBLEM SELECTOR PLAN 6
Pt has numbness and tingling of bilateral upper and lower ext as well as chest  -See management for B12 deficiency above  -Pt is on bedrest and may ambulate with assistance  -Fall risk precautions

## 2020-08-20 NOTE — H&P ADULT - PROBLEM SELECTOR PLAN 1
Pt has new onset thrombocytosis to 723, not seen before previously discharged on 8/11  -May be due to bilateral DVTs  -anemia/blood loss, obtain iron studies tomorrow  -infectious causes could be possible given slightly elevated WBC. Negative Covid, UA, CXR.  -perform peripheral blood smear  -recheck CBC tomorrow, if not normalized, consider heme/onc consult with testing for JAK2, CALR, MPR, BCR-ABL  -Suspect autonomous thrombocytosis: causing clotting: essential thrombocythemia polycythemia vera, primary myelofibrosis, CML, myelodysplastic syndromes, AML  -malignancy/rheumatologic causes less likely

## 2020-08-20 NOTE — H&P ADULT - PROBLEM SELECTOR PLAN 7
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 Marion Wallace Pt feels lightheaded when getting up from seated position  -Obtain orthostatic vitals  -Pt s/p 1 L NS, regular diet, encourage PO fluids

## 2020-08-20 NOTE — ED PROVIDER NOTE - CLINICAL SUMMARY MEDICAL DECISION MAKING FREE TEXT BOX
Hannah, PGY3- 75F PMH DM, HTN, recent admission for b12 deficiency with LE neurological symptoms and found to also have bilateral DVTs now started on Eliquis presenting with diffuse body tingling, numbness. Legs still feel weak. Ambulating with walker at home. No new difficulties. Denies any fall, trauma, or fever/chills Discharged from [facility] 8/11/20. Saw PCP yesterday.  anxious but looks well, no marked sensory deficit, no lateralizing exam, vitals reassuring,  eval for electrolyte issues, suspect sx 2/2 to known neuropathy, no focal deficits to suggest CVA/ICH, not consistent with spinal cord syndrome

## 2020-08-21 LAB
ALBUMIN SERPL ELPH-MCNC: 3.3 G/DL — SIGNIFICANT CHANGE UP (ref 3.3–5)
ALP SERPL-CCNC: 57 U/L — SIGNIFICANT CHANGE UP (ref 40–120)
ALT FLD-CCNC: 10 U/L — SIGNIFICANT CHANGE UP (ref 4–33)
ANION GAP SERPL CALC-SCNC: 11 MMO/L — SIGNIFICANT CHANGE UP (ref 7–14)
AST SERPL-CCNC: 15 U/L — SIGNIFICANT CHANGE UP (ref 4–32)
BILIRUB SERPL-MCNC: 0.4 MG/DL — SIGNIFICANT CHANGE UP (ref 0.2–1.2)
BUN SERPL-MCNC: 18 MG/DL — SIGNIFICANT CHANGE UP (ref 7–23)
CALCIUM SERPL-MCNC: 10.5 MG/DL — SIGNIFICANT CHANGE UP (ref 8.4–10.5)
CHLORIDE SERPL-SCNC: 106 MMOL/L — SIGNIFICANT CHANGE UP (ref 98–107)
CO2 SERPL-SCNC: 26 MMOL/L — SIGNIFICANT CHANGE UP (ref 22–31)
CREAT SERPL-MCNC: 1.12 MG/DL — SIGNIFICANT CHANGE UP (ref 0.5–1.3)
CULTURE RESULTS: SIGNIFICANT CHANGE UP
FERRITIN SERPL-MCNC: 36.19 NG/ML — SIGNIFICANT CHANGE UP (ref 15–150)
FOLATE SERPL-MCNC: 3.8 NG/ML — LOW (ref 4.7–20)
GLUCOSE BLDC GLUCOMTR-MCNC: 111 MG/DL — HIGH (ref 70–99)
GLUCOSE BLDC GLUCOMTR-MCNC: 78 MG/DL — SIGNIFICANT CHANGE UP (ref 70–99)
GLUCOSE BLDC GLUCOMTR-MCNC: 89 MG/DL — SIGNIFICANT CHANGE UP (ref 70–99)
GLUCOSE SERPL-MCNC: 82 MG/DL — SIGNIFICANT CHANGE UP (ref 70–99)
HCT VFR BLD CALC: 26.3 % — LOW (ref 34.5–45)
HGB BLD-MCNC: 8.4 G/DL — LOW (ref 11.5–15.5)
IRON SATN MFR SERPL: 23 UG/DL — LOW (ref 30–160)
IRON SATN MFR SERPL: 311 UG/DL — SIGNIFICANT CHANGE UP (ref 140–530)
MAGNESIUM SERPL-MCNC: 2 MG/DL — SIGNIFICANT CHANGE UP (ref 1.6–2.6)
MCHC RBC-ENTMCNC: 31.9 % — LOW (ref 32–36)
MCHC RBC-ENTMCNC: 38.5 PG — HIGH (ref 27–34)
MCV RBC AUTO: 120.6 FL — HIGH (ref 80–100)
NRBC # FLD: 0 K/UL — SIGNIFICANT CHANGE UP (ref 0–0)
PHOSPHATE SERPL-MCNC: 3.2 MG/DL — SIGNIFICANT CHANGE UP (ref 2.5–4.5)
PLATELET # BLD AUTO: 590 K/UL — HIGH (ref 150–400)
PMV BLD: 11 FL — SIGNIFICANT CHANGE UP (ref 7–13)
POTASSIUM SERPL-MCNC: 4.6 MMOL/L — SIGNIFICANT CHANGE UP (ref 3.5–5.3)
POTASSIUM SERPL-SCNC: 4.6 MMOL/L — SIGNIFICANT CHANGE UP (ref 3.5–5.3)
PROT SERPL-MCNC: 6.6 G/DL — SIGNIFICANT CHANGE UP (ref 6–8.3)
RBC # BLD: 2.18 M/UL — LOW (ref 3.8–5.2)
RBC # FLD: 17.5 % — HIGH (ref 10.3–14.5)
SODIUM SERPL-SCNC: 143 MMOL/L — SIGNIFICANT CHANGE UP (ref 135–145)
SPECIMEN SOURCE: SIGNIFICANT CHANGE UP
UIBC SERPL-MCNC: 288.4 UG/DL — SIGNIFICANT CHANGE UP (ref 110–370)
VIT B12 SERPL-MCNC: > 2000 PG/ML — HIGH (ref 200–900)
WBC # BLD: 9.7 K/UL — SIGNIFICANT CHANGE UP (ref 3.8–10.5)
WBC # FLD AUTO: 9.7 K/UL — SIGNIFICANT CHANGE UP (ref 3.8–10.5)

## 2020-08-21 PROCEDURE — 99232 SBSQ HOSP IP/OBS MODERATE 35: CPT | Mod: GC

## 2020-08-21 RX ORDER — FOLIC ACID 0.8 MG
1 TABLET ORAL DAILY
Refills: 0 | Status: DISCONTINUED | OUTPATIENT
Start: 2020-08-21 | End: 2020-08-22

## 2020-08-21 RX ORDER — FERROUS SULFATE 325(65) MG
325 TABLET ORAL DAILY
Refills: 0 | Status: DISCONTINUED | OUTPATIENT
Start: 2020-08-21 | End: 2020-08-22

## 2020-08-21 RX ADMIN — SIMVASTATIN 20 MILLIGRAM(S): 20 TABLET, FILM COATED ORAL at 22:43

## 2020-08-21 RX ADMIN — APIXABAN 5 MILLIGRAM(S): 2.5 TABLET, FILM COATED ORAL at 06:58

## 2020-08-21 RX ADMIN — Medication 150 MICROGRAM(S): at 06:58

## 2020-08-21 RX ADMIN — Medication 325 MILLIGRAM(S): at 17:58

## 2020-08-21 RX ADMIN — Medication 1 MILLIGRAM(S): at 12:11

## 2020-08-21 RX ADMIN — Medication 30 MILLIGRAM(S): at 06:57

## 2020-08-21 RX ADMIN — GABAPENTIN 300 MILLIGRAM(S): 400 CAPSULE ORAL at 06:57

## 2020-08-21 RX ADMIN — PREGABALIN 1000 MICROGRAM(S): 225 CAPSULE ORAL at 12:04

## 2020-08-21 RX ADMIN — APIXABAN 5 MILLIGRAM(S): 2.5 TABLET, FILM COATED ORAL at 17:57

## 2020-08-21 NOTE — DIETITIAN INITIAL EVALUATION ADULT. - PHYSICAL APPEARANCE
other (specify) Nutrition focused physical exam conducted - found signs of malnutrition present:  Subcutaneous fat loss: [mod-severe] Orbital fat pads region, [moderate]Buccal fat region, [severe]Triceps region  Muscle wasting: [moderate]Temples region, [severe]Clavicle region, [moderate]Calf region

## 2020-08-21 NOTE — DIETITIAN INITIAL EVALUATION ADULT. - PERTINENT MEDS FT
MEDICATIONS  (STANDING):  apixaban 5 milliGRAM(s) Oral every 12 hours  cyanocobalamin Injectable 1000 MICROGram(s) IntraMuscular daily  dextrose 5%. 1000 milliLiter(s) (50 mL/Hr) IV Continuous <Continuous>  dextrose 50% Injectable 12.5 Gram(s) IV Push once  dextrose 50% Injectable 25 Gram(s) IV Push once  dextrose 50% Injectable 25 Gram(s) IV Push once  ferrous    sulfate 325 milliGRAM(s) Oral daily  folic acid 1 milliGRAM(s) Oral daily  gabapentin 300 milliGRAM(s) Oral two times a day  insulin lispro (HumaLOG) corrective regimen sliding scale   SubCutaneous three times a day before meals  insulin lispro (HumaLOG) corrective regimen sliding scale   SubCutaneous at bedtime  levothyroxine 150 MICROGram(s) Oral daily  NIFEdipine XL 30 milliGRAM(s) Oral daily  simvastatin 20 milliGRAM(s) Oral at bedtime

## 2020-08-21 NOTE — PROGRESS NOTE ADULT - PROBLEM SELECTOR PLAN 2
Pt has hx of B12 deficiency with macrocytic anemia  -Start B12 1000 IM daily, and folate  -Pt likely has pernicious anemia with positive parietal cell antibodies and an indeterminate intrinsic factor on Aug 5th  -B12 level upon discharge from last admission on 8/11 was high Pt has hx of B12 deficiency with macrocytic anemia  -B12 >2000 per AM lab on 8/21  -As above, now transitioned to B12 weekly  -Pt likely has pernicious anemia with positive parietal cell antibodies and an indeterminate intrinsic factor on Aug 5th  -B12 level upon discharge from last admission on 8/11 was high

## 2020-08-21 NOTE — PROGRESS NOTE ADULT - ASSESSMENT
75F PMH of MI 25 years ago, DM, HTN, recent admission for B12 deficiency with LE neurological symptoms, and found to also have bilateral DVTs prior to d/c, started on Eliquis (30 day supply provided by the hospital), presented with diffuse neuropathy concerning for vitamin B12 deficiency, as well as thrombocytosis of 723. 75F PMH of MI 25 years ago, DM, HTN, recent admission for B12 deficiency with LE neurological symptoms, and found to also have bilateral DVTs prior to d/c, started on Eliquis (30 day supply provided by the hospital), presented with diffuse neuropathy concerning for vitamin B12 deficiency, as well as thrombocytosis of 723, down to 590 on AM labs today and with B12 >2000 also on AM labs. Based on the patient's description of events (feeling like everything was closing in and the classic description of "impending doom"), it is likely that she had a panic attack on awakening the AM after initiating gabapentin as rx by her PCP.

## 2020-08-21 NOTE — DIETITIAN INITIAL EVALUATION ADULT. - PERTINENT LABORATORY DATA
08-21 Na 143 mmol/L Glu 82 mg/dL K+ 4.6 mmol/L Cr 1.12 mg/dL BUN 18 mg/dL Phos 3.2 mg/dL  08-21 @ 12:14 POCT 78 mg/dL  08-21 @ 08:45 POCT 85 mg/dL  08-20 @ 22:16 POCT 87 mg/dL  08-20 @ 17:44 POCT 97 mg/dL

## 2020-08-21 NOTE — PROGRESS NOTE ADULT - ATTENDING COMMENTS
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 As per patient, her gait has significantly improved since last hospitalization. She does  continue to report numbness/tingling and that appeared to be the reason for her presentation to the hospital. On arrival, labs were notable for mild leukocytosis w/ significant bump of platelet to 723. Note that from prev hospitalization: Intrinsic Ab was indeterminant. ANti-parietal cell Ab were positive. Gastrin was normal. Pt was admitted for further eval.     No significant issues overnight. Still reporting numbness/tingling and "tightness" most prominent at the feet. Otherwise, strength remains stable. Case d/w PA @ PCP office. Pt was recently seen on 8/19 with the same complaint. She was given gabapentin at the time.     #B12 neuropathy  #Thrombocytosis  #Macrocytic anemia  #Atrophic gastritis  #BLE DVT    -Neuropathy sx otherwise stable. Strength has drastically improved compared to previous hospitalization. Her sx are most likely related to B12 deficiency which may take weeks to months for improvement. PT input for dispo recommendations.   -Platelets improved. Per PCP -finding is not new. This will be followed up by PCP. No urgent need to workup at this time. Thrombocytosis could very well be related to anemia.  -Folate low. Ferritin almost down to 30. Anemia could be multifactorial - B12, iron, folate - in the setting of atrophic gastritis. Will start supplementing.  -Can change IM B12 to qweekly. Further titration as per PCP.   -Would not continue gabapentin due to side effects reported by pt.    Rest of plan as discussed.

## 2020-08-21 NOTE — DIETITIAN INITIAL EVALUATION ADULT. - OTHER INFO
76 y/o female known to our service from recent [facility] admission, now admitted with thrombocytosis. Nutrition consult generated for pressure injuries Stage II or >. Visited with pt to obtain nutrition hx. Previous RD assessment on 8/9/20 from recent hospitalization reviewed. Pt said that her appetite and oral intake have been poor since her last admission due to ongoing medical issues. Patient reports she typically has 3 small meals a day but this has changed as she has felt weak and tired much of the time this month. Food allergies noted and is confirmed with patient. Patient denies chewing/swallowing difficulties at meals with no GI distress (nausea/vomiting/diarrhea/constipation) at this time. Her weight recorded last admission was 73.5 kg with wt this admission 15% below that. Pt did have more extensive edema during her previous admission, however weight loss likely also due to poor oral intake as well. Given that pt also has two Stage II pressure injuries, recommend Glucerna Therapeutic Nutrition Shake 240mls 2x daily (440kcals, 20g protein) with Prosource x1/day (15 gms protein/pkg) to optimize nutrition, assist with weight stabilization and enhance skin integrity. Pt additionally exhibits physical evidence of moderate/severe subcutaneous fat store depletion with moderate/severe muscle mass wasting placing pt at severe risk for malnutrition.

## 2020-08-21 NOTE — PROGRESS NOTE ADULT - PROBLEM SELECTOR PLAN 8
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 Marion Wallace Pt has bilateral LE DVTs on Eliquis  -Diet: Regular, low sodium, carb consistent  -Dispo: PCP is Dr. Marion Wallace, pending PT eval in order to set up home services as needed

## 2020-08-21 NOTE — PROGRESS NOTE ADULT - PROBLEM SELECTOR PLAN 1
Pt has new onset thrombocytosis to 723, not seen before previously discharged on 8/11  -May be due to bilateral DVTs  -anemia/blood loss, obtain iron studies tomorrow  -infectious causes could be possible given slightly elevated WBC. Negative Covid, UA, CXR.  -perform peripheral blood smear  -recheck CBC tomorrow, if not normalized, consider heme/onc consult with testing for JAK2, CALR, MPR, BCR-ABL  -Suspect autonomous thrombocytosis: causing clotting: essential thrombocythemia polycythemia vera, primary myelofibrosis, CML, myelodysplastic syndromes, AML  -malignancy/rheumatologic causes less likely Pt has new onset thrombocytosis to 723, not seen before previously discharged on 8/11--> downtrending to 590 on AM lab as of 8/21  -May be due to bilateral DVTs  -Based on iron studies, pt may be chronically anemic w/ reactive thrombocytosis       -C/w B12 repletion--> transitioning now to once weekly        -Repleting iron and folate   -infectious causes could be possible given slightly elevated WBC. Negative Covid, UA, CXR.  -perform peripheral blood smear  -recheck CBC tomorrow, if not normalized, consider heme/onc consult with testing for JAK2, CALR, MPR, BCR-ABL  -Suspect autonomous thrombocytosis: causing clotting: essential thrombocythemia polycythemia vera, primary myelofibrosis, CML, myelodysplastic syndromes, AML  -malignancy/rheumatologic causes less likely

## 2020-08-21 NOTE — DIETITIAN INITIAL EVALUATION ADULT. - ADD RECOMMEND
1). Suggest Glucerna Therapeutic Nutrition Shake 240mls 2x daily (440kcals, 20g protein) with Prosource x 1/day. 2). Encourage and monitor oral intake, especially of nutrition supplement. 3). Monitor weights, labs, BM's, skin integrity, p.o. intake and edema. 4). Follow pt as per protocol.

## 2020-08-21 NOTE — PROGRESS NOTE ADULT - PROBLEM SELECTOR PLAN 7
Pt feels lightheaded when getting up from seated position  -Obtain orthostatic vitals  -Pt s/p 1 L NS, regular diet, encourage PO fluids

## 2020-08-21 NOTE — CHART NOTE - NSCHARTNOTEFT_GEN_A_CORE
NUTRITION SERVICES     Upon Nutritional Assessment by the Registered Dietitian your patient was determined to meet criteria/ has evidence of the following diagnosis/diagnoses:  [ ] Mild Protein Calorie Malnutrition   [ ] Moderate Protein Calorie Malnutrition   [x] Severe Protein Calorie Malnutrition     Findings as based on:  •  Comprehensive nutritional assessment and consultation    Etiology : in the context of acute illness.     Signs/Symptoms : 15% wt loss over 2 wks PTA with moderate/severe subcutaneous fat store loss & muscle mass wasting.       Please refer to Initial Dietitian Evaluation via documents section of [employer] for further recommendations.

## 2020-08-21 NOTE — PROGRESS NOTE ADULT - SUBJECTIVE AND OBJECTIVE BOX
******NOTE INCOMPLETE/ NEEDS TO BE UPDATED***** PROGRESS NOTE:   Authored by Dr. Aileen Grajeda MD  Pager [facility]: [phone]; [facility]: [phone]     Patient is a 75y old  Female who presents with a chief complaint of Numbness/tingling (21 Aug 2020 07:12)    SUBJECTIVE / OVERNIGHT EVENTS:  Pt states that overall, she feels better. We discussed a new medication that had been prescribed to her by her PCP to help with the numbness/ tingling of her feet that she took only once, the night before admission. After she took this medication, she went to sleep, and on the morning of presentation/ admission, she awoke feeling like "everything was closing in" and she felt like she "was going to die." After this conversation, the medical team touched base w/ her PCP, her stated that the medication was gabapentin.     ADDITIONAL REVIEW OF SYSTEMS:    MEDICATIONS  (STANDING):  apixaban 5 milliGRAM(s) Oral every 12 hours  cyanocobalamin Injectable 1000 MICROGram(s) IntraMuscular daily  dextrose 5%. 1000 milliLiter(s) (50 mL/Hr) IV Continuous <Continuous>  dextrose 50% Injectable 12.5 Gram(s) IV Push once  dextrose 50% Injectable 25 Gram(s) IV Push once  dextrose 50% Injectable 25 Gram(s) IV Push once  ferrous    sulfate 325 milliGRAM(s) Oral daily  folic acid 1 milliGRAM(s) Oral daily  gabapentin 300 milliGRAM(s) Oral two times a day  insulin lispro (HumaLOG) corrective regimen sliding scale   SubCutaneous three times a day before meals  insulin lispro (HumaLOG) corrective regimen sliding scale   SubCutaneous at bedtime  levothyroxine 150 MICROGram(s) Oral daily  NIFEdipine XL 30 milliGRAM(s) Oral daily  simvastatin 20 milliGRAM(s) Oral at bedtime    MEDICATIONS  (PRN):  dextrose 40% Gel 15 Gram(s) Oral once PRN Blood Glucose LESS THAN 70 milliGRAM(s)/deciliter  glucagon  Injectable 1 milliGRAM(s) IntraMuscular once PRN Glucose LESS THAN 70 milligrams/deciliter      CAPILLARY BLOOD GLUCOSE      POCT Blood Glucose.: 78 mg/dL (21 Aug 2020 12:14)  POCT Blood Glucose.: 85 mg/dL (21 Aug 2020 08:45)  POCT Blood Glucose.: 87 mg/dL (20 Aug 2020 22:16)  POCT Blood Glucose.: 97 mg/dL (20 Aug 2020 17:44)    I&O's Summary      PHYSICAL EXAM:  Vital Signs Last 24 Hrs  T(C): 36.7 (21 Aug 2020 06:54), Max: 37.1 (20 Aug 2020 18:16)  T(F): 98 (21 Aug 2020 06:54), Max: 98.7 (20 Aug 2020 18:16)  HR: 55 (21 Aug 2020 06:54) (55 - 68)  BP: 138/69 (21 Aug 2020 06:54) (132/61 - 138/86)  BP(mean): --  RR: 18 (21 Aug 2020 06:54) (18 - 19)  SpO2: 100% (21 Aug 2020 06:54) (100% - 100%)    GENERAL: No acute distress, well-developed  HEAD:  Atraumatic, Normocephalic  EYES: EOMI, PERRLA, conjunctiva and sclera clear  NECK: Supple, no lymphadenopathy, no JVD  CHEST/LUNG: CTAB; No wheezes, rales, or rhonchi  HEART: Regular rate and rhythm; No murmurs, rubs, or gallops  ABDOMEN: Soft, non-tender, non-distended; normal bowel sounds, no organomegaly  EXTREMITIES:  2+ peripheral pulses b/l, No clubbing, cyanosis, or edema  NEUROLOGY: A&O x 3, no focal deficits  SKIN: No rashes or lesions    LABS:                        8.4    9.70  )-----------( 590      ( 21 Aug 2020 05:24 )             26.3     08-21    143  |  106  |  18  ----------------------------<  82  4.6   |  26  |  1.12    Ca    10.5      21 Aug 2020 05:24  Phos  3.2     08-21  Mg     2.0     08-21    TPro  6.6  /  Alb  3.3  /  TBili  0.4  /  DBili  x   /  AST  15  /  ALT  10  /  AlkPhos  57  08-21    PT/INR - ( 20 Aug 2020 10:53 )   PT: 14.4 SEC;   INR: 1.27          PTT - ( 20 Aug 2020 10:53 )  PTT:36.4 SEC  CARDIAC MARKERS ( 20 Aug 2020 10:53 )  x     / x     / 35 u/L / x     / x          Urinalysis Basic - ( 20 Aug 2020 11:37 )    Color: LT. YELLOW / Appearance: CLEAR / S.011 / pH: 6.5  Gluc: NEGATIVE / Ketone: NEGATIVE  / Bili: NEGATIVE / Urobili: NORMAL   Blood: NEGATIVE / Protein: TRACE / Nitrite: NEGATIVE   Leuk Esterase: TRACE / RBC: x / WBC 0-2   Sq Epi: OCC / Non Sq Epi: x / Bacteria: FEW          RADIOLOGY & ADDITIONAL TESTS:  Results Reviewed:   Imaging Personally Reviewed:  Electrocardiogram Personally Reviewed:    COORDINATION OF CARE:  Care Discussed with Consultants/Other Providers [Y/N]:  Prior or Outpatient Records Reviewed [Y/N]: PROGRESS NOTE:   Authored by Dr. Aileen Grajeda MD  Pager [facility]: [phone]; [facility]: [phone]     Patient is a 75y old  Female who presents with a chief complaint of Numbness/tingling (21 Aug 2020 07:12)    SUBJECTIVE / OVERNIGHT EVENTS:  Pt states that overall, she feels better. No neuro complaints aside from those she has at baseline. We discussed a new medication that had been prescribed to her by her PCP to help with the numbness/ tingling of her feet that she took only once, the night before admission. After she took this medication, she went to sleep, and on the morning of presentation/ admission, she awoke feeling intense numbness/ tingling of the arms and like "everything was closing in" and that she also felt like she "was going to die." After this conversation, the medical team touched base w/ her PCP, who stated that the medication was gabapentin.       MEDICATIONS  (STANDING):  apixaban 5 milliGRAM(s) Oral every 12 hours  cyanocobalamin Injectable 1000 MICROGram(s) IntraMuscular daily  dextrose 5%. 1000 milliLiter(s) (50 mL/Hr) IV Continuous <Continuous>  dextrose 50% Injectable 12.5 Gram(s) IV Push once  dextrose 50% Injectable 25 Gram(s) IV Push once  dextrose 50% Injectable 25 Gram(s) IV Push once  ferrous    sulfate 325 milliGRAM(s) Oral daily  folic acid 1 milliGRAM(s) Oral daily  gabapentin 300 milliGRAM(s) Oral two times a day  insulin lispro (HumaLOG) corrective regimen sliding scale   SubCutaneous three times a day before meals  insulin lispro (HumaLOG) corrective regimen sliding scale   SubCutaneous at bedtime  levothyroxine 150 MICROGram(s) Oral daily  NIFEdipine XL 30 milliGRAM(s) Oral daily  simvastatin 20 milliGRAM(s) Oral at bedtime    MEDICATIONS  (PRN):  dextrose 40% Gel 15 Gram(s) Oral once PRN Blood Glucose LESS THAN 70 milliGRAM(s)/deciliter  glucagon  Injectable 1 milliGRAM(s) IntraMuscular once PRN Glucose LESS THAN 70 milligrams/deciliter      CAPILLARY BLOOD GLUCOSE  POCT Blood Glucose.: 78 mg/dL (21 Aug 2020 12:14)  POCT Blood Glucose.: 85 mg/dL (21 Aug 2020 08:45)  POCT Blood Glucose.: 87 mg/dL (20 Aug 2020 22:16)  POCT Blood Glucose.: 97 mg/dL (20 Aug 2020 17:44)      PHYSICAL EXAM:  Vital Signs Last 24 Hrs  T(C): 36.7 (21 Aug 2020 06:54), Max: 37.1 (20 Aug 2020 18:16)  T(F): 98 (21 Aug 2020 06:54), Max: 98.7 (20 Aug 2020 18:16)  HR: 55 (21 Aug 2020 06:54) (55 - 68)  BP: 138/69 (21 Aug 2020 06:54) (132/61 - 138/86)  BP(mean): --  RR: 18 (21 Aug 2020 06:54) (18 - 19)  SpO2: 100% (21 Aug 2020 06:54) (100% - 100%)    GENERAL: NAD  HEAD:  Atraumatic, Normocephalic  EYES: EOMI, conjunctiva and sclera clear  ENT: Moist mucous membranes  NECK: Supple, No JVD  CHEST/LUNG: Clear to auscultation bilaterally; No rales, rhonchi, wheezing, or rubs. Unlabored respirations  HEART: Regular rate and rhythm; No murmurs, rubs, or gallops  ABDOMEN: Bowel sounds present; Soft, Nontender, Nondistended.  EXTREMITIES:  2+ Peripheral Pulses, brisk capillary refill. No clubbing, cyanosis, or edema  NERVOUS SYSTEM: Alert & Oriented X3, speech clear. Full ROM, 5/5 strength equal bilateral ext. Significantly reduced sensation of the plantar surfaces of the feet b/l  MSK: FROM all 4 extremities, full and equal strength. No calf or thigh tenderness.  SKIN: +++Vitiligo seen on bilateral hands        LABS:                        8.4    9.70  )-----------( 590      ( 21 Aug 2020 05:24 )             26.3     08-21    143  |  106  |  18  ----------------------------<  82  4.6   |  26  |  1.12    Ca    10.5      21 Aug 2020 05:24  Phos  3.2     08-21  Mg     2.0     08-21    TPro  6.6  /  Alb  3.3  /  TBili  0.4  /  DBili  x   /  AST  15  /  ALT  10  /  AlkPhos  57  08-21    PT/INR - ( 20 Aug 2020 10:53 )   PT: 14.4 SEC;   INR: 1.27          PTT - ( 20 Aug 2020 10:53 )  PTT:36.4 SEC  CARDIAC MARKERS ( 20 Aug 2020 10:53 )  x     / x     / 35 u/L / x     / x          Urinalysis Basic - ( 20 Aug 2020 11:37 )    Color: LT. YELLOW / Appearance: CLEAR / S.011 / pH: 6.5  Gluc: NEGATIVE / Ketone: NEGATIVE  / Bili: NEGATIVE / Urobili: NORMAL   Blood: NEGATIVE / Protein: TRACE / Nitrite: NEGATIVE   Leuk Esterase: TRACE / RBC: x / WBC 0-2   Sq Epi: OCC / Non Sq Epi: x / Bacteria: FEW          RADIOLOGY & ADDITIONAL TESTS:  Results Reviewed:   Imaging Personally Reviewed:  Electrocardiogram Personally Reviewed:    COORDINATION OF CARE:  Care Discussed with Consultants/Other Providers [Y/N]:  Prior or Outpatient Records Reviewed [Y/N]: PROGRESS NOTE:   Authored by Dr. Aileen Grajeda MD  Pager [facility]: [phone]; [facility]: [phone]     Patient is a 75y old  Female who presents with a chief complaint of Numbness/tingling (21 Aug 2020 07:12)    SUBJECTIVE / OVERNIGHT EVENTS:  Pt states that overall, she feels better. No neuro complaints aside from those she has at baseline. Since her last d/c, she states that she has been getting her B12 injections daily (daughter is a nurse and injects her before she goes to work) and she has been taking her eliquis as prescribed. She also reported a new medication that had been prescribed to her by her PCP to help with the numbness/ tingling of her feet that she took only once, the night before admission. After she took this medication, she went to sleep, and on the morning of presentation/ admission, she awoke feeling intense numbness/ tingling of the arms and like "everything was closing in" and that she also felt like she "was going to die." After this conversation, the medical team touched base w/ her PCP, who stated that the medication was gabapentin.       MEDICATIONS  (STANDING):  apixaban 5 milliGRAM(s) Oral every 12 hours  cyanocobalamin Injectable 1000 MICROGram(s) IntraMuscular daily  dextrose 5%. 1000 milliLiter(s) (50 mL/Hr) IV Continuous <Continuous>  dextrose 50% Injectable 12.5 Gram(s) IV Push once  dextrose 50% Injectable 25 Gram(s) IV Push once  dextrose 50% Injectable 25 Gram(s) IV Push once  ferrous    sulfate 325 milliGRAM(s) Oral daily  folic acid 1 milliGRAM(s) Oral daily  gabapentin 300 milliGRAM(s) Oral two times a day  insulin lispro (HumaLOG) corrective regimen sliding scale   SubCutaneous three times a day before meals  insulin lispro (HumaLOG) corrective regimen sliding scale   SubCutaneous at bedtime  levothyroxine 150 MICROGram(s) Oral daily  NIFEdipine XL 30 milliGRAM(s) Oral daily  simvastatin 20 milliGRAM(s) Oral at bedtime    MEDICATIONS  (PRN):  dextrose 40% Gel 15 Gram(s) Oral once PRN Blood Glucose LESS THAN 70 milliGRAM(s)/deciliter  glucagon  Injectable 1 milliGRAM(s) IntraMuscular once PRN Glucose LESS THAN 70 milligrams/deciliter      CAPILLARY BLOOD GLUCOSE  POCT Blood Glucose.: 78 mg/dL (21 Aug 2020 12:14)  POCT Blood Glucose.: 85 mg/dL (21 Aug 2020 08:45)  POCT Blood Glucose.: 87 mg/dL (20 Aug 2020 22:16)  POCT Blood Glucose.: 97 mg/dL (20 Aug 2020 17:44)      PHYSICAL EXAM:  Vital Signs Last 24 Hrs  T(C): 36.7 (21 Aug 2020 06:54), Max: 37.1 (20 Aug 2020 18:16)  T(F): 98 (21 Aug 2020 06:54), Max: 98.7 (20 Aug 2020 18:16)  HR: 55 (21 Aug 2020 06:54) (55 - 68)  BP: 138/69 (21 Aug 2020 06:54) (132/61 - 138/86)  BP(mean): --  RR: 18 (21 Aug 2020 06:54) (18 - 19)  SpO2: 100% (21 Aug 2020 06:54) (100% - 100%)    GENERAL: NAD  HEAD:  Atraumatic, Normocephalic  EYES: EOMI, conjunctiva and sclera clear  ENT: Moist mucous membranes  NECK: Supple, No JVD  CHEST/LUNG: Clear to auscultation bilaterally; No rales, rhonchi, wheezing, or rubs. Unlabored respirations  HEART: Regular rate and rhythm; No murmurs, rubs, or gallops  ABDOMEN: Bowel sounds present; Soft, Nontender, Nondistended.  EXTREMITIES:  2+ Peripheral Pulses, brisk capillary refill. No clubbing, cyanosis, or edema  NERVOUS SYSTEM: Alert & Oriented X3, speech clear. Full ROM, 5/5 strength equal bilateral ext. Significantly reduced sensation of the plantar surfaces of the feet b/l  MSK: FROM all 4 extremities, full and equal strength. No calf or thigh tenderness.  SKIN: +++Vitiligo seen on bilateral hands        LABS:                        8.4    9.70  )-----------( 590      ( 21 Aug 2020 05:24 )             26.3     08-    143  |  106  |  18  ----------------------------<  82  4.6   |  26  |  1.12    Ca    10.5      21 Aug 2020 05:24  Phos  3.2     08-  Mg     2.0     -    TPro  6.6  /  Alb  3.3  /  TBili  0.4  /  DBili  x   /  AST  15  /  ALT  10  /  AlkPhos  57  -    PT/INR - ( 20 Aug 2020 10:53 )   PT: 14.4 SEC;   INR: 1.27          PTT - ( 20 Aug 2020 10:53 )  PTT:36.4 SEC  CARDIAC MARKERS ( 20 Aug 2020 10:53 )  x     / x     / 35 u/L / x     / x          Urinalysis Basic - ( 20 Aug 2020 11:37 )    Color: LT. YELLOW / Appearance: CLEAR / S.011 / pH: 6.5  Gluc: NEGATIVE / Ketone: NEGATIVE  / Bili: NEGATIVE / Urobili: NORMAL   Blood: NEGATIVE / Protein: TRACE / Nitrite: NEGATIVE   Leuk Esterase: TRACE / RBC: x / WBC 0-2   Sq Epi: OCC / Non Sq Epi: x / Bacteria: FEW

## 2020-08-22 ENCOUNTER — TRANSCRIPTION ENCOUNTER (OUTPATIENT)
Age: 75
End: 2020-08-22

## 2020-08-22 VITALS
TEMPERATURE: 98 F | SYSTOLIC BLOOD PRESSURE: 110 MMHG | RESPIRATION RATE: 17 BRPM | DIASTOLIC BLOOD PRESSURE: 63 MMHG | HEART RATE: 74 BPM | OXYGEN SATURATION: 97 %

## 2020-08-22 DIAGNOSIS — J45.909 UNSPECIFIED ASTHMA, UNCOMPLICATED: ICD-10-CM

## 2020-08-22 LAB
ALBUMIN SERPL ELPH-MCNC: 3.5 G/DL — SIGNIFICANT CHANGE UP (ref 3.3–5)
ALP SERPL-CCNC: 59 U/L — SIGNIFICANT CHANGE UP (ref 40–120)
ALT FLD-CCNC: 9 U/L — SIGNIFICANT CHANGE UP (ref 4–33)
ANION GAP SERPL CALC-SCNC: 14 MMO/L — SIGNIFICANT CHANGE UP (ref 7–14)
AST SERPL-CCNC: 17 U/L — SIGNIFICANT CHANGE UP (ref 4–32)
BILIRUB SERPL-MCNC: 0.4 MG/DL — SIGNIFICANT CHANGE UP (ref 0.2–1.2)
BUN SERPL-MCNC: 21 MG/DL — SIGNIFICANT CHANGE UP (ref 7–23)
CALCIUM SERPL-MCNC: 10.4 MG/DL — SIGNIFICANT CHANGE UP (ref 8.4–10.5)
CHLORIDE SERPL-SCNC: 104 MMOL/L — SIGNIFICANT CHANGE UP (ref 98–107)
CO2 SERPL-SCNC: 20 MMOL/L — LOW (ref 22–31)
CREAT SERPL-MCNC: 0.93 MG/DL — SIGNIFICANT CHANGE UP (ref 0.5–1.3)
GLUCOSE BLDC GLUCOMTR-MCNC: 101 MG/DL — HIGH (ref 70–99)
GLUCOSE BLDC GLUCOMTR-MCNC: 98 MG/DL — SIGNIFICANT CHANGE UP (ref 70–99)
GLUCOSE SERPL-MCNC: 79 MG/DL — SIGNIFICANT CHANGE UP (ref 70–99)
HCT VFR BLD CALC: 30.4 % — LOW (ref 34.5–45)
HGB BLD-MCNC: 9.2 G/DL — LOW (ref 11.5–15.5)
MAGNESIUM SERPL-MCNC: 2.1 MG/DL — SIGNIFICANT CHANGE UP (ref 1.6–2.6)
MCHC RBC-ENTMCNC: 30.3 % — LOW (ref 32–36)
MCHC RBC-ENTMCNC: 36.8 PG — HIGH (ref 27–34)
MCV RBC AUTO: 121.6 FL — HIGH (ref 80–100)
NRBC # FLD: 0 K/UL — SIGNIFICANT CHANGE UP (ref 0–0)
PHOSPHATE SERPL-MCNC: 3.3 MG/DL — SIGNIFICANT CHANGE UP (ref 2.5–4.5)
PLATELET # BLD AUTO: 527 K/UL — HIGH (ref 150–400)
PMV BLD: 11.7 FL — SIGNIFICANT CHANGE UP (ref 7–13)
POTASSIUM SERPL-MCNC: 4.6 MMOL/L — SIGNIFICANT CHANGE UP (ref 3.5–5.3)
POTASSIUM SERPL-SCNC: 4.6 MMOL/L — SIGNIFICANT CHANGE UP (ref 3.5–5.3)
PROT SERPL-MCNC: 6.8 G/DL — SIGNIFICANT CHANGE UP (ref 6–8.3)
RBC # BLD: 2.5 M/UL — LOW (ref 3.8–5.2)
RBC # FLD: 18.2 % — HIGH (ref 10.3–14.5)
SODIUM SERPL-SCNC: 138 MMOL/L — SIGNIFICANT CHANGE UP (ref 135–145)
WBC # BLD: 9.91 K/UL — SIGNIFICANT CHANGE UP (ref 3.8–10.5)
WBC # FLD AUTO: 9.91 K/UL — SIGNIFICANT CHANGE UP (ref 3.8–10.5)

## 2020-08-22 PROCEDURE — 99239 HOSP IP/OBS DSCHRG MGMT >30: CPT | Mod: GC

## 2020-08-22 RX ORDER — LEVOTHYROXINE SODIUM 125 MCG
1 TABLET ORAL
Qty: 0 | Refills: 0 | DISCHARGE
Start: 2020-08-22

## 2020-08-22 RX ORDER — ALBUTEROL 90 UG/1
2 AEROSOL, METERED ORAL EVERY 6 HOURS
Refills: 0 | Status: DISCONTINUED | OUTPATIENT
Start: 2020-08-22 | End: 2020-08-22

## 2020-08-22 RX ORDER — GABAPENTIN 400 MG/1
1 CAPSULE ORAL
Qty: 0 | Refills: 0 | DISCHARGE

## 2020-08-22 RX ORDER — FOLIC ACID 0.8 MG
1 TABLET ORAL
Qty: 30 | Refills: 0
Start: 2020-08-22 | End: 2020-09-20

## 2020-08-22 RX ORDER — FERROUS SULFATE 325(65) MG
1 TABLET ORAL
Qty: 30 | Refills: 0
Start: 2020-08-22 | End: 2020-09-20

## 2020-08-22 RX ORDER — PREGABALIN 225 MG/1
1 CAPSULE ORAL
Qty: 4 | Refills: 0
Start: 2020-08-22 | End: 2020-09-20

## 2020-08-22 RX ORDER — FLUTICASONE PROPIONATE 50 MCG
2 SPRAY, SUSPENSION NASAL
Qty: 0 | Refills: 0 | DISCHARGE

## 2020-08-22 RX ORDER — BUDESONIDE AND FORMOTEROL FUMARATE DIHYDRATE 160; 4.5 UG/1; UG/1
2 AEROSOL RESPIRATORY (INHALATION)
Refills: 0 | Status: DISCONTINUED | OUTPATIENT
Start: 2020-08-22 | End: 2020-08-22

## 2020-08-22 RX ORDER — ALBUTEROL 90 UG/1
2 AEROSOL, METERED ORAL
Qty: 0 | Refills: 0 | DISCHARGE
Start: 2020-08-22

## 2020-08-22 RX ADMIN — APIXABAN 5 MILLIGRAM(S): 2.5 TABLET, FILM COATED ORAL at 06:29

## 2020-08-22 RX ADMIN — Medication 325 MILLIGRAM(S): at 12:03

## 2020-08-22 RX ADMIN — Medication 1 MILLIGRAM(S): at 12:03

## 2020-08-22 RX ADMIN — PREGABALIN 1000 MICROGRAM(S): 225 CAPSULE ORAL at 12:04

## 2020-08-22 RX ADMIN — BUDESONIDE AND FORMOTEROL FUMARATE DIHYDRATE 2 PUFF(S): 160; 4.5 AEROSOL RESPIRATORY (INHALATION) at 11:30

## 2020-08-22 RX ADMIN — Medication 150 MICROGRAM(S): at 06:29

## 2020-08-22 RX ADMIN — Medication 30 MILLIGRAM(S): at 06:29

## 2020-08-22 NOTE — PROGRESS NOTE ADULT - PROBLEM SELECTOR PLAN 8
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 Marion Wallace, pending PT eval in order to set up home services as needed Pt feels lightheaded when getting up from seated position  -Obtain orthostatic vitals  -Pt s/p 1 L NS, regular diet, encourage PO fluids

## 2020-08-22 NOTE — PROGRESS NOTE ADULT - PROBLEM SELECTOR PLAN 5
Pt has long standing diabetes, not on insulin at home  -ISS Pt has longstanding hypothyroidism on Levothyroxine  -TSH elevated at 13.88  -Home dose levothyroxine is 125mcg, started on 150mcg in the hospital

## 2020-08-22 NOTE — PROGRESS NOTE ADULT - PROBLEM SELECTOR PLAN 7
Pt feels lightheaded when getting up from seated position  -Obtain orthostatic vitals  -Pt s/p 1 L NS, regular diet, encourage PO fluids Pt has numbness and tingling of bilateral upper and lower ext as well as chest  -See management for B12 deficiency above  -Fall risk precautions

## 2020-08-22 NOTE — PHYSICAL THERAPY INITIAL EVALUATION ADULT - RISK REDUCTION/PREVENTION, PT EVAL
Despite some risk factors, the patient does not demonstrate definitive evidence of glaucoma at this time. risk factors

## 2020-08-22 NOTE — PROGRESS NOTE ADULT - PROBLEM SELECTOR PLAN 4
Pt has longstanding hypothyroidism on Levothyroxine  -TSH elevated at 13.88  -Home dose levothyroxine is 125mcg, started on 150mcg in the hospital Saturating well on room air  -Albuterol q6h PRN  -C/w advair therapeutic interchange  -Instructed patient that advair must be taken every day

## 2020-08-22 NOTE — PROGRESS NOTE ADULT - PROBLEM SELECTOR PROBLEM 5
Type 2 diabetes mellitus with diabetic neuropathy, without long-term current use of insulin Hypothyroidism, unspecified type

## 2020-08-22 NOTE — DISCHARGE NOTE PROVIDER - NSDCCPCAREPLAN_GEN_ALL_CORE_FT
PRINCIPAL DISCHARGE DIAGNOSIS  Diagnosis: Numbness and tingling  Assessment and Plan of Treatment: Your numbness and tingling was likely due to a panic attack. We found nothing dangerously wrong with your heart or lungs that could cause this pain. Please continue to follow up with your primary care doctor. Please return or call your PCP if you have concerning symptoms like chest pain or shortness of breath.      SECONDARY DISCHARGE DIAGNOSES  Diagnosis: Discharge planning issues  Assessment and Plan of Treatment: Show this to your doctor:  Ms. Lui is a 76 yo F with a PMH significant for T2DM, HTN, and recent admission for B12 deficiency 2/2 atrophic gastritis with lower extremity neuropathy c/b bilateral DVTs presenting with known B12 deficiency neuropathy, paresthesias with anxiety. Troponins, cxr, ekg were WNL. In the ED, she complained of acute onset of numbness and anxiety across her chest. She denies chest pain. Her  brought her to the emergency room where her symptoms self-resolved. She associates her symptoms with gabapentin and so it was stopped. On routine lab work, she was found to have a new thrombocytosis to 723 that self resolved to 527 over two days. She was also noted to have a high RDW, low folate to 3.8 and borderline ferritin at 36. She was begun on folate and ferritin supplementation. She was found to have some rhonchi on exam and was started on home advair and albuterol PRN. She was diagnosed with a panic attack for the symptoms brought her to the ED. 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 As it is spontaneously resolving, ready for discharge and outpatient followup. Your levothyroxine was also increased because your TSH was high  To consider follow up with PCP:  -Repeat CBC  -C/w folate  -C/w iron  -Transition to B12 injections weekly  -Levothyroxine increased from 125 to 150. Please f/u TSH  -Restart gabapentin if tolerated      Diagnosis: DVT (deep venous thrombosis)  Assessment and Plan of Treatment: You still have blood clots in your leg. Please continue to take your blood thinning medicine as perscribed.    Diagnosis: Thrombocytosis  Assessment and Plan of Treatment: You had a high platelet count. It started to get bettre on its own. Please follow up with your primary care doctor to make sure that it resolves. High platelet counts with a recent history of blood clots may be dangerous and could require further work up with your doctor.    Diagnosis: Hypothyroidism, unspecified type  Assessment and Plan of Treatment: Your thyroid levels were a little low. This suggests that you need more thryoid medicine. Please start taking 150 mcg of levothyroxine instead of 125 mcg. Follow up with your primary care doctor to repeat your thyroid tests.

## 2020-08-22 NOTE — PROGRESS NOTE ADULT - PROBLEM SELECTOR PLAN 6
Pt has numbness and tingling of bilateral upper and lower ext as well as chest  -See management for B12 deficiency above  -Pt is on bedrest and may ambulate with assistance  -Fall risk precautions Pt has long standing diabetes, not on insulin at home  -ISS

## 2020-08-22 NOTE — DISCHARGE NOTE PROVIDER - CARE PROVIDER_API CALL
Marion Matthews  INTERNAL MEDICINE  [address] [place] [address]  Phone: [phone]  Fax: [phone]  Follow Up Time:

## 2020-08-22 NOTE — PROGRESS NOTE ADULT - PROBLEM SELECTOR PLAN 1
Pt has new onset thrombocytosis to 723, not seen before previously discharged on 8/11--> downtrending to 590 on AM lab as of 8/21  -May be due to bilateral DVTs  -Based on iron studies, pt may be chronically anemic w/ reactive thrombocytosis       -C/w B12 repletion--> transitioning now to once weekly        -Repleting iron and folate   -infectious causes could be possible given slightly elevated WBC. Negative Covid, UA, CXR.  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 Negative Covid, UA, CXR.  -Initially had suspicion for autonomous thrombocytosis: causing clotting: essential thrombocythemia polycythemia vera, primary myelofibrosis, CML, myelodysplastic syndromes, AML. However, safe for outpatient follow up as is now self resolving

## 2020-08-22 NOTE — PHYSICAL THERAPY INITIAL EVALUATION ADULT - DISCHARGE DISPOSITION, PT EVAL
to improve strength and balance for safe ambulation and transfers to prevent future falls/home w/ home PT

## 2020-08-22 NOTE — PHYSICAL THERAPY INITIAL EVALUATION ADULT - ADDITIONAL COMMENTS
Pt lives with her , daughter and grandchildren in private home. There are steps inside but pt reports she doesn't need to negotiate them. Pt was ambulating with rolling walker prior to admission. Pt states she needs assistance with ADL that her  and daughter help with.

## 2020-08-22 NOTE — DISCHARGE NOTE PROVIDER - HOSPITAL COURSE
Ms. Lui is a 74 yo F with a PMH significant for T2DM, HTN, and recent admission for B12 deficiency 2/2 atrophic gastritis with lower extremity neuropathy c/b bilateral DVTs presenting with known B12 deficiency neuropathy, paresthesias with anxiety. Troponins, cxr, ekg were WNL. In the ED, she complained of acute onset of numbness and anxiety across her chest. She denies chest pain. Her  brought her to the emergency room where her symptoms self-resolved. She associates her symptoms with gabapentin and so it was stopped. On routine lab work, she was found to have a new thrombocytosis to 723 that self resolved to 527 over two days. She was also noted to have a high RDW, low folate to 3.8 and borderline ferritin at 36. She was begun on folate and ferritin supplementation. She was found to have some rhonchi on exam and was started on home advair and albuterol PRN. She was diagnosed with a panic attack for the symptoms brought her to the ED. 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 As it is spontaneously resolving, ready for discharge and outpatient followup. Your levothyroxine was also increased because your TSH was high        To consider follow up with PCP:    -Repeat CBC    -C/w folate    -C/w iron    -Transition to B12 injections weekly    -Levothyroxine increased from 125 to 150. Please f/u TSH    -Restart gabapentin if tolerated

## 2020-08-22 NOTE — PROGRESS NOTE ADULT - PROBLEM SELECTOR PROBLEM 6
Neuropathy Type 2 diabetes mellitus with diabetic neuropathy, without long-term current use of insulin

## 2020-08-22 NOTE — PROGRESS NOTE ADULT - ATTENDING COMMENTS
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 Clinically improved. Awaiting PT eval.     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 As per patient, her gait has significantly improved since last hospitalization. She does  continue to report numbness/tingling and that appeared to be the reason for her presentation to the hospital. On arrival, labs were notable for mild leukocytosis w/ significant bump of platelet to 723. Note that from prev hospitalization: Intrinsic Ab was indeterminant. ANti-parietal cell Ab were positive. Gastrin was normal. Pt was admitted for further eval.     No significant issues overnight. Still reporting numbness/tingling and "tightness" most prominent at the feet. Otherwise, strength remains stable. Case d/w PA @ PCP office. Pt was recently seen on 8/19 with the same complaint. She was given gabapentin at the time.     #B12 neuropathy- Neuropathy sx otherwise stable. Strength has drastically improved compared to previous hospitalization. Her sx are most likely related to B12 deficiency which may take weeks to months for improvement. PT input for dispo recommendations. Anticipate home PT.   #Thrombocytosis -Platelets improved. Per PCP -finding is not new. This will be followed up by PCP. No urgent need to workup at this time. Thrombocytosis could very well be related to anemia.  #Macrocytic anemia - 2/2 b12 def  #Asthma - recommend adherence to Advair BID and albuterol PRN for rescue.     DC home today if cleared by PT. Case discussed with HS. 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 Clinically improved. Awaiting PT eval.     #B12 neuropathy - Neuropathy sx otherwise stable. Strength has drastically improved compared to previous hospitalization. Her sx are most likely related to B12 deficiency which may take weeks to months for improvement. PT input for dispo recommendations. Anticipate home PT  #Thrombocytosis -Platelets improved. Per PCP -finding is not new. This will be followed up by PCP. No urgent need to workup at this time. Thrombocytosis could very well be related to anemia.  #Macrocytic anemia - 2/2 b12 def  #Asthma - recommend adherence to Advair BID and albuterol PRN for rescue.     DC home today if cleared by PT. Case discussed with HS. Time spent 35 mins

## 2020-08-22 NOTE — PROGRESS NOTE ADULT - SUBJECTIVE AND OBJECTIVE BOX
INCOMPLETE NOTE PROGRESS NOTE:     Patient is a 75y old  Female who presents with a chief complaint of Numbness/tingling (22 Aug 2020 11:01)      SUBJECTIVE / OVERNIGHT EVENTS:  No acute complaints  Feels ready to return home  She does not have any more numbness or tingling in her chest and arms.  Her lower extremity weakness and loss of sensation is stable  She has chronic pain in her neck    ADDITIONAL REVIEW OF SYSTEMS:  No fevers, chest pain, shortness of breath, abdominal pain, lower extremity swelling or pain, dysuria, or constipation.    MEDICATIONS  (STANDING):  apixaban 5 milliGRAM(s) Oral every 12 hours  budesonide  80 MICROgram(s)/formoterol 4.5 MICROgram(s) Inhaler 2 Puff(s) Inhalation two times a day  cyanocobalamin Injectable 1000 MICROGram(s) IntraMuscular daily  dextrose 5%. 1000 milliLiter(s) (50 mL/Hr) IV Continuous <Continuous>  dextrose 50% Injectable 12.5 Gram(s) IV Push once  dextrose 50% Injectable 25 Gram(s) IV Push once  dextrose 50% Injectable 25 Gram(s) IV Push once  ferrous    sulfate 325 milliGRAM(s) Oral daily  folic acid 1 milliGRAM(s) Oral daily  insulin lispro (HumaLOG) corrective regimen sliding scale   SubCutaneous three times a day before meals  insulin lispro (HumaLOG) corrective regimen sliding scale   SubCutaneous at bedtime  levothyroxine 150 MICROGram(s) Oral daily  NIFEdipine XL 30 milliGRAM(s) Oral daily  simvastatin 20 milliGRAM(s) Oral at bedtime    MEDICATIONS  (PRN):  ALBUTerol    90 MICROgram(s) HFA Inhaler 2 Puff(s) Inhalation every 6 hours PRN SOB  dextrose 40% Gel 15 Gram(s) Oral once PRN Blood Glucose LESS THAN 70 milliGRAM(s)/deciliter  glucagon  Injectable 1 milliGRAM(s) IntraMuscular once PRN Glucose LESS THAN 70 milligrams/deciliter      CAPILLARY BLOOD GLUCOSE      POCT Blood Glucose.: 98 mg/dL (22 Aug 2020 08:47)  POCT Blood Glucose.: 111 mg/dL (21 Aug 2020 22:28)  POCT Blood Glucose.: 89 mg/dL (21 Aug 2020 17:46)  POCT Blood Glucose.: 78 mg/dL (21 Aug 2020 12:14)    I&O's Summary      PHYSICAL EXAM:  Vital Signs Last 24 Hrs  T(C): 36.7 (22 Aug 2020 06:25), Max: 36.7 (21 Aug 2020 22:06)  T(F): 98.1 (22 Aug 2020 06:25), Max: 98.1 (22 Aug 2020 06:25)  HR: 60 (22 Aug 2020 06:25) (54 - 95)  BP: 122/56 (22 Aug 2020 06:25) (112/56 - 122/56)  BP(mean): --  RR: 17 (22 Aug 2020 06:25) (17 - 18)  SpO2: 100% (22 Aug 2020 06:25) (98% - 100%)    CONSTITUTIONAL: NAD, well-developed  CARDIOVASCULAR: Regular rate and rhythm. Normal S1 and S2. No murmurs.  RESPIRATORY: Normal respiratory effort, Lungs CTAB. Rhonchi on left posterior lung fields and bilateral anterior lung fields.  EXTREMITIES: No REYNA, peripheral pulses intact.  ABDOMEN: Nontender to palpation, normoactive bowel sounds, no rebound/guarding; No hepatosplenomegaly  MUSCLOSKELETAL: no clubbing or cyanosis of digits; no joint swelling or tenderness to palpation  PSYCH: A+O to person, place, and time; affect appropriate  NEURO: Active hip flexion 4/5. Sensation to light touch decreased distally in lower extremities.    LABS:                        9.2    9.91  )-----------( 527      ( 22 Aug 2020 07:00 )             30.4     08-22    138  |  104  |  21  ----------------------------<  79  4.6   |  20<L>  |  0.93    Ca    10.4      22 Aug 2020 07:00  Phos  3.3     08-22  Mg     2.1     08-22    TPro  6.8  /  Alb  3.5  /  TBili  0.4  /  DBili  x   /  AST  17  /  ALT  9   /  AlkPhos  59  08-22          Urinalysis Basic - ( 20 Aug 2020 11:37 )    Color: LT. YELLOW / Appearance: CLEAR / S.011 / pH: 6.5  Gluc: NEGATIVE / Ketone: NEGATIVE  / Bili: NEGATIVE / Urobili: NORMAL   Blood: NEGATIVE / Protein: TRACE / Nitrite: NEGATIVE   Leuk Esterase: TRACE / RBC: x / WBC 0-2   Sq Epi: OCC / Non Sq Epi: x / Bacteria: FEW        Culture - Urine (collected 20 Aug 2020 12:42)  Source: .Urine Clean Catch (Midstream)  Final Report (21 Aug 2020 20:01):    >=3 organisms. Probable collection contamination.        RADIOLOGY & ADDITIONAL TESTS:  Results Reviewed: Thrombocytosis improving. Stable macrocytic anemia.  Imaging Personally Reviewed:  Electrocardiogram Personally Reviewed:    COORDINATION OF CARE:  Care Discussed with Consultants/Other Providers [Y/N]:  Prior or Outpatient Records Reviewed [Y/N]:

## 2020-08-22 NOTE — DISCHARGE NOTE NURSING/CASE MANAGEMENT/SOCIAL WORK - PATIENT PORTAL LINK FT
You can access the FollowMyHealth Patient Portal offered by [facility] by registering at the following website: [email or web address]. By joining [facility]’s FollowMyHealth portal, you will also be able to view your health information using other applications (apps) compatible with our system.

## 2020-08-22 NOTE — DISCHARGE NOTE PROVIDER - NSDCMRMEDTOKEN_GEN_ALL_CORE_FT
Advair Diskus 500 mcg-50 mcg inhalation powder: 1 puff(s) inhaled 2 times a day  apixaban 5 mg oral tablet: 1 tab(s) orally every 12 hours  Flonase 50 mcg/inh nasal spray: 2 spray(s) nasal once a day  gabapentin 300 mg oral capsule: 1 tab(s) orally 2 times a day  levothyroxine 125 mcg (0.125 mg) oral capsule: 1 cap(s) orally once a day  metFORMIN 500 mg oral tablet: 1 tab(s) orally once a day  NIFEdipine 30 mg oral tablet, extended release: 1 tab(s) orally once a day  simvastatin 20 mg oral tablet: 1 tab(s) orally once a day (at bedtime) Advair Diskus 500 mcg-50 mcg inhalation powder: 1 puff(s) inhaled 2 times a day  albuterol 90 mcg/inh inhalation aerosol: 2 puff(s) inhaled every 6 hours, As needed, SOB  apixaban 5 mg oral tablet: 1 tab(s) orally every 12 hours  cyanocobalamin 1000 mcg/mL injectable solution: 1 milliliter(s) injectable once a week   ferrous sulfate 325 mg (65 mg elemental iron) oral tablet: 1 tab(s) orally once a day  folic acid 1 mg oral tablet: 1 tab(s) orally once a day  levothyroxine 150 mcg (0.15 mg) oral tablet: 1 tab(s) orally once a day  metFORMIN 500 mg oral tablet: 1 tab(s) orally once a day  NIFEdipine 30 mg oral tablet, extended release: 1 tab(s) orally once a day  simvastatin 20 mg oral tablet: 1 tab(s) orally once a day (at bedtime)

## 2020-08-22 NOTE — PHYSICAL THERAPY INITIAL EVALUATION ADULT - PERTINENT HX OF CURRENT PROBLEM, REHAB EVAL
presented with diffuse intermittent numbness and tingling in her chest, neck, and bilateral arms, as well as her bilateral lower extremities. She also felt very lethargic and had bilateral tremors associated with feeling anxious, felt lightheaded while walking and when getting up from a sitting position.

## 2020-08-22 NOTE — PROGRESS NOTE ADULT - PROBLEM SELECTOR PLAN 2
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

## 2020-08-22 NOTE — PROGRESS NOTE ADULT - ASSESSMENT
75F PMH of MI 25 years ago, DM, HTN, recent admission for B12 deficiency with LE neurological symptoms, and found to also have bilateral DVTs prior to d/c, started on Eliquis (30 day supply provided by the hospital), presented with diffuse neuropathy concerning for vitamin B12 deficiency, as well as thrombocytosis of 723, down to 590 on AM labs today and with B12 >2000 also on AM labs. Based on the patient's description of events (feeling like everything was closing in and the classic description of "impending doom"), it is likely that she had a panic attack on awakening the AM after initiating gabapentin as rx by her PCP. 75F PMH of MI 25 years ago, DM, HTN, recent admission for B12 deficiency with LE neurological symptoms, and found to also have bilateral DVTs prior to d/c, started on Eliquis (30 day supply provided by the hospital), presented with stable B12 deficiency neuropathy and thrombocytosis that is self resolving.

## 2020-08-25 LAB — T3FREE SERPL-MCNC: 1.19 PG/ML — LOW (ref 1.8–4.6)

## 2020-08-29 ENCOUNTER — INPATIENT (INPATIENT)
Facility: HOSPITAL | Age: 75
LOS: 3 days | Discharge: HOME HEALTH | End: 2020-09-02
Attending: HOSPITALIST | Admitting: HOSPITALIST
Payer: MEDICARE

## 2020-08-29 VITALS
TEMPERATURE: 98 F | HEIGHT: 61 IN | SYSTOLIC BLOOD PRESSURE: 117 MMHG | HEART RATE: 77 BPM | DIASTOLIC BLOOD PRESSURE: 92 MMHG | RESPIRATION RATE: 18 BRPM | OXYGEN SATURATION: 99 %

## 2020-08-29 DIAGNOSIS — Z98.890 OTHER SPECIFIED POSTPROCEDURAL STATES: Chronic | ICD-10-CM

## 2020-08-29 LAB
ALBUMIN SERPL ELPH-MCNC: 4.6 G/DL — SIGNIFICANT CHANGE UP (ref 3.3–5)
ALP SERPL-CCNC: 77 U/L — SIGNIFICANT CHANGE UP (ref 40–120)
ALT FLD-CCNC: 10 U/L — SIGNIFICANT CHANGE UP (ref 4–33)
ANION GAP SERPL CALC-SCNC: 11 MMO/L — SIGNIFICANT CHANGE UP (ref 7–14)
AST SERPL-CCNC: 18 U/L — SIGNIFICANT CHANGE UP (ref 4–32)
BASE EXCESS BLDV CALC-SCNC: 2.3 MMOL/L — SIGNIFICANT CHANGE UP
BASOPHILS # BLD AUTO: 0.16 K/UL — SIGNIFICANT CHANGE UP (ref 0–0.2)
BASOPHILS NFR BLD AUTO: 1.7 % — SIGNIFICANT CHANGE UP (ref 0–2)
BILIRUB SERPL-MCNC: 0.6 MG/DL — SIGNIFICANT CHANGE UP (ref 0.2–1.2)
BLOOD GAS VENOUS - CREATININE: 1.2 MG/DL — SIGNIFICANT CHANGE UP (ref 0.5–1.3)
BUN SERPL-MCNC: 16 MG/DL — SIGNIFICANT CHANGE UP (ref 7–23)
CALCIUM SERPL-MCNC: 10.8 MG/DL — HIGH (ref 8.4–10.5)
CHLORIDE BLDV-SCNC: 109 MMOL/L — HIGH (ref 96–108)
CHLORIDE SERPL-SCNC: 104 MMOL/L — SIGNIFICANT CHANGE UP (ref 98–107)
CO2 SERPL-SCNC: 23 MMOL/L — SIGNIFICANT CHANGE UP (ref 22–31)
CREAT SERPL-MCNC: 1.12 MG/DL — SIGNIFICANT CHANGE UP (ref 0.5–1.3)
EOSINOPHIL # BLD AUTO: 0.47 K/UL — SIGNIFICANT CHANGE UP (ref 0–0.5)
EOSINOPHIL NFR BLD AUTO: 4.9 % — SIGNIFICANT CHANGE UP (ref 0–6)
GAS PNL BLDV: 138 MMOL/L — SIGNIFICANT CHANGE UP (ref 136–146)
GLUCOSE BLDV-MCNC: 90 MG/DL — SIGNIFICANT CHANGE UP (ref 70–99)
GLUCOSE SERPL-MCNC: 92 MG/DL — SIGNIFICANT CHANGE UP (ref 70–99)
HCO3 BLDV-SCNC: 25 MMOL/L — SIGNIFICANT CHANGE UP (ref 20–27)
HCT VFR BLD CALC: 32.3 % — LOW (ref 34.5–45)
HCT VFR BLDV CALC: 31.8 % — LOW (ref 34.5–45)
HGB BLD-MCNC: 10.5 G/DL — LOW (ref 11.5–15.5)
HGB BLDV-MCNC: 10.3 G/DL — LOW (ref 11.5–15.5)
IMM GRANULOCYTES NFR BLD AUTO: 0.4 % — SIGNIFICANT CHANGE UP (ref 0–1.5)
LACTATE BLDV-MCNC: 1.6 MMOL/L — SIGNIFICANT CHANGE UP (ref 0.5–2)
LYMPHOCYTES # BLD AUTO: 3.1 K/UL — SIGNIFICANT CHANGE UP (ref 1–3.3)
LYMPHOCYTES # BLD AUTO: 32.1 % — SIGNIFICANT CHANGE UP (ref 13–44)
MCHC RBC-ENTMCNC: 32.5 % — SIGNIFICANT CHANGE UP (ref 32–36)
MCHC RBC-ENTMCNC: 35.1 PG — HIGH (ref 27–34)
MCV RBC AUTO: 108 FL — HIGH (ref 80–100)
MONOCYTES # BLD AUTO: 0.55 K/UL — SIGNIFICANT CHANGE UP (ref 0–0.9)
MONOCYTES NFR BLD AUTO: 5.7 % — SIGNIFICANT CHANGE UP (ref 2–14)
NEUTROPHILS # BLD AUTO: 5.33 K/UL — SIGNIFICANT CHANGE UP (ref 1.8–7.4)
NEUTROPHILS NFR BLD AUTO: 55.2 % — SIGNIFICANT CHANGE UP (ref 43–77)
NRBC # FLD: 0 K/UL — SIGNIFICANT CHANGE UP (ref 0–0)
PCO2 BLDV: 48 MMHG — SIGNIFICANT CHANGE UP (ref 41–51)
PH BLDV: 7.37 PH — SIGNIFICANT CHANGE UP (ref 7.32–7.43)
PLATELET # BLD AUTO: 457 K/UL — HIGH (ref 150–400)
PMV BLD: 10.7 FL — SIGNIFICANT CHANGE UP (ref 7–13)
PO2 BLDV: < 24 MMHG — LOW (ref 35–40)
POTASSIUM BLDV-SCNC: 4.2 MMOL/L — SIGNIFICANT CHANGE UP (ref 3.4–4.5)
POTASSIUM SERPL-MCNC: 3.7 MMOL/L — SIGNIFICANT CHANGE UP (ref 3.5–5.3)
POTASSIUM SERPL-SCNC: 3.7 MMOL/L — SIGNIFICANT CHANGE UP (ref 3.5–5.3)
PROT SERPL-MCNC: 8.4 G/DL — HIGH (ref 6–8.3)
RBC # BLD: 2.99 M/UL — LOW (ref 3.8–5.2)
RBC # FLD: 19.1 % — HIGH (ref 10.3–14.5)
SAO2 % BLDV: 29.1 % — LOW (ref 60–85)
SODIUM SERPL-SCNC: 138 MMOL/L — SIGNIFICANT CHANGE UP (ref 135–145)
TROPONIN T, HIGH SENSITIVITY: 8 NG/L — SIGNIFICANT CHANGE UP (ref ?–14)
WBC # BLD: 9.65 K/UL — SIGNIFICANT CHANGE UP (ref 3.8–10.5)
WBC # FLD AUTO: 9.65 K/UL — SIGNIFICANT CHANGE UP (ref 3.8–10.5)

## 2020-08-29 PROCEDURE — 99285 EMERGENCY DEPT VISIT HI MDM: CPT

## 2020-08-29 PROCEDURE — 71045 X-RAY EXAM CHEST 1 VIEW: CPT | Mod: 26

## 2020-08-29 RX ORDER — ALBUTEROL 90 UG/1
2 AEROSOL, METERED ORAL EVERY 4 HOURS
Refills: 0 | Status: COMPLETED | OUTPATIENT
Start: 2020-08-29 | End: 2021-07-28

## 2020-08-29 RX ORDER — ALBUTEROL 90 UG/1
2 AEROSOL, METERED ORAL EVERY 4 HOURS
Refills: 0 | Status: DISCONTINUED | OUTPATIENT
Start: 2020-08-29 | End: 2020-08-30

## 2020-08-29 RX ORDER — ACETAMINOPHEN 500 MG
975 TABLET ORAL ONCE
Refills: 0 | Status: COMPLETED | OUTPATIENT
Start: 2020-08-29 | End: 2020-08-29

## 2020-08-29 RX ADMIN — ALBUTEROL 2 PUFF(S): 90 AEROSOL, METERED ORAL at 22:28

## 2020-08-29 RX ADMIN — Medication 975 MILLIGRAM(S): at 23:14

## 2020-08-29 RX ADMIN — Medication 100 MILLIGRAM(S): at 22:28

## 2020-08-29 RX ADMIN — Medication 975 MILLIGRAM(S): at 23:44

## 2020-08-29 NOTE — ED ADULT NURSE REASSESSMENT NOTE - NS ED NURSE REASSESS COMMENT FT1
Pt c/o increasing abdominal pain since being in triage. VSS. EKG obtained at this time. Pt to be taken to room 19 when ready

## 2020-08-29 NOTE — ED PROVIDER NOTE - PMH
Diabetes  Type 2 diabetes  DVT (deep venous thrombosis), right  Bilateral LE DVT  Essential hypertension    Hypothyroid    Psoriasis    Vitiligo

## 2020-08-29 NOTE — ED ADULT NURSE NOTE - CHIEF COMPLAINT QUOTE
Pt c/o generalized abdominal pain x1 day. Last BM yesterday, denies N/V, CP, SOB, fever, blood in stool. PMH DM. Appears restless and uncomfortable

## 2020-08-29 NOTE — ED PROVIDER NOTE - PHYSICAL EXAMINATION
Const: Well-nourished, Well-developed, appearing stated age.  Eyes: no conjunctival injection, and symmetrical lids.  HEENT: Head NCAT, no lesions. Atraumatic external nose and ears. Moist MM.  Neck: Symmetric, trachea midline.   CVS: +S1/S2, Peripheral pulses 2+ and equal in all extremities.  RESP: Unlabored respiratory effort. Clear to auscultation bilaterally.  GI: Nontender/Nondistended, No CVA tenderness b/l.   MSK: Normocephalic/Atraumatic, Lower Extremities w/o calf tenderness or edema b/l.   Skin: +warm, erythematous skin under b/l breasts   Neuro: CNs II-XII grossly intact. Motor & Sensation grossly intact.  Psych: Awake, Alert, & Oriented (AAO) x3. Appropriate mood and affect.

## 2020-08-29 NOTE — ED PROVIDER NOTE - CLINICAL SUMMARY MEDICAL DECISION MAKING FREE TEXT BOX
75F presenting with CC of abd pain/itching/redness PE: VSS, cellulitic changes under breasts Ddx: Cellulitis, likely uncomplicated Plan: Labs, antibiotics

## 2020-08-29 NOTE — ED PROVIDER NOTE - NS ED ROS FT
CONST: no fevers, no chills, no trauma  EYES: no pain, no blurry vision   ENT: no sore throat, no epistaxis, no rhinorrhea  CV: no chest pain, no palpitations, no orthopnea, no extremity pain or swelling  RESP: no shortness of breath, no cough, no sputum, no pleurisy, no wheezing  ABD: + abdominal pain, no nausea, no vomiting, no diarrhea, no black or bloody stool  : no dysuria, no hematuria, no frequency, no urgency  MSK: no back pain, no neck pain, no extremity pain  NEURO: no headache, no sensory disturbances, no focal weakness, no dizziness  HEME: no easy bleeding or bruising  SKIN: no diaphoresis, no rash

## 2020-08-29 NOTE — ED ADULT NURSE NOTE - OBJECTIVE STATEMENT
Pt presents to RM 19 AO4 complaining of mid epigastric pain radiating down L side. Pt states "it feels like somethings sitting on me." Appears uncomfortable, tachypneic to 25RR. Pt states the pain began this AM and worsened throughout day. No sig cardiac Hx, does have essential HTN. Pt denies any n/v/d, lightheadedness or headache. Vitals as documented, 18g placed to R AC labs drawn and sent, EKG performed at triage and seen by MD Dye, placed on cardiac monitor in NSR, will cont to monitor. Pt presents to RM 19 AO4 complaining of mid epigastric pain radiating down L side. Pt states "it feels like somethings sitting on me." Appears uncomfortable, tachypneic to 25RR. Pt states the pain began this AM and worsened throughout day. No sig cardiac Hx, does have essential HTN. Pt chest wall underneath BL breasts appears red and warm to touch, does not appear to be MAD. Pt denies any n/v/d, lightheadedness or headache. Vitals as documented, 18g placed to R AC labs drawn and sent, EKG performed at triage and seen by MD Dye, placed on cardiac monitor in NSR, will cont to monitor.

## 2020-08-29 NOTE — ED PROVIDER NOTE - ATTENDING CONTRIBUTION TO CARE
I have seen and examined the patient on the patient´s visit date. I have reviewed the note written by Tiffany Connolly DO on that visit day. I have supervised and participated as necessary in the performance of procedures indicated for patient management and was available at all phases of the patient´s visit when needed. We discussed the history, physical exam findings, mnagement plan, and  medical decision making. I have made my additons, exceptions, and revisions within the chart and I agree with H and P as documented in its entirety. The data and my interpretation of any data collected from labs, interventions and imaging appear below as well as my independent medical decision making and considerations    The patient is a 75y Female who has a past medical and surgery history of Psoriasis Vitiligo DVT Hypothyroid Diabetes: Type 2 diabetes Essential hypertension S/P hernia surgery PTED with chest wall/upper abd pain induced by reddened inflamed area as c/w cellulitis as described  Vital Signs Last 24 Hrs  T(F): 98.5 HR: 60 BP: 132/74 RR: 24 SpO2: 100% (29 Aug 2020 20:13) (99% - 100%)  PE: as described; my additions and exceptions are noted in the chart  DATA:   EKG: NSR@62; NSST  Lab Results:                        10.5   9.65  )-----------( 457      ( 29 Aug 2020 21:07 )             32.3   Mean Cell Volume: 108.0 fL (08-29-20 @ 21:07) Auto Neutrophil %: 55.2 % (08-29-20 @ 21:07) Auto Eosinophil %: 4.9 % (08-29-20 @ 21:07)  08-29    138  |  104  |  16  ----------------------------<  92  3.7   |  23  |  1.12    Ca    10.8<H>      29 Aug 2020 21:07  Phos  2.2     08-29  Mg     2.1     08-29  TPro  8.4<H>  /  Alb  4.6  /  TBili  0.6  /  DBili  x   /  AST  18  /  ALT  10  /  AlkPhos  77  08-29    IMAGING:  MDM:  IMP (IRISK):  Management (Plan):  Plan   IV antibiotics   admission   will cover mrsa and dm lesions with clinda/rocephin

## 2020-08-29 NOTE — ED ADULT NURSE NOTE - NSIMPLEMENTINTERV_GEN_ALL_ED
Implemented All Fall Risk Interventions:  [place] to call system. Call bell, personal items and telephone within reach. Instruct patient to call for assistance. Room bathroom lighting operational. Non-slip footwear when patient is off stretcher. Physically safe environment: no spills, clutter or unnecessary equipment. Stretcher in lowest position, wheels locked, appropriate side rails in place. Provide visual cue, wrist band, yellow gown, etc. Monitor gait and stability. Monitor for mental status changes and reorient to person, place, and time. Review medications for side effects contributing to fall risk. Reinforce activity limits and safety measures with patient and family.

## 2020-08-29 NOTE — ED ADULT TRIAGE NOTE - CHIEF COMPLAINT QUOTE
Pt c/o generalized abdominal pain x1 day. Last BM yesterday, denies N/V, CP, SOB, fever, blood in stool. PMH DM. Pt c/o generalized abdominal pain x1 day. Last BM yesterday, denies N/V, CP, SOB, fever, blood in stool. PMH DM. Appears restless and uncomfortable

## 2020-08-29 NOTE — ED PROVIDER NOTE - OBJECTIVE STATEMENT
75F pmhx of htn, dm, asthma, psoriasis presenting with CC of b/l upper quadrant pain, erythema, pruritis. Recently discharged from the hospital for admission for thrombocytosis. Denies any changes in soaps, clothing. +SOB, no chest pain, no cough or wheezes. No f/c/n/v. +constipation, no diarrhea. No changes in meds,     PCP: Dr. Melonie Lawton

## 2020-08-30 DIAGNOSIS — R10.9 UNSPECIFIED ABDOMINAL PAIN: ICD-10-CM

## 2020-08-30 DIAGNOSIS — I82.409 ACUTE EMBOLISM AND THROMBOSIS OF UNSPECIFIED DEEP VEINS OF UNSPECIFIED LOWER EXTREMITY: ICD-10-CM

## 2020-08-30 DIAGNOSIS — G62.9 POLYNEUROPATHY, UNSPECIFIED: ICD-10-CM

## 2020-08-30 DIAGNOSIS — E11.9 TYPE 2 DIABETES MELLITUS WITHOUT COMPLICATIONS: ICD-10-CM

## 2020-08-30 DIAGNOSIS — J45.909 UNSPECIFIED ASTHMA, UNCOMPLICATED: ICD-10-CM

## 2020-08-30 DIAGNOSIS — L30.4 ERYTHEMA INTERTRIGO: ICD-10-CM

## 2020-08-30 DIAGNOSIS — I10 ESSENTIAL (PRIMARY) HYPERTENSION: ICD-10-CM

## 2020-08-30 DIAGNOSIS — E03.9 HYPOTHYROIDISM, UNSPECIFIED: ICD-10-CM

## 2020-08-30 DIAGNOSIS — R07.9 CHEST PAIN, UNSPECIFIED: ICD-10-CM

## 2020-08-30 DIAGNOSIS — S31.000A UNSPECIFIED OPEN WOUND OF LOWER BACK AND PELVIS WITHOUT PENETRATION INTO RETROPERITONEUM, INITIAL ENCOUNTER: ICD-10-CM
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
## 2020-08-30 PROBLEM — L80 VITILIGO: Chronic | Status: ACTIVE | Noted: 2020-08-20

## 2020-08-30 PROBLEM — L40.9 PSORIASIS, UNSPECIFIED: Chronic | Status: ACTIVE | Noted: 2020-08-20

## 2020-08-30 LAB
ALBUMIN SERPL ELPH-MCNC: 3.7 G/DL — SIGNIFICANT CHANGE UP (ref 3.3–5)
ALP SERPL-CCNC: 64 U/L — SIGNIFICANT CHANGE UP (ref 40–120)
ALT FLD-CCNC: 11 U/L — SIGNIFICANT CHANGE UP (ref 4–33)
ANION GAP SERPL CALC-SCNC: 13 MMO/L — SIGNIFICANT CHANGE UP (ref 7–14)
ANION GAP SERPL CALC-SCNC: 14 MMO/L — SIGNIFICANT CHANGE UP (ref 7–14)
APTT BLD: 29.3 SEC — SIGNIFICANT CHANGE UP (ref 27–36.3)
AST SERPL-CCNC: 31 U/L — SIGNIFICANT CHANGE UP (ref 4–32)
BASOPHILS # BLD AUTO: 0.13 K/UL — SIGNIFICANT CHANGE UP (ref 0–0.2)
BASOPHILS NFR BLD AUTO: 1.6 % — SIGNIFICANT CHANGE UP (ref 0–2)
BILIRUB SERPL-MCNC: 0.6 MG/DL — SIGNIFICANT CHANGE UP (ref 0.2–1.2)
BUN SERPL-MCNC: 13 MG/DL — SIGNIFICANT CHANGE UP (ref 7–23)
BUN SERPL-MCNC: 14 MG/DL — SIGNIFICANT CHANGE UP (ref 7–23)
CA-I BLD-SCNC: 1.23 MMOL/L — SIGNIFICANT CHANGE UP (ref 1.03–1.23)
CALCIUM SERPL-MCNC: 10.3 MG/DL — SIGNIFICANT CHANGE UP (ref 8.4–10.5)
CALCIUM SERPL-MCNC: 10.4 MG/DL — SIGNIFICANT CHANGE UP (ref 8.4–10.5)
CHLORIDE SERPL-SCNC: 104 MMOL/L — SIGNIFICANT CHANGE UP (ref 98–107)
CHLORIDE SERPL-SCNC: 104 MMOL/L — SIGNIFICANT CHANGE UP (ref 98–107)
CO2 SERPL-SCNC: 20 MMOL/L — LOW (ref 22–31)
CO2 SERPL-SCNC: 22 MMOL/L — SIGNIFICANT CHANGE UP (ref 22–31)
CREAT SERPL-MCNC: 0.9 MG/DL — SIGNIFICANT CHANGE UP (ref 0.5–1.3)
CREAT SERPL-MCNC: 0.94 MG/DL — SIGNIFICANT CHANGE UP (ref 0.5–1.3)
EOSINOPHIL # BLD AUTO: 0.64 K/UL — HIGH (ref 0–0.5)
EOSINOPHIL NFR BLD AUTO: 7.8 % — HIGH (ref 0–6)
GLUCOSE SERPL-MCNC: 88 MG/DL — SIGNIFICANT CHANGE UP (ref 70–99)
GLUCOSE SERPL-MCNC: 93 MG/DL — SIGNIFICANT CHANGE UP (ref 70–99)
HCT VFR BLD CALC: 30.6 % — LOW (ref 34.5–45)
HGB BLD-MCNC: 9.5 G/DL — LOW (ref 11.5–15.5)
IMM GRANULOCYTES NFR BLD AUTO: 0.1 % — SIGNIFICANT CHANGE UP (ref 0–1.5)
INR BLD: 1.4 — HIGH (ref 0.88–1.16)
LYMPHOCYTES # BLD AUTO: 2.22 K/UL — SIGNIFICANT CHANGE UP (ref 1–3.3)
LYMPHOCYTES # BLD AUTO: 27.2 % — SIGNIFICANT CHANGE UP (ref 13–44)
MAGNESIUM SERPL-MCNC: 2.3 MG/DL — SIGNIFICANT CHANGE UP (ref 1.6–2.6)
MAGNESIUM SERPL-MCNC: 2.3 MG/DL — SIGNIFICANT CHANGE UP (ref 1.6–2.6)
MCHC RBC-ENTMCNC: 31 % — LOW (ref 32–36)
MCHC RBC-ENTMCNC: 33.9 PG — SIGNIFICANT CHANGE UP (ref 27–34)
MCV RBC AUTO: 109.3 FL — HIGH (ref 80–100)
MONOCYTES # BLD AUTO: 0.53 K/UL — SIGNIFICANT CHANGE UP (ref 0–0.9)
MONOCYTES NFR BLD AUTO: 6.5 % — SIGNIFICANT CHANGE UP (ref 2–14)
NEUTROPHILS # BLD AUTO: 4.64 K/UL — SIGNIFICANT CHANGE UP (ref 1.8–7.4)
NEUTROPHILS NFR BLD AUTO: 56.8 % — SIGNIFICANT CHANGE UP (ref 43–77)
NRBC # FLD: 0 K/UL — SIGNIFICANT CHANGE UP (ref 0–0)
PHOSPHATE SERPL-MCNC: 3 MG/DL — SIGNIFICANT CHANGE UP (ref 2.5–4.5)
PHOSPHATE SERPL-MCNC: 3.3 MG/DL — SIGNIFICANT CHANGE UP (ref 2.5–4.5)
PLATELET # BLD AUTO: 392 K/UL — SIGNIFICANT CHANGE UP (ref 150–400)
PMV BLD: 11 FL — SIGNIFICANT CHANGE UP (ref 7–13)
POTASSIUM SERPL-MCNC: 5.3 MMOL/L — SIGNIFICANT CHANGE UP (ref 3.5–5.3)
POTASSIUM SERPL-MCNC: 5.9 MMOL/L — HIGH (ref 3.5–5.3)
POTASSIUM SERPL-SCNC: 5.3 MMOL/L — SIGNIFICANT CHANGE UP (ref 3.5–5.3)
POTASSIUM SERPL-SCNC: 5.9 MMOL/L — HIGH (ref 3.5–5.3)
PROT SERPL-MCNC: 7.6 G/DL — SIGNIFICANT CHANGE UP (ref 6–8.3)
PROTHROM AB SERPL-ACNC: 15.7 SEC — HIGH (ref 10.6–13.6)
PTH-INTACT SERPL-MCNC: 131 PG/ML — HIGH (ref 15–65)
RBC # BLD: 2.8 M/UL — LOW (ref 3.8–5.2)
RBC # FLD: 19.1 % — HIGH (ref 10.3–14.5)
SARS-COV-2 IGG SERPL QL IA: NEGATIVE — SIGNIFICANT CHANGE UP
SARS-COV-2 IGM SERPL IA-ACNC: 0.08 INDEX — SIGNIFICANT CHANGE UP
SARS-COV-2 RNA SPEC QL NAA+PROBE: SIGNIFICANT CHANGE UP
SODIUM SERPL-SCNC: 138 MMOL/L — SIGNIFICANT CHANGE UP (ref 135–145)
SODIUM SERPL-SCNC: 139 MMOL/L — SIGNIFICANT CHANGE UP (ref 135–145)
TROPONIN T, HIGH SENSITIVITY: 8 NG/L — SIGNIFICANT CHANGE UP (ref ?–14)
WBC # BLD: 8.17 K/UL — SIGNIFICANT CHANGE UP (ref 3.8–10.5)
WBC # FLD AUTO: 8.17 K/UL — SIGNIFICANT CHANGE UP (ref 3.8–10.5)

## 2020-08-30 PROCEDURE — 73552 X-RAY EXAM OF FEMUR 2/>: CPT | Mod: 26,LT

## 2020-08-30 PROCEDURE — 99223 1ST HOSP IP/OBS HIGH 75: CPT

## 2020-08-30 PROCEDURE — 12345: CPT | Mod: NC

## 2020-08-30 PROCEDURE — 73502 X-RAY EXAM HIP UNI 2-3 VIEWS: CPT | Mod: 26,LT

## 2020-08-30 RX ORDER — INSULIN LISPRO 100/ML
VIAL (ML) SUBCUTANEOUS AT BEDTIME
Refills: 0 | Status: DISCONTINUED | OUTPATIENT
Start: 2020-08-30 | End: 2020-08-30

## 2020-08-30 RX ORDER — DEXTROSE 50 % IN WATER 50 %
12.5 SYRINGE (ML) INTRAVENOUS ONCE
Refills: 0 | Status: DISCONTINUED | OUTPATIENT
Start: 2020-08-30 | End: 2020-08-30

## 2020-08-30 RX ORDER — SIMVASTATIN 20 MG/1
20 TABLET, FILM COATED ORAL AT BEDTIME
Refills: 0 | Status: DISCONTINUED | OUTPATIENT
Start: 2020-08-30 | End: 2020-09-02

## 2020-08-30 RX ORDER — DEXTROSE 50 % IN WATER 50 %
25 SYRINGE (ML) INTRAVENOUS ONCE
Refills: 0 | Status: DISCONTINUED | OUTPATIENT
Start: 2020-08-30 | End: 2020-08-30

## 2020-08-30 RX ORDER — POTASSIUM PHOSPHATE, MONOBASIC POTASSIUM PHOSPHATE, DIBASIC 236; 224 MG/ML; MG/ML
15 INJECTION, SOLUTION INTRAVENOUS ONCE
Refills: 0 | Status: COMPLETED | OUTPATIENT
Start: 2020-08-30 | End: 2020-08-30

## 2020-08-30 RX ORDER — PREGABALIN 225 MG/1
1000 CAPSULE ORAL DAILY
Refills: 0 | Status: DISCONTINUED | OUTPATIENT
Start: 2020-08-30 | End: 2020-08-30

## 2020-08-30 RX ORDER — GLUCAGON INJECTION, SOLUTION 0.5 MG/.1ML
1 INJECTION, SOLUTION SUBCUTANEOUS ONCE
Refills: 0 | Status: DISCONTINUED | OUTPATIENT
Start: 2020-08-30 | End: 2020-08-30

## 2020-08-30 RX ORDER — ALBUTEROL 90 UG/1
2 AEROSOL, METERED ORAL EVERY 6 HOURS
Refills: 0 | Status: DISCONTINUED | OUTPATIENT
Start: 2020-08-30 | End: 2020-09-02

## 2020-08-30 RX ORDER — NYSTATIN CREAM 100000 [USP'U]/G
1 CREAM TOPICAL
Refills: 0 | Status: DISCONTINUED | OUTPATIENT
Start: 2020-08-30 | End: 2020-09-02

## 2020-08-30 RX ORDER — BUDESONIDE AND FORMOTEROL FUMARATE DIHYDRATE 160; 4.5 UG/1; UG/1
2 AEROSOL RESPIRATORY (INHALATION)
Refills: 0 | Status: DISCONTINUED | OUTPATIENT
Start: 2020-08-30 | End: 2020-09-02

## 2020-08-30 RX ORDER — POLYETHYLENE GLYCOL 3350 17 G/17G
17 POWDER, FOR SOLUTION ORAL DAILY
Refills: 0 | Status: DISCONTINUED | OUTPATIENT
Start: 2020-08-30 | End: 2020-09-02

## 2020-08-30 RX ORDER — APIXABAN 2.5 MG/1
5 TABLET, FILM COATED ORAL EVERY 12 HOURS
Refills: 0 | Status: DISCONTINUED | OUTPATIENT
Start: 2020-08-30 | End: 2020-09-02

## 2020-08-30 RX ORDER — FERROUS SULFATE 325(65) MG
325 TABLET ORAL DAILY
Refills: 0 | Status: DISCONTINUED | OUTPATIENT
Start: 2020-08-30 | End: 2020-09-02

## 2020-08-30 RX ORDER — FOLIC ACID 0.8 MG
1 TABLET ORAL DAILY
Refills: 0 | Status: DISCONTINUED | OUTPATIENT
Start: 2020-08-30 | End: 2020-09-02

## 2020-08-30 RX ORDER — NIFEDIPINE 30 MG
30 TABLET, EXTENDED RELEASE 24 HR ORAL DAILY
Refills: 0 | Status: DISCONTINUED | OUTPATIENT
Start: 2020-08-30 | End: 2020-09-02

## 2020-08-30 RX ORDER — SODIUM CHLORIDE 9 MG/ML
1000 INJECTION, SOLUTION INTRAVENOUS
Refills: 0 | Status: DISCONTINUED | OUTPATIENT
Start: 2020-08-30 | End: 2020-08-30

## 2020-08-30 RX ORDER — ACETAMINOPHEN 500 MG
650 TABLET ORAL EVERY 6 HOURS
Refills: 0 | Status: DISCONTINUED | OUTPATIENT
Start: 2020-08-30 | End: 2020-09-02

## 2020-08-30 RX ORDER — SENNA PLUS 8.6 MG/1
2 TABLET ORAL AT BEDTIME
Refills: 0 | Status: DISCONTINUED | OUTPATIENT
Start: 2020-08-30 | End: 2020-09-02

## 2020-08-30 RX ORDER — LEVOTHYROXINE SODIUM 125 MCG
150 TABLET ORAL DAILY
Refills: 0 | Status: DISCONTINUED | OUTPATIENT
Start: 2020-08-30 | End: 2020-09-02

## 2020-08-30 RX ORDER — DEXTROSE 50 % IN WATER 50 %
15 SYRINGE (ML) INTRAVENOUS ONCE
Refills: 0 | Status: DISCONTINUED | OUTPATIENT
Start: 2020-08-30 | End: 2020-08-30

## 2020-08-30 RX ORDER — INSULIN LISPRO 100/ML
VIAL (ML) SUBCUTANEOUS
Refills: 0 | Status: DISCONTINUED | OUTPATIENT
Start: 2020-08-30 | End: 2020-08-30

## 2020-08-30 RX ADMIN — BUDESONIDE AND FORMOTEROL FUMARATE DIHYDRATE 2 PUFF(S): 160; 4.5 AEROSOL RESPIRATORY (INHALATION) at 21:44

## 2020-08-30 RX ADMIN — Medication 150 MICROGRAM(S): at 06:27

## 2020-08-30 RX ADMIN — POTASSIUM PHOSPHATE, MONOBASIC POTASSIUM PHOSPHATE, DIBASIC 62.5 MILLIMOLE(S): 236; 224 INJECTION, SOLUTION INTRAVENOUS at 06:28

## 2020-08-30 RX ADMIN — NYSTATIN CREAM 1 APPLICATION(S): 100000 CREAM TOPICAL at 06:27

## 2020-08-30 RX ADMIN — APIXABAN 5 MILLIGRAM(S): 2.5 TABLET, FILM COATED ORAL at 17:50

## 2020-08-30 RX ADMIN — Medication 30 MILLIGRAM(S): at 06:27

## 2020-08-30 RX ADMIN — SIMVASTATIN 20 MILLIGRAM(S): 20 TABLET, FILM COATED ORAL at 21:43

## 2020-08-30 RX ADMIN — Medication 325 MILLIGRAM(S): at 11:19

## 2020-08-30 RX ADMIN — NYSTATIN CREAM 1 APPLICATION(S): 100000 CREAM TOPICAL at 17:50

## 2020-08-30 RX ADMIN — Medication 1 MILLIGRAM(S): at 11:19

## 2020-08-30 RX ADMIN — APIXABAN 5 MILLIGRAM(S): 2.5 TABLET, FILM COATED ORAL at 06:27

## 2020-08-30 RX ADMIN — BUDESONIDE AND FORMOTEROL FUMARATE DIHYDRATE 2 PUFF(S): 160; 4.5 AEROSOL RESPIRATORY (INHALATION) at 09:52

## 2020-08-30 NOTE — H&P ADULT - PROBLEM SELECTOR PLAN 10
fall precautions, PT consult  xrays pending fall precautions, PT consult  xrays pending  checking orthostatics, TTE

## 2020-08-30 NOTE — H&P ADULT - HISTORY OF PRESENT ILLNESS
75-year-old female with NIDDM2, HTN, and recent admission for B12 deficiency 2/2 atrophic gastritis complicated by lower extremity neuropathy and bilateral DVTs presenting with sudden onset of chest tightness associated with shortness of breath and palpitations, lasting approximately 2 hours, somewhat relieved with acetaminophen. Pain is non-radiating, inframammary in origin, worse with palpation. Patient reports a remote history of MI, states pain is dissimilar. Pain was rated 10/10 and started after taking a new prescription given to her by her PMD. Patient notes recent rash that also started yesterday under both breasts and in waistline that is both painful and pruritic.  She also notes sores on her backside after her fall.	    Patient reports a recent fall on Sunday (day after recent hospital discharge) in her kitchen, landing on her buttocks after an episode of dizziness. She denies any head trauma or syncope at that time.    In the ED VS: 98.5  60-77  117-135/74-92  18-24  %RA, received albuterol inhaler, acetaminophen 975mg PO x1, clindamycin 900mg IV x1

## 2020-08-30 NOTE — PROGRESS NOTE ADULT - PROBLEM SELECTOR PLAN 10
Xray L. hip, pelvis, femur no evidence of acute fracture  fall precautions, PT rec home w/ home PT  checking orthostatics, TTE

## 2020-08-30 NOTE — H&P ADULT - PROBLEM SELECTOR PLAN 5
c/w levothyroxine   TSH 13.88 uIU/mL (08.20.20 @ 10:53)  dose increased on last admission from 125 to 150mcg, repeat TSH as outpatient in 4-6wks to assess for response

## 2020-08-30 NOTE — H&P ADULT - PROBLEM SELECTOR PLAN 1
resolved, possibly related to intertrigo as appears likely more superficial pain  new EKG TWI, trop (-)x1, will repeat now, monitor on tele  fall on Sunday associated with dizziness, check TTE

## 2020-08-30 NOTE — H&P ADULT - NSHPREVIEWOFSYSTEMS_GEN_ALL_CORE
REVIEW OF SYSTEMS:    CONSTITUTIONAL: (+) weakness, No fevers or chills  EYES/ENT: No visual changes;  No dysphagia  NECK: (+) pain; No stiffness  RESPIRATORY: No cough, wheezing, hemoptysis; (+) shortness of breath  CARDIOVASCULAR: No chest pain or palpitations; No lower extremity edema  GASTROINTESTINAL: No abdominal or epigastric pain. No nausea, vomiting, or hematemesis; No diarrhea; (+) constipation. No melena or hematochezia.  GENITOURINARY: No dysuria, frequency or hematuria  NEUROLOGICAL: diffuse paresthesias and decrease sensation b/l legs to groin; reports some groin paresthesias as well; denies fecal or urinary incontinence  MSK: weakness on hip flexion secondary to pain, ambulates with walker, recent fall as above, pain along length of L thigh to hip on flexion  SKIN: (+)pruritis/pain under breasts and along waistline with associated rash; lesion on buttocks   All other review of systems is negative unless indicated above. REVIEW OF SYSTEMS:    CONSTITUTIONAL: (+) weakness, No fevers or chills  EYES/ENT: No visual changes;  No dysphagia  NECK: (+) pain; No stiffness  RESPIRATORY: No cough, wheezing, hemoptysis; (+) shortness of breath  CARDIOVASCULAR: No chest pain or palpitations; No lower extremity edema  GASTROINTESTINAL: No abdominal or epigastric pain. No nausea, vomiting, or hematemesis; No diarrhea; (+) constipation. No melena or hematochezia.  GENITOURINARY: No dysuria, frequency or hematuria  NEUROLOGICAL: diffuse paresthesias and decrease sensation b/l legs to groin; reports some groin paresthesias as well; denies fecal or urinary incontinence (patient notes symptoms ongoing xweeks)  MSK: weakness on hip flexion secondary to pain, ambulates with walker, recent fall as above, pain along length of L thigh to hip on flexion  SKIN: (+)pruritis/pain under breasts and along waistline with associated rash; lesion on buttocks   All other review of systems is negative unless indicated above.

## 2020-08-30 NOTE — PROGRESS NOTE ADULT - PROBLEM SELECTOR PLAN 1
resolved, possibly related to intertrigo as appears likely more superficial pain  new EKG TWI, trop (-)x2, monitor on tele  fall on Sunday associated with dizziness, check TTE

## 2020-08-30 NOTE — H&P ADULT - PROBLEM SELECTOR PLAN 2
-On weekly B12 injections given atrophic gastritis with positive parietal cell antibodies and an indeterminate intrinsic factor on Aug 5th  -B12 >2000 per AM lab on 8/21; stable macrocytic anemia  -given persistence of symptoms that patient reports have only been present after a fall a few weeks ago, would consider neuro consult, CT L spine from prior admission reviewed -On weekly B12 injections given atrophic gastritis with positive parietal cell antibodies and an indeterminate intrinsic factor on Aug 5th  -B12 >2000 per AM lab on 8/21  -given persistence of symptoms that patient reports have only been present after a fall a few weeks ago, would consider neuro consult, CT L spine from prior admission reviewed -On weekly B12 injections given atrophic gastritis with positive parietal cell antibodies and an indeterminate intrinsic factor on Aug 5th  -B12 >2000 per AM lab on 8/21  -persistence of symptoms that patient reports have only been present after a fall a few weeks ago, seen by neuro 8/09/20 on prior admission, symptoms thought to be secondary to B12 neuropathy, CT L spine from prior admission reviewed; patient very concerned about her symptoms, consider outpatient neuro f/u for reassurance

## 2020-08-30 NOTE — H&P ADULT - NSHPLABSRESULTS_GEN_ALL_CORE
10.5   9.65  )-----------( 457      ( 29 Aug 2020 21:07 )             32.3     08-29    138  |  104  |  16  ----------------------------<  92  3.7   |  23  |  1.12    Ca    10.8<H>      29 Aug 2020 21:07  Phos  2.2     08-29  Mg     2.1     08-29    TPro  8.4<H>  /  Alb  4.6  /  TBili  0.6  /  DBili  x   /  AST  18  /  ALT  10  /  AlkPhos  77  08-29    23:00 - VBG - pH: 7.37  | pCO2: 48    | pO2: < 24  | Lactate: 1.6      Troponin T, High Sensitivity: 8 ng/L (08.29.20 @ 21:07)    CXR personally reviewed - no focal consolidations    EKG personally reviewed - 62bpm NS, short MT, TWI V2-V3 (new), QTc 408ms

## 2020-08-30 NOTE — H&P ADULT - ASSESSMENT
75-year-old female with NIDDM2, HTN, and recent admission for B12 deficiency 2/2 atrophic gastritis complicated by lower extremity neuropathy and bilateral DVTs presenting with sudden onset of chest tightness associated with shortness of breath and palpitations

## 2020-08-30 NOTE — PHYSICAL THERAPY INITIAL EVALUATION ADULT - PERTINENT HX OF CURRENT PROBLEM, REHAB EVAL
Patient is 75 year old female admitted with history of NIDDM2, HTN, and recent admission for B12 deficiency secondary atrophic gastritis complicated by lower extremity neuropathy and bilateral DVTs presenting with sudden onset of chest tightness associated with shortness of breath and palpitations

## 2020-08-30 NOTE — PROVIDER CONTACT NOTE (OTHER) - REASON
Patient is receiving potassium phosphate but AM labs resulted and show potassium 5.9 (moderately hemolyzed and phosphorus 3.3

## 2020-08-30 NOTE — H&P ADULT - ATTENDING COMMENTS
#hypercalcemia  -just above ULN, check ionized calcium, PTH  -if persists, start on IVF    #elevated serum protein  -repeat CMP, if remains elevated, would check SPEP/UPEP/immunofixation #hypercalcemia  -just above ULN, check ionized calcium, PTH  -if persists, start on IVF    #elevated serum protein  -repeat CMP, if remains elevated, would check SPEP/UPEP/immunofixation    #macrocytic anemia  -chronic/stable, multifactorial - B12/hypothyroid/iron deficiency  -c/w folic acid, iron supplements and weekly B12 injections

## 2020-08-31 ENCOUNTER — TRANSCRIPTION ENCOUNTER (OUTPATIENT)
Age: 75
End: 2020-08-31

## 2020-08-31 LAB
ALBUMIN SERPL ELPH-MCNC: 4.1 G/DL — SIGNIFICANT CHANGE UP (ref 3.3–5)
ALP SERPL-CCNC: 73 U/L — SIGNIFICANT CHANGE UP (ref 40–120)
ALT FLD-CCNC: 8 U/L — SIGNIFICANT CHANGE UP (ref 4–33)
ANION GAP SERPL CALC-SCNC: 12 MMO/L — SIGNIFICANT CHANGE UP (ref 7–14)
AST SERPL-CCNC: 14 U/L — SIGNIFICANT CHANGE UP (ref 4–32)
BILIRUB SERPL-MCNC: 0.5 MG/DL — SIGNIFICANT CHANGE UP (ref 0.2–1.2)
BUN SERPL-MCNC: 14 MG/DL — SIGNIFICANT CHANGE UP (ref 7–23)
CALCIUM SERPL-MCNC: 10.4 MG/DL — SIGNIFICANT CHANGE UP (ref 8.4–10.5)
CHLORIDE SERPL-SCNC: 107 MMOL/L — SIGNIFICANT CHANGE UP (ref 98–107)
CO2 SERPL-SCNC: 23 MMOL/L — SIGNIFICANT CHANGE UP (ref 22–31)
CREAT SERPL-MCNC: 1.04 MG/DL — SIGNIFICANT CHANGE UP (ref 0.5–1.3)
GLUCOSE BLDC GLUCOMTR-MCNC: 119 MG/DL — HIGH (ref 70–99)
GLUCOSE BLDC GLUCOMTR-MCNC: 91 MG/DL — SIGNIFICANT CHANGE UP (ref 70–99)
GLUCOSE BLDC GLUCOMTR-MCNC: 92 MG/DL — SIGNIFICANT CHANGE UP (ref 70–99)
GLUCOSE SERPL-MCNC: 80 MG/DL — SIGNIFICANT CHANGE UP (ref 70–99)
HCT VFR BLD CALC: 34.5 % — SIGNIFICANT CHANGE UP (ref 34.5–45)
HGB BLD-MCNC: 10.4 G/DL — LOW (ref 11.5–15.5)
MCHC RBC-ENTMCNC: 30.1 % — LOW (ref 32–36)
MCHC RBC-ENTMCNC: 33.3 PG — SIGNIFICANT CHANGE UP (ref 27–34)
MCV RBC AUTO: 110.6 FL — HIGH (ref 80–100)
NRBC # FLD: 0 K/UL — SIGNIFICANT CHANGE UP (ref 0–0)
PLATELET # BLD AUTO: 380 K/UL — SIGNIFICANT CHANGE UP (ref 150–400)
PMV BLD: 11.9 FL — SIGNIFICANT CHANGE UP (ref 7–13)
POTASSIUM SERPL-MCNC: 4.4 MMOL/L — SIGNIFICANT CHANGE UP (ref 3.5–5.3)
POTASSIUM SERPL-SCNC: 4.4 MMOL/L — SIGNIFICANT CHANGE UP (ref 3.5–5.3)
PROT SERPL-MCNC: 7.8 G/DL — SIGNIFICANT CHANGE UP (ref 6–8.3)
RBC # BLD: 3.12 M/UL — LOW (ref 3.8–5.2)
RBC # FLD: 19.6 % — HIGH (ref 10.3–14.5)
SODIUM SERPL-SCNC: 142 MMOL/L — SIGNIFICANT CHANGE UP (ref 135–145)
WBC # BLD: 8.51 K/UL — SIGNIFICANT CHANGE UP (ref 3.8–10.5)
WBC # FLD AUTO: 8.51 K/UL — SIGNIFICANT CHANGE UP (ref 3.8–10.5)

## 2020-08-31 PROCEDURE — 99233 SBSQ HOSP IP/OBS HIGH 50: CPT

## 2020-08-31 RX ADMIN — Medication 650 MILLIGRAM(S): at 20:03

## 2020-08-31 RX ADMIN — APIXABAN 5 MILLIGRAM(S): 2.5 TABLET, FILM COATED ORAL at 05:17

## 2020-08-31 RX ADMIN — BUDESONIDE AND FORMOTEROL FUMARATE DIHYDRATE 2 PUFF(S): 160; 4.5 AEROSOL RESPIRATORY (INHALATION) at 21:27

## 2020-08-31 RX ADMIN — Medication 650 MILLIGRAM(S): at 19:37

## 2020-08-31 RX ADMIN — SIMVASTATIN 20 MILLIGRAM(S): 20 TABLET, FILM COATED ORAL at 21:27

## 2020-08-31 RX ADMIN — NYSTATIN CREAM 1 APPLICATION(S): 100000 CREAM TOPICAL at 17:20

## 2020-08-31 RX ADMIN — BUDESONIDE AND FORMOTEROL FUMARATE DIHYDRATE 2 PUFF(S): 160; 4.5 AEROSOL RESPIRATORY (INHALATION) at 09:58

## 2020-08-31 RX ADMIN — Medication 150 MICROGRAM(S): at 05:16

## 2020-08-31 RX ADMIN — Medication 1 MILLIGRAM(S): at 17:19

## 2020-08-31 RX ADMIN — Medication 30 MILLIGRAM(S): at 05:16

## 2020-08-31 RX ADMIN — NYSTATIN CREAM 1 APPLICATION(S): 100000 CREAM TOPICAL at 05:16

## 2020-08-31 RX ADMIN — APIXABAN 5 MILLIGRAM(S): 2.5 TABLET, FILM COATED ORAL at 17:19

## 2020-08-31 RX ADMIN — Medication 325 MILLIGRAM(S): at 17:19

## 2020-08-31 NOTE — DISCHARGE NOTE PROVIDER - HOSPITAL COURSE
75-year-old female with NIDDM2, HTN, and recent admission for B12 deficiency 2/2 atrophic gastritis complicated by lower extremity neuropathy and bilateral DVTs presenting with sudden onset of chest tightness associated with shortness of breath and palpitations, low concern for ACS, possibly acute anxiety.         Chest pain.      Plan: resolved, possibly related to intertrigo as appears likely more superficial pain    new EKG TWI, trop (-)x2, monitor on tele    fall on Sunday associated with dizziness, check TTE.      Neuropathy.          Plan: -On weekly B12 injections given atrophic gastritis with positive parietal cell antibodies and an indeterminate intrinsic factor on Aug 5th    - received dose every saturday    -B12 >2000 per AM lab on 8/21    -persistence of symptoms that patient reports have only been present after a fall a few weeks ago, seen by neuro 8/09/20 on prior admission, symptoms thought to be secondary to B12 neuropathy, CT L spine from prior admission reviewed; patient very concerned about her symptoms, consider outpatient neuro f/u for reassurance.          Intertrigo.      Plan: nystatin topical.          Essential hypertension.      Plan: c/w home medications with hold parameters.          Hypothyroid.      Plan: c/w levothyroxine     TSH 13.88 uIU/mL (08.20.20 @ 10:53)    dose increased on last admission from 125 to 150mcg, repeat TSH as outpatient in 4-6wks to assess for response.         :DVT (deep venous thrombosis).     Plan: c/w apixaban.         Diabetes mellitus type 2, noninsulin dependent.      Plan: hold metformin while inpatient    cc diet while inpatient     A1c 5.4 % (08.04.20 @ 22:00).          Asthma.      Plan: c/w ICS/LABA and KEVIN PRNs.          Sacral wound.      Plan: wound care consult.         FALL    -. Plan; Xray L. hip, pelvis, femur no evidence of acute fracture    fall precautions, PT rec home w/ home PT    checking orthostatics, TTE.    INCOMPLETE 75-year-old female with NIDDM2, HTN, and recent admission for B12 deficiency 2/2 atrophic gastritis complicated by lower extremity neuropathy and bilateral DVTs presenting with sudden onset of chest tightness associated with shortness of breath and palpitations, low concern for ACS, possibly acute anxiety.        Atypical chest pain.    - Possibly related to intertrigo as appears likely more superficial pain    - New EKG TWI, trop (-)x2    - Cardiology consulted --> low suspicion for ACS    - TTE/NST grossly unremarkable    - Resolved        Neuropathy    - On weekly B12 injections given atrophic gastritis w/ positive parietal cell antibodies and an indeterminate intrinsic factor on Aug 5th    - Vitamin B12 >2000 per AM lab on 8/21    - Report of recent fall a few weeks ago, seen by neuro 8/09/20 on prior admission, symptoms thought to be secondary to B12 neuropathy    - CT L spine from prior admission reviewed    - LE xrays without fractures    - Outpatient neurology follow up         Intertrigo.     - c/w nystatin topical         Essential hypertension.      - c/w home medications with hold parameters.         Hypothyroid.      - c/w levothyroxine     - TSH 13.88 uIU/mL (08.20.20 @ 10:53)    - Repeat TSH as outpatient in 4-6 wks to assess for response        DVT (deep venous thrombosis).     - c/w apixaban.        Diabetes mellitus type 2, noninsulin dependent.     - A1c 5.4            Asthma.     - c/w ICS/LABA and KEVIN PRNs.         Sacral wound.      - c/w local wound care per wound care RN        FALL    - Xrays without fracture    - PT rec home w/ home PT    - As above for neuropathy        On 9/1 this case was reviewed with Dr. Alvarez, the patient is medically stable and optimized for discharge. All medications were reviewed and prescriptions were sent to mutually agreed upon pharmacy. 75-year-old female with NIDDM2, HTN, and recent admission for B12 deficiency 2/2 atrophic gastritis complicated by lower extremity neuropathy and bilateral DVTs presenting with sudden onset of chest tightness associated with shortness of breath and palpitations, low concern for ACS, possibly acute anxiety.        Atypical chest pain.    - Possibly related to intertrigo as appears likely more superficial pain    - New EKG TWI, trop (-)x2    - Cardiology consulted --> low suspicion for ACS    - TTE/NST grossly unremarkable    - Resolved        Neuropathy    - On weekly B12 injections given atrophic gastritis w/ positive parietal cell antibodies and an indeterminate intrinsic factor on Aug 5th    - Vitamin B12 >2000 per AM lab on 8/21    - Report of recent fall a few weeks ago, seen by neuro 8/09/20 on prior admission, symptoms thought to be secondary to B12 neuropathy    - CT L spine from prior admission reviewed    - LE xrays without fractures    - Outpatient neurology follow up         Intertrigo.     - c/w nystatin topical         Essential hypertension.      - c/w home medications with hold parameters.         Hypothyroid.      - c/w levothyroxine     - TSH 13.88 uIU/mL (08.20.20 @ 10:53)    - Repeat TSH as outpatient in 4-6 wks to assess for response        DVT (deep venous thrombosis).     - c/w apixaban.        Diabetes mellitus type 2, noninsulin dependent.     - A1c 5.4            Asthma.     - c/w ICS/LABA and KEVIN PRNs.         Sacral wound.      - c/w local wound care per wound care RN        FALL    - Xrays without fracture    - PT rec home w/ home PT    - As above for neuropathy        On 9/2 this case was reviewed with Dr. Alvarez, the patient is medically stable and optimized for discharge. All medications were reviewed and prescriptions were sent to mutually agreed upon pharmacy.

## 2020-08-31 NOTE — PROGRESS NOTE ADULT - PROBLEM SELECTOR PLAN 1
resolved, possibly related to intertrigo as appears likely more superficial pain  EKG with TWI in v2, CW neg, cards eval   TTE pending  suspect orthostatics was documented incorrectly with standing pressure higher - will repeat today resolved, possibly related to intertrigo as appears likely more superficial pain  EKG with TWI in v2, CE neg, cards eval   TTE pending  suspect orthostatics was documented incorrectly with standing pressure higher - will repeat today

## 2020-08-31 NOTE — DISCHARGE NOTE PROVIDER - CARE PROVIDER_API CALL
Marion Matthews  INTERNAL MEDICINE  [address] [place] [address]  Phone: [phone]  Fax: [phone]  Established Patient  Follow Up Time: 1 month    Igor West  CARDIOVASCULAR DISEASE  [address] [place] [id number]  Phone: [phone]  Fax: [phone]  Follow Up Time: 2 weeks Marion Matthews  INTERNAL MEDICINE  [address] [place] [address]  Phone: [phone]  Fax: [phone]  Established Patient  Follow Up Time: 1 month    Igor West  CARDIOVASCULAR DISEASE  [address] [address]  Phone: [phone]  Fax: [phone]  Follow Up Time: 2 weeks    Ary Monroy  SURGERY  [address] [address]  Phone: [phone]  Fax: [phone]  Follow Up Time: 1 month

## 2020-08-31 NOTE — ADVANCED PRACTICE NURSE CONSULT - REASON FOR CONSULT
Patient seen on skin care rounds after wound care referral received for assessment of skin impairment and recommendations of topical management. Chart reviewed: Serum WBC 8.51, Robin 19, BMI 23.4kg/m2. Patient H/O NIDDM2, HTN, and recent admission for B12 deficiency 2/2 atrophic gastritis complicated by lower extremity neuropathy and bilateral DVTs presenting with sudden onset of chest tightness associated with shortness of breath and palpitations, lasting approximately 2 hours, somewhat relieved with acetaminophen. Pain is non-radiating, inframammary in origin, worse with palpation. Patient reports a remote history of MI, states pain is dissimilar. Pain was rated 10/10 and started after taking a new prescription given to her by her PMD. Patient notes recent rash that also started yesterday under both breasts and in waistline that is both painful and pruritic.  She also notes sores on her backside after her fall. Patient reports a recent fall on Sunday (day after recent hospital discharge) in her kitchen, landing on her buttocks after an episode of dizziness. She denies any head trauma or syncope at that time. Admitted with abdominal pain.

## 2020-08-31 NOTE — ADVANCED PRACTICE NURSE CONSULT - RECOMMEDATIONS
B/L breast: Cleanse with skin cleanser, pat dry. Apply Nystatin powder twice a day and PRN.    Sacral fold: Cleanse with skin cleanser, pat dry. Apply TRIAD paste twice a day and PRN.    Continue low air loss bed therapy, heel elevation, continue to turn & reposition q2h, continue moisture management with barrier creams & single breathable pad, continue measures to decrease friction/shear/pressure.    Please call wound care service line is further assistance is needed ([phone]).

## 2020-08-31 NOTE — DISCHARGE NOTE PROVIDER - PROVIDER TOKENS
PROVIDER:[TOKEN:[[id number]],FOLLOWUP:[1 month],ESTABLISHEDPATIENT:[T]],PROVIDER:[TOKEN:[[id number]],FOLLOWUP:[2 weeks]] PROVIDER:[TOKEN:[[id number]],FOLLOWUP:[1 month],ESTABLISHEDPATIENT:[T]],PROVIDER:[TOKEN:[[id number]],FOLLOWUP:[2 weeks]],PROVIDER:[TOKEN:[[id number]],FOLLOWUP:[1 month]]

## 2020-08-31 NOTE — DISCHARGE NOTE PROVIDER - NSDCHHNEEDSERVICE_GEN_ALL_CORE
Medication teaching and assessment/Teaching and training/Rehabilitation services/Observation and assessment

## 2020-08-31 NOTE — PROGRESS NOTE ADULT - PROBLEM SELECTOR PLAN 10
Xray L. hip, pelvis, femur no evidence of acute fracture  fall precautions, PT rec home w/ home PT  checking orthostatics, TTE, cards prior to dispo

## 2020-08-31 NOTE — ADVANCED PRACTICE NURSE CONSULT - ASSESSMENT
A&Ox3, OOB ad otto, continent of urine and stool. Skin warm, dry with increased moisture in sacral fold, adequate skin turgor, scattered areas of eccyhmosis on bilateral upper extremities. (+) vitiligo of hands and face.    Bilateral breasts with moisture associated dermatitis: hyperpigmentation with purple hue (suspected candidiasis), skin intact. Patient denies pruritis at this time but reported at pruritis at home.    Sacral fold, injury secondary to trauma of fall at home and complicated by moisture created in sacral fold- patient reports ambulation at home and in hospital, does not appear to be a source of pressure. Patient also reports that this area was itchy and she kept irritating the injury: measures 1cmx0.3cmx0.2cm. Wound base tissue type 100% pink, moist dermis. Periwound skin intact with blanchable erythema in sacral fold secondary to moisture. When patient is in natural anatomical position this wound is not visible. Scant serous drainage, no odor. No induration, no increased warmth (no signs of soft tissue infection). Goal of care: Protect from moisture, hydrophilic healing.

## 2020-08-31 NOTE — DISCHARGE NOTE PROVIDER - NSDCCPCAREPLAN_GEN_ALL_CORE_FT
PRINCIPAL DISCHARGE DIAGNOSIS  Diagnosis: Atypical chest pain  Assessment and Plan of Treatment: You were seen by cardiology and underwent cardiac testing which was unrevealing and there is low suspicion for acute cardiac disease at this time. Your symptoms have since resolved. Contiue your medications as directed and follow up with your primary care provider and cardiologist within 2 - 3 weeks for further assessment and management recommendations.      SECONDARY DISCHARGE DIAGNOSES  Diagnosis: Sacral wound  Assessment and Plan of Treatment: Continue local wound care.  B/L breast: Cleanse with skin cleanser, pat dry. Apply Nystatin powder twice a day and as needed.  Sacral fold: Cleanse with skin cleanser, pat dry. Apply TRIAD paste twice a day and as needed.    Diagnosis: DVT (deep venous thrombosis)  Assessment and Plan of Treatment: Please continue Eliquis as directed and follow up with your primary care provider within 2 - 3 weeks of discharge for further care and recommendations. Report to the ED should you feel any shortness of breath, difficulty breathing with exersion, HA, dizziness, and continued or worsening chest pain.    Diagnosis: Neuropathy  Assessment and Plan of Treatment: Continue your vitamins and medications as directed.    Diagnosis: Hypothyroid  Assessment and Plan of Treatment: Continue synthroid. Follow up with your primary care provider in 4 - 6 weeks for repeat thyroid studies.    Diagnosis: Diabetes mellitus type 2, noninsulin dependent  Assessment and Plan of Treatment: Hgb a1c 5.4%  Contiue your metformin.    Diagnosis: Essential hypertension  Assessment and Plan of Treatment: Continue blood pressure medication regimen as directed. Monitor for any visual changes, headaches or dizziness.  Monitor blood pressure regularly.  Follow up with your PCP for further management for high blood pressure.    Diagnosis: Intertrigo  Assessment and Plan of Treatment: Continue topical nystatin as ordered.    Diagnosis: Fall  Assessment and Plan of Treatment: Likely secondary to your neuropathy. Continue your Vitamin B12 supplementation. PRINCIPAL DISCHARGE DIAGNOSIS  Diagnosis: Atypical chest pain  Assessment and Plan of Treatment: You were seen by cardiology and underwent cardiac testing which was unrevealing and there is low suspicion for acute cardiac disease at this time. Your symptoms have since resolved. Contiue your medications as directed and follow up with your primary care provider and cardiologist within 2 - 3 weeks for further assessment and management recommendations.      SECONDARY DISCHARGE DIAGNOSES  Diagnosis: Fall  Assessment and Plan of Treatment: Likely secondary to your neuropathy.  Xray of hip, pelvis, femus negative for fracture. Continue your Vitamin B12 supplementation. Podiatry workup negative.    Diagnosis: Intertrigo  Assessment and Plan of Treatment: Continue topical nystatin as ordered.    Diagnosis: Essential hypertension  Assessment and Plan of Treatment: Continue blood pressure medication regimen as directed. Monitor for any visual changes, headaches or dizziness.  Monitor blood pressure regularly.  Follow up with your PCP for further management for high blood pressure.    Diagnosis: Diabetes mellitus type 2, noninsulin dependent  Assessment and Plan of Treatment: Hgb a1c 5.4%  Contiue your metformin. Follow up closely with your PCP.    Diagnosis: Hypothyroid  Assessment and Plan of Treatment: Continue synthroid. Follow up with your primary care provider in 4 - 6 weeks for repeat thyroid studies.    Diagnosis: Neuropathy  Assessment and Plan of Treatment: Podiatry workup negative. Continue your vitamins and medications as directed.    Diagnosis: DVT (deep venous thrombosis)  Assessment and Plan of Treatment: Please continue Eliquis as directed and follow up with your primary care provider within 2 - 3 weeks of discharge for further care and recommendations. Report to the ED should you feel any shortness of breath, difficulty breathing with exersion, HA, dizziness, and continued or worsening chest pain.    Diagnosis: Sacral wound  Assessment and Plan of Treatment: Continue local wound care.  B/L breast: Cleanse with skin cleanser, pat dry. Apply Nystatin powder twice a day and as needed.  Sacral fold: Cleanse with skin cleanser, pat dry. Apply TRIAD paste twice a day and as needed. PRINCIPAL DISCHARGE DIAGNOSIS  Diagnosis: Atypical chest pain  Assessment and Plan of Treatment: You were seen by cardiology and underwent cardiac testing which was unrevealing and there is low suspicion for acute cardiac disease at this time. Your symptoms have since resolved. Contiue your medications as directed and follow up with your primary care provider and cardiologist within 2 - 3 weeks for further assessment and management recommendations.      SECONDARY DISCHARGE DIAGNOSES  Diagnosis: Fall  Assessment and Plan of Treatment: Likely secondary to your neuropathy.  Xray of hip, pelvis, femus negative for fracture. Continue your Vitamin B12 supplementation. Podiatry workup negative. Please follow up with a neurologist outpatient if further concert surface.    Diagnosis: Intertrigo  Assessment and Plan of Treatment: Continue topical nystatin as ordered.    Diagnosis: Essential hypertension  Assessment and Plan of Treatment: Continue blood pressure medication regimen as directed. Monitor for any visual changes, headaches or dizziness.  Monitor blood pressure regularly.  Follow up with your PCP for further management for high blood pressure.    Diagnosis: Diabetes mellitus type 2, noninsulin dependent  Assessment and Plan of Treatment: Hgb a1c 5.4%  Contiue your metformin. Follow up closely with your PCP.    Diagnosis: Hypothyroid  Assessment and Plan of Treatment: Continue synthroid. Follow up with your primary care provider in 4 - 6 weeks for repeat thyroid studies.    Diagnosis: Neuropathy  Assessment and Plan of Treatment: Podiatry workup negative. Continue your vitamins and medications as directed.    Diagnosis: DVT (deep venous thrombosis)  Assessment and Plan of Treatment: Please continue Eliquis as directed and follow up with your primary care provider within 2 - 3 weeks of discharge for further care and recommendations. Report to the ED should you feel any shortness of breath, difficulty breathing with exersion, HA, dizziness, and continued or worsening chest pain.    Diagnosis: Sacral wound  Assessment and Plan of Treatment: Continue local wound care.  B/L breast: Cleanse with skin cleanser, pat dry. Apply Nystatin powder twice a day and as needed.  Sacral fold: Cleanse with skin cleanser, pat dry. Apply TRIAD paste twice a day and as needed.

## 2020-08-31 NOTE — DISCHARGE NOTE PROVIDER - NSDCMRMEDTOKEN_GEN_ALL_CORE_FT
Advair Diskus 500 mcg-50 mcg inhalation powder: 1 puff(s) inhaled 2 times a day  albuterol 90 mcg/inh inhalation aerosol: 2 puff(s) inhaled every 6 hours, As needed, SOB  apixaban 5 mg oral tablet: 1 tab(s) orally every 12 hours  cyanocobalamin 1000 mcg/mL injectable solution: 1 milliliter(s) injectable once a week   ferrous sulfate 325 mg (65 mg elemental iron) oral tablet: 1 tab(s) orally once a day  folic acid 1 mg oral tablet: 1 tab(s) orally once a day  levothyroxine 150 mcg (0.15 mg) oral tablet: 1 tab(s) orally once a day  metFORMIN 500 mg oral tablet: 1 tab(s) orally once a day  NIFEdipine 30 mg oral tablet, extended release: 1 tab(s) orally once a day  simvastatin 20 mg oral tablet: 1 tab(s) orally once a day (at bedtime) Advair Diskus 500 mcg-50 mcg inhalation powder: 1 puff(s) inhaled 2 times a day  albuterol 90 mcg/inh inhalation aerosol: 2 puff(s) inhaled every 6 hours, As needed, SOB  apixaban 5 mg oral tablet: 1 tab(s) orally every 12 hours  cyanocobalamin 1000 mcg/mL injectable solution: 1 milliliter(s) injectable once a week   ferrous sulfate 325 mg (65 mg elemental iron) oral tablet: 1 tab(s) orally once a day  folic acid 1 mg oral tablet: 1 tab(s) orally once a day  levothyroxine 150 mcg (0.15 mg) oral tablet: 1 tab(s) orally once a day  metFORMIN 500 mg oral tablet: 1 tab(s) orally once a day  NIFEdipine 30 mg oral tablet, extended release: 1 tab(s) orally once a day  nystatin 100,000 units/g topical powder: 1 application topically 2 times a day as needed for fungal infection   polyethylene glycol 3350 oral powder for reconstitution: 17 gram(s) orally once a day, As needed, Constipation  simvastatin 20 mg oral tablet: 1 tab(s) orally once a day (at bedtime)

## 2020-09-01 LAB
ANION GAP SERPL CALC-SCNC: 10 MMO/L — SIGNIFICANT CHANGE UP (ref 7–14)
BUN SERPL-MCNC: 17 MG/DL — SIGNIFICANT CHANGE UP (ref 7–23)
CALCIUM SERPL-MCNC: 10.2 MG/DL — SIGNIFICANT CHANGE UP (ref 8.4–10.5)
CHLORIDE SERPL-SCNC: 104 MMOL/L — SIGNIFICANT CHANGE UP (ref 98–107)
CO2 SERPL-SCNC: 23 MMOL/L — SIGNIFICANT CHANGE UP (ref 22–31)
CREAT SERPL-MCNC: 0.94 MG/DL — SIGNIFICANT CHANGE UP (ref 0.5–1.3)
GLUCOSE BLDC GLUCOMTR-MCNC: 82 MG/DL — SIGNIFICANT CHANGE UP (ref 70–99)
GLUCOSE BLDC GLUCOMTR-MCNC: 83 MG/DL — SIGNIFICANT CHANGE UP (ref 70–99)
GLUCOSE BLDC GLUCOMTR-MCNC: 84 MG/DL — SIGNIFICANT CHANGE UP (ref 70–99)
GLUCOSE BLDC GLUCOMTR-MCNC: 91 MG/DL — SIGNIFICANT CHANGE UP (ref 70–99)
GLUCOSE SERPL-MCNC: 89 MG/DL — SIGNIFICANT CHANGE UP (ref 70–99)
HCT VFR BLD CALC: 30.2 % — LOW (ref 34.5–45)
HGB BLD-MCNC: 9.5 G/DL — LOW (ref 11.5–15.5)
MAGNESIUM SERPL-MCNC: 2.3 MG/DL — SIGNIFICANT CHANGE UP (ref 1.6–2.6)
MCHC RBC-ENTMCNC: 31.5 % — LOW (ref 32–36)
MCHC RBC-ENTMCNC: 33.1 PG — SIGNIFICANT CHANGE UP (ref 27–34)
MCV RBC AUTO: 105.2 FL — HIGH (ref 80–100)
NRBC # FLD: 0 K/UL — SIGNIFICANT CHANGE UP (ref 0–0)
PHOSPHATE SERPL-MCNC: 3.2 MG/DL — SIGNIFICANT CHANGE UP (ref 2.5–4.5)
PLATELET # BLD AUTO: 364 K/UL — SIGNIFICANT CHANGE UP (ref 150–400)
PMV BLD: 11.1 FL — SIGNIFICANT CHANGE UP (ref 7–13)
POTASSIUM SERPL-MCNC: 5.4 MMOL/L — HIGH (ref 3.5–5.3)
POTASSIUM SERPL-SCNC: 5.4 MMOL/L — HIGH (ref 3.5–5.3)
RBC # BLD: 2.87 M/UL — LOW (ref 3.8–5.2)
RBC # FLD: 19.5 % — HIGH (ref 10.3–14.5)
SODIUM SERPL-SCNC: 137 MMOL/L — SIGNIFICANT CHANGE UP (ref 135–145)
WBC # BLD: 7.27 K/UL — SIGNIFICANT CHANGE UP (ref 3.8–10.5)
WBC # FLD AUTO: 7.27 K/UL — SIGNIFICANT CHANGE UP (ref 3.8–10.5)

## 2020-09-01 PROCEDURE — 93018 CV STRESS TEST I&R ONLY: CPT | Mod: GC

## 2020-09-01 PROCEDURE — 93306 TTE W/DOPPLER COMPLETE: CPT | Mod: 26

## 2020-09-01 PROCEDURE — 93016 CV STRESS TEST SUPVJ ONLY: CPT | Mod: GC

## 2020-09-01 PROCEDURE — 99233 SBSQ HOSP IP/OBS HIGH 50: CPT

## 2020-09-01 PROCEDURE — 78452 HT MUSCLE IMAGE SPECT MULT: CPT | Mod: 26

## 2020-09-01 RX ORDER — SODIUM ZIRCONIUM CYCLOSILICATE 10 G/10G
5 POWDER, FOR SUSPENSION ORAL ONCE
Refills: 0 | Status: DISCONTINUED | OUTPATIENT
Start: 2020-09-01 | End: 2020-09-01

## 2020-09-01 RX ADMIN — APIXABAN 5 MILLIGRAM(S): 2.5 TABLET, FILM COATED ORAL at 17:41

## 2020-09-01 RX ADMIN — NYSTATIN CREAM 1 APPLICATION(S): 100000 CREAM TOPICAL at 05:55

## 2020-09-01 RX ADMIN — SIMVASTATIN 20 MILLIGRAM(S): 20 TABLET, FILM COATED ORAL at 21:21

## 2020-09-01 RX ADMIN — Medication 30 MILLIGRAM(S): at 05:22

## 2020-09-01 RX ADMIN — Medication 650 MILLIGRAM(S): at 05:22

## 2020-09-01 RX ADMIN — BUDESONIDE AND FORMOTEROL FUMARATE DIHYDRATE 2 PUFF(S): 160; 4.5 AEROSOL RESPIRATORY (INHALATION) at 21:21

## 2020-09-01 RX ADMIN — Medication 150 MICROGRAM(S): at 05:22

## 2020-09-01 RX ADMIN — NYSTATIN CREAM 1 APPLICATION(S): 100000 CREAM TOPICAL at 17:40

## 2020-09-01 RX ADMIN — Medication 325 MILLIGRAM(S): at 17:41

## 2020-09-01 RX ADMIN — Medication 650 MILLIGRAM(S): at 05:52

## 2020-09-01 RX ADMIN — Medication 1 MILLIGRAM(S): at 17:41

## 2020-09-01 RX ADMIN — APIXABAN 5 MILLIGRAM(S): 2.5 TABLET, FILM COATED ORAL at 05:22

## 2020-09-01 NOTE — CONSULT NOTE ADULT - SUBJECTIVE AND OBJECTIVE BOX
HPI:  75-year-old female with NIDDM2, HTN, and recent admission for B12 deficiency 2/2 atrophic gastritis complicated by lower extremity neuropathy and bilateral DVTs presenting with sudden onset of chest tightness associated with shortness of breath and palpitations, lasting approximately 2 hours, somewhat relieved with acetaminophen. Pain is non-radiating, inframammary in origin, worse with palpation. Patient reports a remote history of MI, states pain is dissimilar. Pain was rated 10/10 and started after taking a new prescription given to her by her PMD. Patient notes recent rash that also started yesterday under both breasts and in waistline that is both painful and pruritic.  She also notes sores on her backside after her fall.	    Patient reports a recent fall on Sunday (day after recent hospital discharge) in her kitchen, landing on her buttocks after an episode of dizziness. She denies any head trauma or syncope at that time.    In the ED VS: 98.5  60-77  117-135/74-92  18-24  %RA, received albuterol inhaler, acetaminophen 975mg PO x1, clindamycin 900mg IV x1 (30 Aug 2020 04:03)    Patient is a 75y old  Female who presents with a chief complaint of difficulty ambulating, chest discomfort, shortness of breath (01 Sep 2020 11:05)    Allergies    Apple Juice (Rash)  Carrots (Rash)  No Known Drug Allergies  pork (Rash)    Intolerances      Vital Signs Last 24 Hrs  T(C): 37 (01 Sep 2020 14:00), Max: 37 (01 Sep 2020 14:00)  T(F): 98.6 (01 Sep 2020 14:00), Max: 98.6 (01 Sep 2020 14:00)  HR: 64 (01 Sep 2020 14:00) (57 - 69)  BP: 123/61 (01 Sep 2020 14:00) (119/60 - 129/71)  BP(mean): --  RR: 17 (01 Sep 2020 14:00) (17 - 18)  SpO2: 100% (01 Sep 2020 14:00) (100% - 100%)                        9.5    7.27  )-----------( 364      ( 01 Sep 2020 05:47 )             30.2     09-01    137  |  104  |  17  ----------------------------<  89  5.4<H>   |  23  |  0.94    Ca    10.2      01 Sep 2020 05:47  Phos  3.2     09-01  Mg     2.3     09-01    TPro  7.8  /  Alb  4.1  /  TBili  0.5  /  DBili  x   /  AST  14  /  ALT  8   /  AlkPhos  73  08-31    CAPILLARY BLOOD GLUCOSE      POCT Blood Glucose.: 84 mg/dL (01 Sep 2020 12:35)    MEDICATIONS  (STANDING):  apixaban 5 milliGRAM(s) Oral every 12 hours  budesonide 160 MICROgram(s)/formoterol 4.5 MICROgram(s) Inhaler 2 Puff(s) Inhalation two times a day  cyanocobalamin Injectable 1000 MICROGram(s) IntraMuscular daily  ferrous    sulfate 325 milliGRAM(s) Oral daily  folic acid 1 milliGRAM(s) Oral daily  levothyroxine 150 MICROGram(s) Oral daily  NIFEdipine XL 30 milliGRAM(s) Oral daily  nystatin Powder 1 Application(s) Topical two times a day  simvastatin 20 milliGRAM(s) Oral at bedtime    MEDICATIONS  (PRN):  acetaminophen   Tablet .. 650 milliGRAM(s) Oral every 6 hours PRN Mild Pain (1 - 3), Moderate Pain (4 - 6)  ALBUTerol    90 MICROgram(s) HFA Inhaler 2 Puff(s) Inhalation every 6 hours PRN SOB  bisacodyl 5 milliGRAM(s) Oral daily PRN Constipation  polyethylene glycol 3350 17 Gram(s) Oral daily PRN Constipation  senna 2 Tablet(s) Oral at bedtime PRN Constipation    PAST MEDICAL & SURGICAL HISTORY:  Myocardial infarction: approximately 49y/o  Psoriasis  Vitiligo  DVT (deep venous thrombosis), right: Bilateral LE DVT  Hypothyroid  Diabetes: Type 2 diabetes  Essential hypertension  S/P hernia surgery    LUCIANO:  Ingrowing toe nails b/l hallux with pain - no open lesions or signs of infection   neuropathy b/l feet  pedal pusles palp 2/4 b/l   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
CHIEF COMPLAINT:Patient is a 75y old  Female who presents with a chief complaint of difficulty ambulating, chest discomfort, shortness of breath (31 Aug 2020 11:49)      HISTORY OF PRESENT ILLNESS:    75 female with history as below NIDDM2, HTN, and recent admission for B12 deficiency 2/2 atrophic gastritis complicated by lower extremity neuropathy and bilateral DVTs presenting with sudden onset of chest tightness associated with shortness of breath and palpitations  now feels better  no syncope  no dizziness     PAST MEDICAL & SURGICAL HISTORY:  Myocardial infarction: approximately 51y/o  Psoriasis  Vitiligo  DVT (deep venous thrombosis), right: Bilateral LE DVT  Hypothyroid  Diabetes: Type 2 diabetes  Essential hypertension  S/P hernia surgery          MEDICATIONS:  apixaban 5 milliGRAM(s) Oral every 12 hours  NIFEdipine XL 30 milliGRAM(s) Oral daily      ALBUTerol    90 MICROgram(s) HFA Inhaler 2 Puff(s) Inhalation every 6 hours PRN  budesonide 160 MICROgram(s)/formoterol 4.5 MICROgram(s) Inhaler 2 Puff(s) Inhalation two times a day    acetaminophen   Tablet .. 650 milliGRAM(s) Oral every 6 hours PRN    bisacodyl 5 milliGRAM(s) Oral daily PRN  polyethylene glycol 3350 17 Gram(s) Oral daily PRN  senna 2 Tablet(s) Oral at bedtime PRN    levothyroxine 150 MICROGram(s) Oral daily  simvastatin 20 milliGRAM(s) Oral at bedtime    ferrous    sulfate 325 milliGRAM(s) Oral daily  folic acid 1 milliGRAM(s) Oral daily  nystatin Powder 1 Application(s) Topical two times a day      FAMILY HISTORY:  FH: diabetes mellitus      Non-contributory    SOCIAL HISTORY:    No tobacco, drugs or etoh    Allergies    Apple Juice (Rash)  Carrots (Rash)  No Known Drug Allergies  pork (Rash)    Intolerances    	    REVIEW OF SYSTEMS:  as above  The rest of the 14 points ROS reviewed and except above they are unremarkable.        PHYSICAL EXAM:  T(C): 36.7 (08-31-20 @ 17:44), Max: 37.1 (08-30-20 @ 21:29)  HR: 69 (08-31-20 @ 17:44) (58 - 79)  BP: 119/60 (08-31-20 @ 17:44) (119/60 - 130/75)  RR: 18 (08-31-20 @ 17:44) (16 - 18)  SpO2: 100% (08-31-20 @ 17:44) (100% - 100%)  Wt(kg): --  I&O's Summary    31 Aug 2020 07:01  -  31 Aug 2020 19:03  --------------------------------------------------------  IN: 400 mL / OUT: 0 mL / NET: 400 mL        JVP: Normal  Neck: supple  Lung: clear   CV: S1 S2 , Murmur: 3/6 carlos   Abd: soft  Ext: No edema  neuro: Awake / alert  Psych: flat affect  Skin: normal      LABS/DATA:    TELEMETRY: 	    ECG:  	sinus, t wave abn septal leads    	  CARDIAC MARKERS:                                      10.4   8.51  )-----------( 380      ( 31 Aug 2020 06:17 )             34.5     08-31    142  |  107  |  14  ----------------------------<  80  4.4   |  23  |  1.04    Ca    10.4      31 Aug 2020 06:17  Phos  3.0     08-30  Mg     2.3     08-30    TPro  7.8  /  Alb  4.1  /  TBili  0.5  /  DBili  x   /  AST  14  /  ALT  8   /  AlkPhos  73  08-31    proBNP:   Lipid Profile:   HgA1c:   TSH:

## 2020-09-01 NOTE — PROVIDER CONTACT NOTE (OTHER) - ASSESSMENT
patient alert and oriented x 3-4, aside from HR vital signs stable, HR with morning vitals 57, /61, temp 97.9F oral, RR 18, O2 100% room air, patient remain asymptomatic

## 2020-09-01 NOTE — CONSULT NOTE ADULT - REASON FOR ADMISSION
difficulty ambulating, chest discomfort, shortness of breath
difficulty ambulating, chest discomfort, shortness of breath

## 2020-09-01 NOTE — CONSULT NOTE ADULT - ASSESSMENT
75 year old DMII female with difficulty walking 2/2 b/l LE neuropathy, ingrowing toe nails b/l hallux  -patients daughter asked for me to evaluate patient for any etiology from the foot - explained to her neuropathy systemic and not stemming from any foot issues. Patient and family Known to me out patient  -Will encourage Fallon, daughter to reach out to primary regarding rehab vs home placement  -rec aggressive PT while in house  -Debridement of hallux nails to relieve some pain from the great toe b/l   -no open lesions or local signs of infection   -no other acute podiatric issues at this time    Follow up with Dr. Kayode SHAH within 1 month of discharge.  Call for appointment   [place] office: [phone]
Chest pain   no evidence of acute MI    agree with Echo  will get stress test     HTN  stable  cont nifedipine     HLD  on statin     DVT   on a/c

## 2020-09-01 NOTE — PROGRESS NOTE ADULT - PROBLEM SELECTOR PLAN 10
Xray L. hip, pelvis, femur no evidence of acute fracture  fall precautions, PT rec home w/ home PT  DC planning if TTE and NST ok - Time spent 35 min

## 2020-09-01 NOTE — PROGRESS NOTE ADULT - ATTENDING COMMENTS
addendum - TTE and NST ok  dw with SW and LEONARD in detail - I also reviewed records from prior admissions in detail and d PT - Pt with multiple readmissions for fall due to b12 induced neuropathy and ataxia - she has refused rehab in the past and returns with falls at home. safety at home is a concern considering this and PT recs for home PT were in light of pt's refusal for rehab in the past. I have called daughter Fallon multiple times, no ans, left LIV HUSAIN also calling. need to clarify living situation a home to assure safe dispo. addendum - TTE and NST ok  dw with SW and LEONARD in detail - I also reviewed records from prior admissions in detail  - Pt with multiple readmissions for fall due to b12 induced neuropathy and ataxia - she has refused rehab in the past and returns with falls at home. There was also discussions about LTC with daughter last admissions due to safety concerns at home and PT recs for home PT were in light of pt's refusal for rehab in the past. I have called daughter Fallon multiple times, no ans, left LIV HUSAIN also calling. need to clarify living situation at home to assure safe dispo.

## 2020-09-02 ENCOUNTER — TRANSCRIPTION ENCOUNTER (OUTPATIENT)
Age: 75
End: 2020-09-02

## 2020-09-02 VITALS
SYSTOLIC BLOOD PRESSURE: 120 MMHG | RESPIRATION RATE: 18 BRPM | HEART RATE: 70 BPM | OXYGEN SATURATION: 100 % | DIASTOLIC BLOOD PRESSURE: 65 MMHG | TEMPERATURE: 98 F

## 2020-09-02 LAB
ANION GAP SERPL CALC-SCNC: 12 MMO/L — SIGNIFICANT CHANGE UP (ref 7–14)
BUN SERPL-MCNC: 18 MG/DL — SIGNIFICANT CHANGE UP (ref 7–23)
CALCIUM SERPL-MCNC: 10.6 MG/DL — HIGH (ref 8.4–10.5)
CHLORIDE SERPL-SCNC: 103 MMOL/L — SIGNIFICANT CHANGE UP (ref 98–107)
CO2 SERPL-SCNC: 24 MMOL/L — SIGNIFICANT CHANGE UP (ref 22–31)
CREAT SERPL-MCNC: 0.94 MG/DL — SIGNIFICANT CHANGE UP (ref 0.5–1.3)
GLUCOSE BLDC GLUCOMTR-MCNC: 92 MG/DL — SIGNIFICANT CHANGE UP (ref 70–99)
GLUCOSE SERPL-MCNC: 67 MG/DL — LOW (ref 70–99)
GRAM STN FLD: SIGNIFICANT CHANGE UP
HCT VFR BLD CALC: 33.8 % — LOW (ref 34.5–45)
HGB BLD-MCNC: 10.6 G/DL — LOW (ref 11.5–15.5)
MAGNESIUM SERPL-MCNC: 2.3 MG/DL — SIGNIFICANT CHANGE UP (ref 1.6–2.6)
MCHC RBC-ENTMCNC: 31.4 % — LOW (ref 32–36)
MCHC RBC-ENTMCNC: 33.4 PG — SIGNIFICANT CHANGE UP (ref 27–34)
MCV RBC AUTO: 106.6 FL — HIGH (ref 80–100)
NRBC # FLD: 0 K/UL — SIGNIFICANT CHANGE UP (ref 0–0)
PHOSPHATE SERPL-MCNC: 2.9 MG/DL — SIGNIFICANT CHANGE UP (ref 2.5–4.5)
PLATELET # BLD AUTO: 370 K/UL — SIGNIFICANT CHANGE UP (ref 150–400)
PMV BLD: 11 FL — SIGNIFICANT CHANGE UP (ref 7–13)
POTASSIUM SERPL-MCNC: 4.1 MMOL/L — SIGNIFICANT CHANGE UP (ref 3.5–5.3)
POTASSIUM SERPL-SCNC: 4.1 MMOL/L — SIGNIFICANT CHANGE UP (ref 3.5–5.3)
RBC # BLD: 3.17 M/UL — LOW (ref 3.8–5.2)
RBC # FLD: 19.8 % — HIGH (ref 10.3–14.5)
SODIUM SERPL-SCNC: 139 MMOL/L — SIGNIFICANT CHANGE UP (ref 135–145)
WBC # BLD: 9.83 K/UL — SIGNIFICANT CHANGE UP (ref 3.8–10.5)
WBC # FLD AUTO: 9.83 K/UL — SIGNIFICANT CHANGE UP (ref 3.8–10.5)

## 2020-09-02 PROCEDURE — 99232 SBSQ HOSP IP/OBS MODERATE 35: CPT

## 2020-09-02 RX ORDER — POLYETHYLENE GLYCOL 3350 17 G/17G
17 POWDER, FOR SOLUTION ORAL
Qty: 0 | Refills: 0 | DISCHARGE
Start: 2020-09-02

## 2020-09-02 RX ORDER — APIXABAN 2.5 MG/1
1 TABLET, FILM COATED ORAL
Qty: 60 | Refills: 0
Start: 2020-09-02 | End: 2020-10-01

## 2020-09-02 RX ORDER — NYSTATIN CREAM 100000 [USP'U]/G
1 CREAM TOPICAL
Qty: 60 | Refills: 0
Start: 2020-09-02 | End: 2020-10-01

## 2020-09-02 RX ADMIN — BUDESONIDE AND FORMOTEROL FUMARATE DIHYDRATE 2 PUFF(S): 160; 4.5 AEROSOL RESPIRATORY (INHALATION) at 09:12

## 2020-09-02 RX ADMIN — NYSTATIN CREAM 1 APPLICATION(S): 100000 CREAM TOPICAL at 05:50

## 2020-09-02 RX ADMIN — Medication 30 MILLIGRAM(S): at 05:50

## 2020-09-02 RX ADMIN — Medication 150 MICROGRAM(S): at 05:50

## 2020-09-02 RX ADMIN — APIXABAN 5 MILLIGRAM(S): 2.5 TABLET, FILM COATED ORAL at 05:50

## 2020-09-02 RX ADMIN — Medication 1 MILLIGRAM(S): at 11:45

## 2020-09-02 RX ADMIN — Medication 325 MILLIGRAM(S): at 11:45

## 2020-09-02 NOTE — PROGRESS NOTE ADULT - PROBLEM SELECTOR PLAN 2
-On weekly B12 injections given atrophic gastritis with positive parietal cell antibodies and an indeterminate intrinsic factor on Aug 5th  - received dose every saturday  -B12 >2000 per AM lab on 8/21  -persistence of symptoms that patient reports have only been present after a fall a few weeks ago, seen by neuro 8/09/20 on prior admission, symptoms thought to be secondary to B12 neuropathy, CT L spine from prior admission reviewed; patient very concerned about her symptoms, consider outpatient neuro f/u for reassurance

## 2020-09-02 NOTE — DISCHARGE NOTE NURSING/CASE MANAGEMENT/SOCIAL WORK - PATIENT PORTAL LINK FT
You can access the FollowMyHealth Patient Portal offered by [facility] by registering at the following website: [email or web address]. By joining [employer]’s FollowMyHealth portal, you will also be able to view your health information using other applications (apps) compatible with our system.

## 2020-09-02 NOTE — PROGRESS NOTE ADULT - PROBLEM SELECTOR PLAN 8
c/w ICS/LABA and KEVIN PRNs

## 2020-09-02 NOTE — PROGRESS NOTE ADULT - PROBLEM SELECTOR PROBLEM 7
Diabetes mellitus type 2, noninsulin dependent

## 2020-09-02 NOTE — PROGRESS NOTE ADULT - SUBJECTIVE AND OBJECTIVE BOX
Patient is a 75y old  Female who presents with a chief complaint of difficulty ambulating, chest discomfort, shortness of breath (30 Aug 2020 04:03)      SUBJECTIVE / OVERNIGHT EVENTS: No events overnight. Pt mentioned chest pain has resolved. And fixated on "I want to go to rehab". Denies shortness of breath, abdominal pain, n/v/d.    MEDICATIONS  (STANDING):  apixaban 5 milliGRAM(s) Oral every 12 hours  budesonide 160 MICROgram(s)/formoterol 4.5 MICROgram(s) Inhaler 2 Puff(s) Inhalation two times a day  ferrous    sulfate 325 milliGRAM(s) Oral daily  folic acid 1 milliGRAM(s) Oral daily  levothyroxine 150 MICROGram(s) Oral daily  NIFEdipine XL 30 milliGRAM(s) Oral daily  nystatin Powder 1 Application(s) Topical two times a day  simvastatin 20 milliGRAM(s) Oral at bedtime    MEDICATIONS  (PRN):  acetaminophen   Tablet .. 650 milliGRAM(s) Oral every 6 hours PRN Mild Pain (1 - 3), Moderate Pain (4 - 6)  ALBUTerol    90 MICROgram(s) HFA Inhaler 2 Puff(s) Inhalation every 6 hours PRN SOB  bisacodyl 5 milliGRAM(s) Oral daily PRN Constipation  polyethylene glycol 3350 17 Gram(s) Oral daily PRN Constipation  senna 2 Tablet(s) Oral at bedtime PRN Constipation        CAPILLARY BLOOD GLUCOSE      POCT Blood Glucose.: 90 mg/dL (30 Aug 2020 12:59)  POCT Blood Glucose.: 79 mg/dL (30 Aug 2020 08:53)  POCT Blood Glucose.: 102 mg/dL (29 Aug 2020 19:16)    Vital Signs Last 24 Hrs  T(C): 36.6 (30 Aug 2020 06:25), Max: 36.9 (29 Aug 2020 20:13)  T(F): 97.9 (30 Aug 2020 06:25), Max: 98.5 (29 Aug 2020 20:13)  HR: 57 (30 Aug 2020 06:25) (56 - 77)  BP: 146/68 (30 Aug 2020 06:25) (117/92 - 146/68)  BP(mean): --  RR: 16 (30 Aug 2020 06:25) (16 - 24)  SpO2: 100% (30 Aug 2020 06:25) (99% - 100%)      PHYSICAL EXAM:  GENERAL: NAD, well-developed, well-nourished  	HEAD:  Atraumatic, Normocephalic  	EYES: EOMI, PERRL, conjunctiva and sclera clear  	NECK: Supple, No JVD  	CHEST/LUNG: Clear to auscultation bilaterally; No wheezes, rales or rhonchi  	HEART: Regular rate and rhythm; No murmurs, rubs, or gallops, (+)S1, S2  	ABDOMEN: Soft, Nontender, Nondistended; Normal Bowel sounds   	EXTREMITIES:  2+ Peripheral Pulses, No clubbing, cyanosis, or edema  	PSYCH: normal mood and affect  	NEUROLOGY: AAOx3, non-focal, decreased sensation to light touch b/l LE to hips, strength 4+/5 b/l LE, 5/5 strength b/l UE; strength exam somewhat limited on L secondary to pain, CN II-XII intact; FTN intact b/l  SKIN: erythema with mild pain to palpation (reportedly pruritic) under b/l breasts, along waistline; small sacral skin tear and healing lesion; scattered hypopigments patches  LABS:                        9.5    8.17  )-----------( 392      ( 30 Aug 2020 06:07 )             30.6     08-30    138  |  104  |  14  ----------------------------<  93  5.3   |  20<L>  |  0.90    Ca    10.4      30 Aug 2020 12:00  Phos  3.0     08-30  Mg     2.3     08-30    TPro  7.6  /  Alb  3.7  /  TBili  0.6  /  DBili  x   /  AST  31  /  ALT  11  /  AlkPhos  64  08-30    PT/INR - ( 30 Aug 2020 06:07 )   PT: 15.7 SEC;   INR: 1.40          PTT - ( 30 Aug 2020 06:07 )  PTT:29.3 SEC
Patient is a 75y old  Female who presents with a chief complaint of difficulty ambulating, chest discomfort, shortness of breath (01 Sep 2020 08:08)      SUBJECTIVE / OVERNIGHT EVENTS: feels ok, CP free, undergoing TTE then NST     ROS:  No HA/DZ  No Vision changes   No CP, SOB  No N/V/D  No Edema  No Rash  NO weakness, numbness    MEDICATIONS  (STANDING):  apixaban 5 milliGRAM(s) Oral every 12 hours  budesonide 160 MICROgram(s)/formoterol 4.5 MICROgram(s) Inhaler 2 Puff(s) Inhalation two times a day  ferrous    sulfate 325 milliGRAM(s) Oral daily  folic acid 1 milliGRAM(s) Oral daily  levothyroxine 150 MICROGram(s) Oral daily  NIFEdipine XL 30 milliGRAM(s) Oral daily  nystatin Powder 1 Application(s) Topical two times a day  simvastatin 20 milliGRAM(s) Oral at bedtime    MEDICATIONS  (PRN):  acetaminophen   Tablet .. 650 milliGRAM(s) Oral every 6 hours PRN Mild Pain (1 - 3), Moderate Pain (4 - 6)  ALBUTerol    90 MICROgram(s) HFA Inhaler 2 Puff(s) Inhalation every 6 hours PRN SOB  bisacodyl 5 milliGRAM(s) Oral daily PRN Constipation  polyethylene glycol 3350 17 Gram(s) Oral daily PRN Constipation  senna 2 Tablet(s) Oral at bedtime PRN Constipation      T(C): 36.6 (09-01-20 @ 05:19)  HR: 57 (09-01-20 @ 05:19)  BP: 121/61 (09-01-20 @ 05:19)  RR: 18 (09-01-20 @ 05:19)  SpO2: 100% (09-01-20 @ 05:19)  CAPILLARY BLOOD GLUCOSE      POCT Blood Glucose.: 82 mg/dL (01 Sep 2020 07:09)  POCT Blood Glucose.: 92 mg/dL (31 Aug 2020 21:05)  POCT Blood Glucose.: 91 mg/dL (31 Aug 2020 16:36)  POCT Blood Glucose.: 119 mg/dL (31 Aug 2020 11:19)    I&O's Summary    31 Aug 2020 07:01  -  01 Sep 2020 07:00  --------------------------------------------------------  IN: 950 mL / OUT: 0 mL / NET: 950 mL        PHYSICAL EXAM:  GENERAL: NAD, well-developed, AOx3  HEAD:  Atraumatic, Normocephalic  EYES: EOMI, PERRL, conjunctiva and sclera clear  NECK: Supple, No JVD  CHEST/LUNG: Clear to auscultation bilaterally  HEART: Regular rate and rhythm; No murmurs, rubs, or gallops, No Edema  ABDOMEN: Soft, Nontender, Nondistended; Bowel sounds present  EXTREMITIES:  2+ Peripheral Pulses, No clubbing, cyanosis  PSYCH: No SI/HI  NEUROLOGY: non-focal  SKIN: No rashes or lesions    LABS:                        9.5    7.27  )-----------( 364      ( 01 Sep 2020 05:47 )             30.2     09-01    137  |  104  |  17  ----------------------------<  89  5.4<H>   |  23  |  0.94    Ca    10.2      01 Sep 2020 05:47  Phos  3.2     09-01  Mg     2.3     09-01    TPro  7.8  /  Alb  4.1  /  TBili  0.5  /  DBili  x   /  AST  14  /  ALT  8   /  AlkPhos  73  08-31                  RADIOLOGY & ADDITIONAL TESTS:    Imaging Personally Reviewed:    Consultant(s) Notes Reviewed:      Care Discussed with Consultants/Other Providers:
Patient is a 75y old  Female who presents with a chief complaint of difficulty ambulating, chest discomfort, shortness of breath (02 Sep 2020 08:33)      SUBJECTIVE / OVERNIGHT EVENTS: no events    ROS:  No HA/DZ  No Vision changes   No CP, SOB  No N/V/D  No Edema  No Rash  NO weakness, numbness    MEDICATIONS  (STANDING):  apixaban 5 milliGRAM(s) Oral every 12 hours  budesonide 160 MICROgram(s)/formoterol 4.5 MICROgram(s) Inhaler 2 Puff(s) Inhalation two times a day  cyanocobalamin Injectable 1000 MICROGram(s) IntraMuscular daily  ferrous    sulfate 325 milliGRAM(s) Oral daily  folic acid 1 milliGRAM(s) Oral daily  levothyroxine 150 MICROGram(s) Oral daily  NIFEdipine XL 30 milliGRAM(s) Oral daily  nystatin Powder 1 Application(s) Topical two times a day  simvastatin 20 milliGRAM(s) Oral at bedtime    MEDICATIONS  (PRN):  acetaminophen   Tablet .. 650 milliGRAM(s) Oral every 6 hours PRN Mild Pain (1 - 3), Moderate Pain (4 - 6)  ALBUTerol    90 MICROgram(s) HFA Inhaler 2 Puff(s) Inhalation every 6 hours PRN SOB  bisacodyl 5 milliGRAM(s) Oral daily PRN Constipation  polyethylene glycol 3350 17 Gram(s) Oral daily PRN Constipation  senna 2 Tablet(s) Oral at bedtime PRN Constipation      T(C): 36.7 (09-02-20 @ 04:46)  HR: 66 (09-02-20 @ 04:46)  BP: 121/68 (09-02-20 @ 04:46)  RR: 18 (09-02-20 @ 04:46)  SpO2: 100% (09-02-20 @ 04:46)  CAPILLARY BLOOD GLUCOSE      POCT Blood Glucose.: 92 mg/dL (02 Sep 2020 08:02)  POCT Blood Glucose.: 91 mg/dL (01 Sep 2020 21:26)  POCT Blood Glucose.: 83 mg/dL (01 Sep 2020 16:43)  POCT Blood Glucose.: 84 mg/dL (01 Sep 2020 12:35)    I&O's Summary    01 Sep 2020 07:01  -  02 Sep 2020 07:00  --------------------------------------------------------  IN: 1085 mL / OUT: 0 mL / NET: 1085 mL        PHYSICAL EXAM:  GENERAL: NAD, well-developed, AOx3  HEAD:  Atraumatic, Normocephalic  EYES: EOMI, PERRL, conjunctiva and sclera clear  NECK: Supple, No JVD  CHEST/LUNG: Clear to auscultation bilaterally  HEART: Regular rate and rhythm; No murmurs, rubs, or gallops, No Edema  ABDOMEN: Soft, Nontender, Nondistended; Bowel sounds present  EXTREMITIES:  2+ Peripheral Pulses, No clubbing, cyanosis  PSYCH: No SI/HI  NEUROLOGY: non-focal  SKIN: No rashes or lesions    LABS:                        10.6   9.83  )-----------( 370      ( 02 Sep 2020 05:47 )             33.8     09-02    139  |  103  |  18  ----------------------------<  67<L>  4.1   |  24  |  0.94    Ca    10.6<H>      02 Sep 2020 05:47  Phos  2.9     09-02  Mg     2.3     09-02                    RADIOLOGY & ADDITIONAL TESTS:    Imaging Personally Reviewed:    Consultant(s) Notes Reviewed:      Care Discussed with Consultants/Other Providers:
Patient is a 75y old  Female who presents with a chief complaint of difficulty ambulating, chest discomfort, shortness of breath (31 Aug 2020 08:04)      SUBJECTIVE / OVERNIGHT EVENTS: says feels better today - walked to the bathroom with walker wo issues, rash resolving     ROS:  No HA/DZ  No Vision changes   No CP, SOB  No N/V/D  No Edema  NO weakness, numbness    MEDICATIONS  (STANDING):  apixaban 5 milliGRAM(s) Oral every 12 hours  budesonide 160 MICROgram(s)/formoterol 4.5 MICROgram(s) Inhaler 2 Puff(s) Inhalation two times a day  ferrous    sulfate 325 milliGRAM(s) Oral daily  folic acid 1 milliGRAM(s) Oral daily  levothyroxine 150 MICROGram(s) Oral daily  NIFEdipine XL 30 milliGRAM(s) Oral daily  nystatin Powder 1 Application(s) Topical two times a day  simvastatin 20 milliGRAM(s) Oral at bedtime    MEDICATIONS  (PRN):  acetaminophen   Tablet .. 650 milliGRAM(s) Oral every 6 hours PRN Mild Pain (1 - 3), Moderate Pain (4 - 6)  ALBUTerol    90 MICROgram(s) HFA Inhaler 2 Puff(s) Inhalation every 6 hours PRN SOB  bisacodyl 5 milliGRAM(s) Oral daily PRN Constipation  polyethylene glycol 3350 17 Gram(s) Oral daily PRN Constipation  senna 2 Tablet(s) Oral at bedtime PRN Constipation      T(C): 36.7 (08-31-20 @ 10:00)  HR: 58 (08-31-20 @ 10:00)  BP: 130/75 (08-31-20 @ 10:00)  RR: 17 (08-31-20 @ 10:00)  SpO2: 100% (08-31-20 @ 10:00)  CAPILLARY BLOOD GLUCOSE      POCT Blood Glucose.: 119 mg/dL (31 Aug 2020 11:19)  POCT Blood Glucose.: 87 mg/dL (31 Aug 2020 07:16)  POCT Blood Glucose.: 94 mg/dL (30 Aug 2020 21:23)  POCT Blood Glucose.: 83 mg/dL (30 Aug 2020 16:55)  POCT Blood Glucose.: 90 mg/dL (30 Aug 2020 12:59)    I&O's Summary    31 Aug 2020 07:01  -  31 Aug 2020 11:49  --------------------------------------------------------  IN: 175 mL / OUT: 0 mL / NET: 175 mL        PHYSICAL EXAM:  GENERAL: NAD, well-developed, AOx3  HEAD:  Atraumatic, Normocephalic  EYES: EOMI, PERRL, conjunctiva and sclera clear  NECK: Supple, No JVD  CHEST/LUNG: Clear to auscultation bilaterally  HEART: Regular rate and rhythm; No murmurs, rubs, or gallops, No Edema  ABDOMEN: Soft, Nontender, Nondistended; Bowel sounds present  EXTREMITIES:  2+ Peripheral Pulses, No clubbing, cyanosis  PSYCH: No SI/HI  NEUROLOGY: non-focal  SKIN: psoriatic rash     LABS:                        10.4   8.51  )-----------( 380      ( 31 Aug 2020 06:17 )             34.5     08-31    142  |  107  |  14  ----------------------------<  80  4.4   |  23  |  1.04    Ca    10.4      31 Aug 2020 06:17  Phos  3.0     08-30  Mg     2.3     08-30    TPro  7.8  /  Alb  4.1  /  TBili  0.5  /  DBili  x   /  AST  14  /  ALT  8   /  AlkPhos  73  08-31    PT/INR - ( 30 Aug 2020 06:07 )   PT: 15.7 SEC;   INR: 1.40          PTT - ( 30 Aug 2020 06:07 )  PTT:29.3 SEC              RADIOLOGY & ADDITIONAL TESTS:    Imaging Personally Reviewed:    Consultant(s) Notes Reviewed:      Care Discussed with Consultants/Other Providers:
Subjective: Patient seen and examined. No new events except as noted.     SUBJECTIVE/ROS:  NAD      MEDICATIONS:  MEDICATIONS  (STANDING):  apixaban 5 milliGRAM(s) Oral every 12 hours  budesonide 160 MICROgram(s)/formoterol 4.5 MICROgram(s) Inhaler 2 Puff(s) Inhalation two times a day  ferrous    sulfate 325 milliGRAM(s) Oral daily  folic acid 1 milliGRAM(s) Oral daily  levothyroxine 150 MICROGram(s) Oral daily  NIFEdipine XL 30 milliGRAM(s) Oral daily  nystatin Powder 1 Application(s) Topical two times a day  simvastatin 20 milliGRAM(s) Oral at bedtime      PHYSICAL EXAM:  T(C): 36.6 (09-01-20 @ 05:19), Max: 36.7 (08-31-20 @ 10:00)  HR: 57 (09-01-20 @ 05:19) (57 - 79)  BP: 121/61 (09-01-20 @ 05:19) (119/60 - 130/75)  RR: 18 (09-01-20 @ 05:19) (16 - 18)  SpO2: 100% (09-01-20 @ 05:19) (100% - 100%)  Wt(kg): --  I&O's Summary    31 Aug 2020 07:01  -  01 Sep 2020 07:00  --------------------------------------------------------  IN: 950 mL / OUT: 0 mL / NET: 950 mL            JVP: Normal  Neck: supple  Lung: clear   CV: S1 S2 , Murmur:  Abd: soft  Ext: No edema  neuro: Awake / alert  Psych: flat affect  Skin: normal``    LABS/DATA:    CARDIAC MARKERS:                                9.5    7.27  )-----------( 364      ( 01 Sep 2020 05:47 )             30.2     09-01    137  |  104  |  17  ----------------------------<  89  5.4<H>   |  23  |  0.94    Ca    10.2      01 Sep 2020 05:47  Phos  3.2     09-01  Mg     2.3     09-01    TPro  7.8  /  Alb  4.1  /  TBili  0.5  /  DBili  x   /  AST  14  /  ALT  8   /  AlkPhos  73  08-31    proBNP:   Lipid Profile:   HgA1c:   TSH:     TELE:  EKG:
Subjective: Patient seen and examined. No new events except as noted.     SUBJECTIVE/ROS:  No chest pain, dyspnea, palpitation, or dizziness.       MEDICATIONS:  MEDICATIONS  (STANDING):  apixaban 5 milliGRAM(s) Oral every 12 hours  budesonide 160 MICROgram(s)/formoterol 4.5 MICROgram(s) Inhaler 2 Puff(s) Inhalation two times a day  cyanocobalamin Injectable 1000 MICROGram(s) IntraMuscular daily  ferrous    sulfate 325 milliGRAM(s) Oral daily  folic acid 1 milliGRAM(s) Oral daily  levothyroxine 150 MICROGram(s) Oral daily  NIFEdipine XL 30 milliGRAM(s) Oral daily  nystatin Powder 1 Application(s) Topical two times a day  simvastatin 20 milliGRAM(s) Oral at bedtime      PHYSICAL EXAM:  T(C): 36.7 (09-02-20 @ 04:46), Max: 37 (09-01-20 @ 14:00)  HR: 66 (09-02-20 @ 04:46) (64 - 87)  BP: 121/68 (09-02-20 @ 04:46) (115/72 - 124/85)  RR: 18 (09-02-20 @ 04:46) (17 - 18)  SpO2: 100% (09-02-20 @ 04:46) (97% - 100%)  Wt(kg): --  I&O's Summary    01 Sep 2020 07:01  -  02 Sep 2020 07:00  --------------------------------------------------------  IN: 1085 mL / OUT: 0 mL / NET: 1085 mL            JVP: Normal  Neck: supple  Lung: clear   CV: S1 S2 , Murmur:  Abd: soft  Ext: No edema  neuro: Awake / alert  Psych: flat affect  Skin: normal``    LABS/DATA:    CARDIAC MARKERS:                                10.6   9.83  )-----------( 370      ( 02 Sep 2020 05:47 )             33.8     09-01    137  |  104  |  17  ----------------------------<  89  5.4<H>   |  23  |  0.94    Ca    10.2      01 Sep 2020 05:47  Phos  3.2     09-01  Mg     2.3     09-01      proBNP:   Lipid Profile:   HgA1c:   TSH:     TELE:  EKG:  < from: Transthoracic Echocardiogram (09.01.20 @ 09:27) >  CONCLUSIONS:  1. Mitral annular calcification, otherwise normal mitral  valve. Minimal mitral regurgitation.  2. Calcified trileaflet aortic valve with normal opening.  Mild aortic regurgitation.  3. Normal left ventricular internal dimensions and wall  thicknesses.  4. Endocardium not well visualized; grossly normal left  ventricular systolic function.  5. Unable to accurately evaluate right ventricular size or  systolic function.  6. Estimated right ventricular systolic pressureequals 40  mm Hg, assuming right atrial pressure equals 10 mm Hg,  consistent with mild pulmonary hypertension.  ------------------------------------------------------------------------  Confirmed on  9/1/2020 - 11:02:56 by Elia Chong M.D.  ------------------------------------------------------------------------    < end of copied text >  < from: Nuclear Stress Test-Pharmacologic (09.01.20 @ 10:59) >  IMPRESSIONS:Normal Study  * Myocardial Perfusion SPECT results arenormal.  * Review of raw data shows: Extensive splanchnic uptake,  The study is of adequate technical quality  * The left ventricle was normal in size. Normal myocardial  perfusion scan,with no evidence of infarction or inducible  ischemia.  * Post-stress gated wall motion analysis was performed  (LVEF > 70%;LVEDV = 55 ml.), revealing normal LV function.  RV function appeared normal.  ------------------------------------------------------------------------  Confirmed on  9/1/2020 - 13:39:04 by Baltazar iGron M.D.  ------------------------------------------------------------------------    < end of copied text >

## 2020-09-02 NOTE — PROGRESS NOTE ADULT - PROBLEM SELECTOR PLAN 7
hold metformin while inpatient  cc diet while inpatient   A1c 5.4 % (08.04.20 @ 22:00)

## 2020-09-02 NOTE — PROGRESS NOTE ADULT - ASSESSMENT
75-year-old female with NIDDM2, HTN, and recent admission for B12 deficiency 2/2 atrophic gastritis complicated by lower extremity neuropathy and bilateral DVTs presenting with sudden onset of chest tightness associated with shortness of breath and palpitations, low concern for ACS, possibly acute anxiety.
Chest pain   no evidence of acute MI    awaiting echo , stress test     HTN  stable  cont nifedipine     HLD  on statin     DVT   on a/c
Chest pain   no evidence of acute MI    unremarkable echo , stress test     HTN  stable  cont nifedipine     HLD  on statin     DVT   on a/c

## 2020-09-02 NOTE — PROVIDER CONTACT NOTE (CRITICAL VALUE NOTIFICATION) - ASSESSMENT
VS: T- 97.7, HR- 87, BP-115/72, RR 18, spo2 97% on rm air. No complaints of pain. Pt denies chest pain, SOB, dizziness, H/A.

## 2020-09-02 NOTE — PROGRESS NOTE ADULT - PROBLEM SELECTOR PLAN 3
nystatin topical
nystatin topical  resolving  psoriasis - controlled, no e/o flare

## 2020-09-02 NOTE — PROGRESS NOTE ADULT - PROBLEM SELECTOR PLAN 4
c/w home medications with hold parameters

## 2020-09-02 NOTE — PROGRESS NOTE ADULT - REASON FOR ADMISSION
difficulty ambulating, chest discomfort, shortness of breath

## 2020-09-03 LAB
CULTURE RESULTS: SIGNIFICANT CHANGE UP
SPECIMEN SOURCE: SIGNIFICANT CHANGE UP

## 2020-09-04 LAB
CULTURE RESULTS: SIGNIFICANT CHANGE UP
SPECIMEN SOURCE: SIGNIFICANT CHANGE UP

## 2020-09-07 LAB
CULTURE RESULTS: SIGNIFICANT CHANGE UP
CULTURE RESULTS: SIGNIFICANT CHANGE UP
SPECIMEN SOURCE: SIGNIFICANT CHANGE UP
SPECIMEN SOURCE: SIGNIFICANT CHANGE UP

## 2020-09-10 ENCOUNTER — INPATIENT (INPATIENT)
Facility: HOSPITAL | Age: 75
LOS: 1 days | Discharge: HOME | End: 2020-09-12
Attending: STUDENT IN AN ORGANIZED HEALTH CARE EDUCATION/TRAINING PROGRAM | Admitting: STUDENT IN AN ORGANIZED HEALTH CARE EDUCATION/TRAINING PROGRAM
Payer: MEDICARE

## 2020-09-10 VITALS
OXYGEN SATURATION: 100 % | HEART RATE: 81 BPM | SYSTOLIC BLOOD PRESSURE: 144 MMHG | RESPIRATION RATE: 16 BRPM | DIASTOLIC BLOOD PRESSURE: 86 MMHG | TEMPERATURE: 98 F | HEIGHT: 62 IN

## 2020-09-10 DIAGNOSIS — I82.409 ACUTE EMBOLISM AND THROMBOSIS OF UNSPECIFIED DEEP VEINS OF UNSPECIFIED LOWER EXTREMITY: ICD-10-CM

## 2020-09-10 DIAGNOSIS — E03.9 HYPOTHYROIDISM, UNSPECIFIED: ICD-10-CM

## 2020-09-10 DIAGNOSIS — E11.9 TYPE 2 DIABETES MELLITUS WITHOUT COMPLICATIONS: ICD-10-CM

## 2020-09-10 DIAGNOSIS — Z98.890 OTHER SPECIFIED POSTPROCEDURAL STATES: Chronic | ICD-10-CM

## 2020-09-10 DIAGNOSIS — Z02.9 ENCOUNTER FOR ADMINISTRATIVE EXAMINATIONS, UNSPECIFIED: ICD-10-CM

## 2020-09-10 DIAGNOSIS — L80 VITILIGO: ICD-10-CM

## 2020-09-10 DIAGNOSIS — R10.13 EPIGASTRIC PAIN: ICD-10-CM

## 2020-09-10 DIAGNOSIS — L40.9 PSORIASIS, UNSPECIFIED: ICD-10-CM

## 2020-09-10 DIAGNOSIS — I10 ESSENTIAL (PRIMARY) HYPERTENSION: ICD-10-CM

## 2020-09-10 DIAGNOSIS — Z29.9 ENCOUNTER FOR PROPHYLACTIC MEASURES, UNSPECIFIED: ICD-10-CM

## 2020-09-10 DIAGNOSIS — R42 DIZZINESS AND GIDDINESS: ICD-10-CM

## 2020-09-10 PROBLEM — I21.9 ACUTE MYOCARDIAL INFARCTION, UNSPECIFIED: Chronic | Status: ACTIVE | Noted: 2020-08-30

## 2020-09-10 LAB
ALBUMIN SERPL ELPH-MCNC: 4.3 G/DL — SIGNIFICANT CHANGE UP (ref 3.3–5)
ALP SERPL-CCNC: 68 U/L — SIGNIFICANT CHANGE UP (ref 40–120)
ALT FLD-CCNC: 8 U/L — SIGNIFICANT CHANGE UP (ref 4–33)
ANION GAP SERPL CALC-SCNC: 12 MMO/L — SIGNIFICANT CHANGE UP (ref 7–14)
ANISOCYTOSIS BLD QL: SIGNIFICANT CHANGE UP
AST SERPL-CCNC: 14 U/L — SIGNIFICANT CHANGE UP (ref 4–32)
BASOPHILS # BLD AUTO: 0.12 K/UL — SIGNIFICANT CHANGE UP (ref 0–0.2)
BASOPHILS NFR BLD AUTO: 1.1 % — SIGNIFICANT CHANGE UP (ref 0–2)
BILIRUB SERPL-MCNC: 0.5 MG/DL — SIGNIFICANT CHANGE UP (ref 0.2–1.2)
BUN SERPL-MCNC: 19 MG/DL — SIGNIFICANT CHANGE UP (ref 7–23)
CALCIUM SERPL-MCNC: 11 MG/DL — HIGH (ref 8.4–10.5)
CHLORIDE SERPL-SCNC: 104 MMOL/L — SIGNIFICANT CHANGE UP (ref 98–107)
CO2 SERPL-SCNC: 23 MMOL/L — SIGNIFICANT CHANGE UP (ref 22–31)
CREAT SERPL-MCNC: 1.11 MG/DL — SIGNIFICANT CHANGE UP (ref 0.5–1.3)
EOSINOPHIL # BLD AUTO: 0.63 K/UL — HIGH (ref 0–0.5)
EOSINOPHIL NFR BLD AUTO: 5.8 % — SIGNIFICANT CHANGE UP (ref 0–6)
GLUCOSE SERPL-MCNC: 90 MG/DL — SIGNIFICANT CHANGE UP (ref 70–99)
HCT VFR BLD CALC: 31 % — LOW (ref 34.5–45)
HGB BLD-MCNC: 9.7 G/DL — LOW (ref 11.5–15.5)
IMM GRANULOCYTES NFR BLD AUTO: 0.1 % — SIGNIFICANT CHANGE UP (ref 0–1.5)
LIDOCAIN IGE QN: 16.9 U/L — SIGNIFICANT CHANGE UP (ref 7–60)
LYMPHOCYTES # BLD AUTO: 2.78 K/UL — SIGNIFICANT CHANGE UP (ref 1–3.3)
LYMPHOCYTES # BLD AUTO: 25.5 % — SIGNIFICANT CHANGE UP (ref 13–44)
MACROCYTES BLD QL: SIGNIFICANT CHANGE UP
MAGNESIUM SERPL-MCNC: 1.9 MG/DL — SIGNIFICANT CHANGE UP (ref 1.6–2.6)
MANUAL SMEAR VERIFICATION: SIGNIFICANT CHANGE UP
MCHC RBC-ENTMCNC: 31.3 % — LOW (ref 32–36)
MCHC RBC-ENTMCNC: 32.9 PG — SIGNIFICANT CHANGE UP (ref 27–34)
MCV RBC AUTO: 105.1 FL — HIGH (ref 80–100)
MONOCYTES # BLD AUTO: 0.55 K/UL — SIGNIFICANT CHANGE UP (ref 0–0.9)
MONOCYTES NFR BLD AUTO: 5 % — SIGNIFICANT CHANGE UP (ref 2–14)
NEUTROPHILS # BLD AUTO: 6.81 K/UL — SIGNIFICANT CHANGE UP (ref 1.8–7.4)
NEUTROPHILS NFR BLD AUTO: 62.5 % — SIGNIFICANT CHANGE UP (ref 43–77)
NRBC # FLD: 0 K/UL — SIGNIFICANT CHANGE UP (ref 0–0)
PHOSPHATE SERPL-MCNC: 2.6 MG/DL — SIGNIFICANT CHANGE UP (ref 2.5–4.5)
PLATELET # BLD AUTO: 315 K/UL — SIGNIFICANT CHANGE UP (ref 150–400)
PLATELET COUNT - ESTIMATE: NORMAL — SIGNIFICANT CHANGE UP
PMV BLD: 11.7 FL — SIGNIFICANT CHANGE UP (ref 7–13)
POTASSIUM SERPL-MCNC: 4.1 MMOL/L — SIGNIFICANT CHANGE UP (ref 3.5–5.3)
POTASSIUM SERPL-SCNC: 4.1 MMOL/L — SIGNIFICANT CHANGE UP (ref 3.5–5.3)
PROT SERPL-MCNC: 7.8 G/DL — SIGNIFICANT CHANGE UP (ref 6–8.3)
RBC # BLD: 2.95 M/UL — LOW (ref 3.8–5.2)
RBC # FLD: 19.8 % — HIGH (ref 10.3–14.5)
SARS-COV-2 RNA SPEC QL NAA+PROBE: SIGNIFICANT CHANGE UP
SODIUM SERPL-SCNC: 139 MMOL/L — SIGNIFICANT CHANGE UP (ref 135–145)
TROPONIN T, HIGH SENSITIVITY: 9 NG/L — SIGNIFICANT CHANGE UP (ref ?–14)
TROPONIN T, HIGH SENSITIVITY: 9 NG/L — SIGNIFICANT CHANGE UP (ref ?–14)
WBC # BLD: 10.9 K/UL — HIGH (ref 3.8–10.5)
WBC # FLD AUTO: 10.9 K/UL — HIGH (ref 3.8–10.5)

## 2020-09-10 PROCEDURE — 99285 EMERGENCY DEPT VISIT HI MDM: CPT

## 2020-09-10 PROCEDURE — 99223 1ST HOSP IP/OBS HIGH 75: CPT

## 2020-09-10 PROCEDURE — 74177 CT ABD & PELVIS W/CONTRAST: CPT | Mod: 26

## 2020-09-10 PROCEDURE — 76705 ECHO EXAM OF ABDOMEN: CPT | Mod: 26

## 2020-09-10 PROCEDURE — 99222 1ST HOSP IP/OBS MODERATE 55: CPT | Mod: GC

## 2020-09-10 RX ORDER — GLUCAGON INJECTION, SOLUTION 0.5 MG/.1ML
1 INJECTION, SOLUTION SUBCUTANEOUS ONCE
Refills: 0 | Status: DISCONTINUED | OUTPATIENT
Start: 2020-09-10 | End: 2020-09-12

## 2020-09-10 RX ORDER — DEXTROSE 50 % IN WATER 50 %
25 SYRINGE (ML) INTRAVENOUS ONCE
Refills: 0 | Status: DISCONTINUED | OUTPATIENT
Start: 2020-09-10 | End: 2020-09-12

## 2020-09-10 RX ORDER — MORPHINE SULFATE 50 MG/1
2 CAPSULE, EXTENDED RELEASE ORAL ONCE
Refills: 0 | Status: DISCONTINUED | OUTPATIENT
Start: 2020-09-10 | End: 2020-09-10

## 2020-09-10 RX ORDER — DEXTROSE 50 % IN WATER 50 %
12.5 SYRINGE (ML) INTRAVENOUS ONCE
Refills: 0 | Status: DISCONTINUED | OUTPATIENT
Start: 2020-09-10 | End: 2020-09-12

## 2020-09-10 RX ORDER — FAMOTIDINE 10 MG/ML
20 INJECTION INTRAVENOUS ONCE
Refills: 0 | Status: COMPLETED | OUTPATIENT
Start: 2020-09-10 | End: 2020-09-10

## 2020-09-10 RX ORDER — ACETAMINOPHEN 500 MG
650 TABLET ORAL EVERY 6 HOURS
Refills: 0 | Status: DISCONTINUED | OUTPATIENT
Start: 2020-09-10 | End: 2020-09-12

## 2020-09-10 RX ORDER — INSULIN LISPRO 100/ML
VIAL (ML) SUBCUTANEOUS
Refills: 0 | Status: DISCONTINUED | OUTPATIENT
Start: 2020-09-10 | End: 2020-09-12

## 2020-09-10 RX ORDER — APIXABAN 2.5 MG/1
5 TABLET, FILM COATED ORAL EVERY 12 HOURS
Refills: 0 | Status: DISCONTINUED | OUTPATIENT
Start: 2020-09-10 | End: 2020-09-12

## 2020-09-10 RX ORDER — GABAPENTIN 400 MG/1
300 CAPSULE ORAL
Refills: 0 | Status: DISCONTINUED | OUTPATIENT
Start: 2020-09-10 | End: 2020-09-12

## 2020-09-10 RX ORDER — GABAPENTIN 400 MG/1
1 CAPSULE ORAL
Qty: 0 | Refills: 0 | DISCHARGE

## 2020-09-10 RX ORDER — LORATADINE 10 MG/1
10 TABLET ORAL DAILY
Refills: 0 | Status: DISCONTINUED | OUTPATIENT
Start: 2020-09-10 | End: 2020-09-12

## 2020-09-10 RX ORDER — SODIUM CHLORIDE 9 MG/ML
1000 INJECTION INTRAMUSCULAR; INTRAVENOUS; SUBCUTANEOUS
Refills: 0 | Status: DISCONTINUED | OUTPATIENT
Start: 2020-09-10 | End: 2020-09-12

## 2020-09-10 RX ORDER — SIMETHICONE 80 MG/1
80 TABLET, CHEWABLE ORAL ONCE
Refills: 0 | Status: COMPLETED | OUTPATIENT
Start: 2020-09-10 | End: 2020-09-10

## 2020-09-10 RX ORDER — DEXTROSE 50 % IN WATER 50 %
15 SYRINGE (ML) INTRAVENOUS ONCE
Refills: 0 | Status: DISCONTINUED | OUTPATIENT
Start: 2020-09-10 | End: 2020-09-12

## 2020-09-10 RX ORDER — SODIUM CHLORIDE 9 MG/ML
1000 INJECTION, SOLUTION INTRAVENOUS
Refills: 0 | Status: DISCONTINUED | OUTPATIENT
Start: 2020-09-10 | End: 2020-09-12

## 2020-09-10 RX ORDER — FERROUS SULFATE 325(65) MG
325 TABLET ORAL DAILY
Refills: 0 | Status: DISCONTINUED | OUTPATIENT
Start: 2020-09-10 | End: 2020-09-12

## 2020-09-10 RX ORDER — NIFEDIPINE 30 MG
30 TABLET, EXTENDED RELEASE 24 HR ORAL DAILY
Refills: 0 | Status: DISCONTINUED | OUTPATIENT
Start: 2020-09-10 | End: 2020-09-12

## 2020-09-10 RX ORDER — FOLIC ACID 0.8 MG
1 TABLET ORAL DAILY
Refills: 0 | Status: DISCONTINUED | OUTPATIENT
Start: 2020-09-10 | End: 2020-09-12

## 2020-09-10 RX ORDER — APIXABAN 2.5 MG/1
2.5 TABLET, FILM COATED ORAL EVERY 12 HOURS
Refills: 0 | Status: DISCONTINUED | OUTPATIENT
Start: 2020-09-10 | End: 2020-09-10

## 2020-09-10 RX ORDER — OXYCODONE AND ACETAMINOPHEN 5; 325 MG/1; MG/1
1 TABLET ORAL ONCE
Refills: 0 | Status: DISCONTINUED | OUTPATIENT
Start: 2020-09-10 | End: 2020-09-10

## 2020-09-10 RX ORDER — BUDESONIDE AND FORMOTEROL FUMARATE DIHYDRATE 160; 4.5 UG/1; UG/1
2 AEROSOL RESPIRATORY (INHALATION)
Refills: 0 | Status: DISCONTINUED | OUTPATIENT
Start: 2020-09-10 | End: 2020-09-12

## 2020-09-10 RX ORDER — SIMVASTATIN 20 MG/1
20 TABLET, FILM COATED ORAL AT BEDTIME
Refills: 0 | Status: DISCONTINUED | OUTPATIENT
Start: 2020-09-10 | End: 2020-09-12

## 2020-09-10 RX ORDER — PANTOPRAZOLE SODIUM 20 MG/1
40 TABLET, DELAYED RELEASE ORAL
Refills: 0 | Status: DISCONTINUED | OUTPATIENT
Start: 2020-09-10 | End: 2020-09-12

## 2020-09-10 RX ORDER — LEVOTHYROXINE SODIUM 125 MCG
150 TABLET ORAL DAILY
Refills: 0 | Status: DISCONTINUED | OUTPATIENT
Start: 2020-09-10 | End: 2020-09-12

## 2020-09-10 RX ORDER — ALBUTEROL 90 UG/1
2 AEROSOL, METERED ORAL EVERY 6 HOURS
Refills: 0 | Status: DISCONTINUED | OUTPATIENT
Start: 2020-09-10 | End: 2020-09-12

## 2020-09-10 RX ADMIN — SODIUM CHLORIDE 70 MILLILITER(S): 9 INJECTION INTRAMUSCULAR; INTRAVENOUS; SUBCUTANEOUS at 11:34

## 2020-09-10 RX ADMIN — APIXABAN 5 MILLIGRAM(S): 2.5 TABLET, FILM COATED ORAL at 18:56

## 2020-09-10 RX ADMIN — OXYCODONE AND ACETAMINOPHEN 1 TABLET(S): 5; 325 TABLET ORAL at 06:16

## 2020-09-10 RX ADMIN — GABAPENTIN 300 MILLIGRAM(S): 400 CAPSULE ORAL at 18:57

## 2020-09-10 RX ADMIN — SODIUM CHLORIDE 70 MILLILITER(S): 9 INJECTION INTRAMUSCULAR; INTRAVENOUS; SUBCUTANEOUS at 22:32

## 2020-09-10 RX ADMIN — Medication 30 MILLILITER(S): at 03:22

## 2020-09-10 RX ADMIN — Medication 1 MILLIGRAM(S): at 18:56

## 2020-09-10 RX ADMIN — Medication 30 MILLILITER(S): at 15:39

## 2020-09-10 RX ADMIN — BUDESONIDE AND FORMOTEROL FUMARATE DIHYDRATE 2 PUFF(S): 160; 4.5 AEROSOL RESPIRATORY (INHALATION) at 21:06

## 2020-09-10 RX ADMIN — Medication 325 MILLIGRAM(S): at 18:57

## 2020-09-10 RX ADMIN — SIMETHICONE 80 MILLIGRAM(S): 80 TABLET, CHEWABLE ORAL at 16:09

## 2020-09-10 RX ADMIN — LORATADINE 10 MILLIGRAM(S): 10 TABLET ORAL at 18:57

## 2020-09-10 RX ADMIN — Medication 30 MILLIGRAM(S): at 18:57

## 2020-09-10 RX ADMIN — SIMVASTATIN 20 MILLIGRAM(S): 20 TABLET, FILM COATED ORAL at 21:06

## 2020-09-10 NOTE — H&P ADULT - PROBLEM SELECTOR PLAN 3
Patient w/ psoriasis not on medication per patient  - triamcinolone cream BID   - loratidine for itching, takes Allegra at home

## 2020-09-10 NOTE — ED PROVIDER NOTE - OBJECTIVE STATEMENT
76 y/o F w/ PMH DM, DVT on eliquis, HTN, MI presenting w/ epigastric pain. Trauma B. Reports around 6pm developed epigastric pain after taking her medicine. Unsure what medicine it was. Associated w/ feeling gassy and burping. Located across the epigastric area, does not radiate down or to back. Took tylenol w/ minimal relief. Felt similar pain on prior admission (Here 1 week ago for cardiac workup w/ negative Nuc stress per chart review). Denies fevers, chills, headache, dizziness, blurred vision, chest pain, cough, shortness of breath, n/v/d/c, urinary symptoms, MSK pain, rash.

## 2020-09-10 NOTE — H&P ADULT - NSHPPHYSICALEXAM_GEN_ALL_CORE
VITAL SIGNS:  T(F): 97.8 (09-10-20 @ 10:10), Max: 98.7 (09-10-20 @ 08:30)  HR: 52 (09-10-20 @ 10:10) (52 - 81)  BP: 126/60 (09-10-20 @ 10:10) (124/93 - 144/86)  BP(mean): --  RR: 18 (09-10-20 @ 10:10) (16 - 18)  SpO2: 100% (09-10-20 @ 10:10) (100% - 100%)    PHYSICAL EXAM:  Constitutional: WDWN resting comfortably in bed; NAD  HEENT: NC/AT, PERRL, EOMI, anicteric sclera, no nasal discharge; uvula midline, no oropharyngeal erythema or exudates; MMM  Neck: supple; no JVD or thyromegaly  Respiratory: CTA B/L; no W/R/R, no retractions  Cardiac: +S1/S2; RRR; no M/R/G; PMI non-displaced  Gastrointestinal: soft, NT/ND; no rebound or guarding; +BSx4  Genitourinary: normal external genitalia  Back: spine midline, no bony tenderness or step-offs; no CVAT B/L  Extremities: WWP, no clubbing or cyanosis; no peripheral edema  Musculoskeletal: NROM x4; no joint swelling, tenderness or erythema  Vascular: 2+ radial, femoral, DP/PT pulses B/L  Dermatologic: skin warm, dry and intact; no rashes, wounds, or scars  Lymphatic: no submandibular or cervical LAD  Neurologic: AAOx3; CNII-XII grossly intact; no focal deficits  Psychiatric: affect and characteristics of appearance, verbalizations, behaviors are appropriate, denies SI/HI/AH/VH VITAL SIGNS:  T(F): 97.8 (09-10-20 @ 10:10), Max: 98.7 (09-10-20 @ 08:30)  HR: 52 (09-10-20 @ 10:10) (52 - 81)  BP: 126/60 (09-10-20 @ 10:10) (124/93 - 144/86)  BP(mean): --  RR: 18 (09-10-20 @ 10:10) (16 - 18)  SpO2: 100% (09-10-20 @ 10:10) (100% - 100%)    PHYSICAL EXAM:  Constitutional: Elderly woman, WDWN resting comfortably in bed; NAD  HEENT: NC/AT, PERRL, EOMI, anicteric sclera, no nasal discharge; uvula midline, no oropharyngeal erythema or exudates; slightly dry mucous membranes   Neck: supple; no JVD  Respiratory: CTA B/L; no W/R/R, no retractions  Cardiac: +S1/S2; RRR; no M/R/G; PMI non-displaced  Gastrointestinal: soft, mildly distended, tenderness epigastric, Alberts sign +; no rebound or guarding; +BSx4  Genitourinary: no suprapubic or CVA tenderness   Extremities: WWP, no peripheral edema  Musculoskeletal: no joint swelling, tenderness or erythema  Vascular: 2+ radial, DP/PT pulses B/L  Dermatologic: skin warm, dry and intact; no rashes, wounds, or scars  Lymphatic: no submandibular or cervical LAD  Neurologic: AAOx3; CNII-XII grossly intact; no focal deficits  Psychiatric: affect and characteristics of appearance, verbalizations, behaviors are appropriate

## 2020-09-10 NOTE — ED PROVIDER NOTE - PMH
Diabetes  Type 2 diabetes  DVT (deep venous thrombosis), right  Bilateral LE DVT  Essential hypertension    Hypothyroid    Myocardial infarction  approximately 49y/o  Psoriasis    Vitiligo

## 2020-09-10 NOTE — CONSULT NOTE ADULT - ASSESSMENT
75F w/ DMII, DVT on eliquis, atrophic gastritis presenting w/ long-standing intermittent epigastric pain + gas distension. Normal labs and imaging are reassuring. Pt's symptoms improve w/ simethicone.     Impression:  #Abd pain/gasseous distension - unclear etiology, recent EGD r/o dangerous pathology i.e. gastric outlet obstruction or gastric cancer. H pylori neg, no e/o PUD. Neg lipase and normal imaging r/o pancreatitis. Not suspicious for ischemic etiology. Low suspicion for biliary etiology, but unable to properly visualize gallbladder on RUQ U/S  #Weight loss - nothing on EGD or CT A/P explains significant weight loss  #Atrophic gastritis    Recommendations:  - c/w simethicone q6h  - can try dicyclomine 20mg q12h while inpatient to see if there is any response  - no indication for repeat EGD at this time  - can consider obtaining HIDA scan to r/o biliary colic, though low suspicion at this time  - would get CT chest to r/o malignancy causing weight loss  - pt needs to follow outpatient w/ GI for further evaluation for abd bloating ([phone])    Clif Soto  Gastroenterology Fellow  NON-URGENT CONSULTS:  Please email [email or web address]  [email or web address]  AT NIGHT AND ON WEEKENDS:  Contact on-call GI fellow via answering service ([phone]) from 5pm-8am and on weekends/holidays  MONDAY-FRIDAY 8AM-5PM:  Available via [employer] Teams  Pager# [phone] ([facility]) or [phone] ([facility])  GI Phone# [phone] ([facility])

## 2020-09-10 NOTE — ED ADULT NURSE REASSESSMENT NOTE - NS ED NURSE REASSESS COMMENT FT1
RN unable to obtained IV access MD Rashid aware. Pt arrived back from US complaining of abdominal pain. MD Rashid put in US 20G IV placed to left arm. Repeat labs sent at this time. Pt pain level endorses to MD Rashid, awaiting orders at this time
pt returned from CT, no longer able to use US IV. MD Rashid aware, PO pain medication ordered for pt. Meds given. Vital stable as documented.
pt resting comfortably states pain has improved. Still complaining of squeezing pain in the abdomen. pt PO challenged at this time. MD aware, states she will reevaluate.

## 2020-09-10 NOTE — ED ADULT NURSE NOTE - CHIEF COMPLAINT QUOTE
pt c/o upper abdominal "squeezing pain" x1 week, worse with food or when taking her medications. also endorsing "a lot of gas and burping". denies N/V. appears uncomfortable in triage.

## 2020-09-10 NOTE — ED PROVIDER NOTE - CLINICAL SUMMARY MEDICAL DECISION MAKING FREE TEXT BOX
74 y/o F w/ PMH DM, DVT on eliquis, HTN, MI presenting w/ epigastric pain. Pt w/ epigastric tenderness on exam, remainder of abdomen soft and non tender. Low suspicion for ACS given recent negative nuc stress. Possible biliary pathology such as cholecystitis vs. biliary colic vs. pancreatitis given location of pain. Possible GERD/Gastritis. Plan for labs, EKG, meds, RUQ sono. Will reassess the need for additional interventions as clinically warranted.

## 2020-09-10 NOTE — ED ADULT NURSE NOTE - CHPI ED NUR SYMPTOMS NEG
no diarrhea/no blood in stool/no burning urination/no dysuria/no chills/no fever/no abdominal distension

## 2020-09-10 NOTE — ED ADULT NURSE NOTE - PMH
Diabetes  Type 2 diabetes  DVT (deep venous thrombosis), right  Bilateral LE DVT  Essential hypertension    Hypothyroid    Myocardial infarction  approximately 51y/o  Psoriasis    Vitiligo

## 2020-09-10 NOTE — ED ADULT NURSE NOTE - OBJECTIVE STATEMENT
received pt to [facility] A&Ox[facility], ambulatory with a cane. 76y/o female hx DM, HTN complaining of bilateral epigastric pain since 1800. Pt states she pain sharp, squeezing, and constant. Abdomen is soft, does not appear distended, and tender upon palpation. Resps are even and unlabored, Spo2 100% on RA. Denies chest pain, SOB, fever/chills, palpitations, nausea/vomiting. Md Rashid at bedside for eval, awaiting orders.

## 2020-09-10 NOTE — H&P ADULT - ASSESSMENT
Patient is a 75 year old woman w/ past medical history of DM, DVT Eliquis presenting w/ one week of epigastric pain. Patient states pain radiates to her RUQ and LUQ, patient w/o lower abdominal pain. CT abdomen and US abdomen w/o acute findings, calcification of gall bladder. Patient reports 40lb weight loss over a one month time span due to inability to tolerate PO. Admitted for epigastric pain of unclear etiology differential includes, PUD vs GERD vs gastritis vs ACS (unlikely troponin negative x 2 w/ recent normal stress test) vs biliary pathology vs gastroparesis.

## 2020-09-10 NOTE — H&P ADULT - NSICDXPASTMEDICALHX_GEN_ALL_CORE_FT
PAST MEDICAL HISTORY:  Diabetes Type 2 diabetes    DVT (deep venous thrombosis), right Bilateral LE DVT    Essential hypertension     Hypothyroid     Myocardial infarction approximately 51y/o    Psoriasis     Vitiligo

## 2020-09-10 NOTE — H&P ADULT - NSHPREVIEWOFSYSTEMS_GEN_ALL_CORE
REVIEW OF SYSTEMS:  CONSTITUTIONAL: Patient denies weakness, fevers or chills  EYES/ENT: Patient denies visual changes;  throat pain, + vertigo   NECK: Patient denies pain or stiffness  RESPIRATORY: Patient denies cough, wheezing, hemoptysis; denies shortness of breath  CARDIOVASCULAR: Patient denies chest pain or palpitations  GASTROINTESTINAL: + epigastric pain, denies nausea, vomiting, or hematemesis, diarrhea or constipation, melena or hematochezia. + dark stools   GENITOURINARY: Patient denies dysuria, frequency or hematuria  NEUROLOGICAL: Patient denies numbness or weakness  SKIN: Patient denies itching, burning, rashes, or lesions   All other review of systems is negative unless indicated above.

## 2020-09-10 NOTE — PHARMACOTHERAPY INTERVENTION NOTE - COMMENTS
Patient ordered for Apixaban 2.5 mg BID for continuation of DVT treatment. Due to new DVTs recently diagnosed in August 2020, recommend Apixaban 5 mg BID instead of 2.5 mg based on current phase of therapy.     Emeli Deleon, Pharm.D.  Clinical Pharmacy Coordinator  Spectra: [phone]

## 2020-09-10 NOTE — ED PROVIDER NOTE - PHYSICAL EXAMINATION
Gen: NAD, AOx3, able to make needs known, non-toxic  Head: NCAT  HEENT: EOMI, oral mucosa moist, normal conjunctiva  Lung: CTAB, no respiratory distress, no wheezes/rhonchi/rales B/L, speaking in full sentences  CV: RRR, no murmurs  Abd: non distended, soft, epigastric area tender to palpation  MSK: no visible deformities  Neuro: Appears non focal  Skin: Warm, well perfused  Psych: normal affect

## 2020-09-10 NOTE — H&P ADULT - NSHPLABSRESULTS_GEN_ALL_CORE
LABS:                         9.7    10.90 )-----------( 315      ( 10 Sep 2020 03:20 )             31.0     09-10    139  |  104  |  19  ----------------------------<  90  4.1   |  23  |  1.11    Ca    11.0<H>      10 Sep 2020 03:20  Phos  2.6     09-10  Mg     1.9     09-10    TPro  7.8  /  Alb  4.3  /  TBili  0.5  /  DBili  x   /  AST  14  /  ALT  8   /  AlkPhos  68  09-10                  RADIOLOGY, EKG & ADDITIONAL TESTS: Reviewed. LABS:                         9.7    10.90 )-----------( 315      ( 10 Sep 2020 03:20 )             31.0     09-10    139  |  104  |  19  ----------------------------<  90  4.1   |  23  |  1.11    Ca    11.0<H>      10 Sep 2020 03:20  Phos  2.6     09-10  Mg     1.9     09-10    TPro  7.8  /  Alb  4.3  /  TBili  0.5  /  DBili  x   /  AST  14  /  ALT  8   /  AlkPhos  68  09-10    CT abdomen:   Calcification of the gallbladder wall similar to the prior exams. Gallbladder normally distended. No pericholecystic inflammatory changes.  Reduced deep venous thrombosis in the left common iliac, external iliac, and femoral veins compared to prior exam from 8/5/2020. Persistent thrombus in the right femoral vein.  Diverticulosis without evidence of diverticulitis.    EKG: sinus bradycardia w/ nonspecific ST segment changes.     US Abdomen:   Gallbladder not well evaluated due to extensive shadowing in the region of the gallbladder fossa, likely related to known wall calcifications and cholelithiasis. HIDA scan can be obtained for further evaluation if there is clinical concern for acute cholecystitis.

## 2020-09-10 NOTE — ED ADULT TRIAGE NOTE - CHIEF COMPLAINT QUOTE
pt c/o upper abdominal "squeezing pain" x1 week, worse with food or when taking her medications. also endorsing "a lot of gas and burping". denies N/V. pt c/o upper abdominal "squeezing pain" x1 week, worse with food or when taking her medications. also endorsing "a lot of gas and burping". denies N/V. appears uncomfortable in triage.

## 2020-09-10 NOTE — ED PROVIDER NOTE - ATTENDING CONTRIBUTION TO CARE
HPI: 76 yo F with Past Medical History of NIDDM, DVT on eliquis, HTN, MI presenting w/ epigastric and RUQ pain, 10/10, started in epigastrium but feels it radiates across entire upper abd from left to right. Reports around 6pm developed epigastric pain after taking her medicine. Unsure what medicine it was. Associated w/ feeling gassy and burping. Took tylenol w/ minimal relief. Felt similar pain on prior admission (Here 1 week ago for cardiac workup w/ negative Nuc stress per chart review). Denies fevers, chills, headache, dizziness, blurred vision, chest pain, cough, shortness of breath, n/v/d/c, urinary symptoms, MSK pain, rash.  EXAM: uncomfortable appearing, tenderness to palpation HPI: 74 yo F with Past Medical History of NIDDM, DVT on eliquis, HTN, MI presenting w/ epigastric and RUQ pain, 10/10, started in epigastrium but feels it radiates across entire upper abd from left to right. Reports around 6pm developed epigastric pain after taking her medicine. Unsure what medicine it was. Associated w/ feeling bloated, nausea, burping but no vomiting or diarrhea. Took tylenol w/ minimal relief. Felt similar pain on prior admission (Here 1 week ago for cardiac workup w/ negative Nuc stress per chart review). Denies fevers, chills, headache, dizziness, blurred vision, chest pain, cough, shortness of breath, urinary symptoms, MSK pain, rash.  EXAM: uncomfortable appearing, tenderness to palpation in upper abd including RUQ with + murphys and guarding, no rebound, no masses including no pulsatile masses, no lower abd pain on palpation, no BLE edema, pulses equal and palpable x 4. heart RRR, lungs ctab.   MDM: pt with multiple medical problems that presents with epigastric and RUQ pain on exam. Possible GERD vs PUD vs gallstones vs cholecystitis. Will require labs, imaging and meds and reassess.

## 2020-09-10 NOTE — CONSULT NOTE ADULT - ATTENDING COMMENTS
Patient admitted with complaints of gas, bloating and belching. No significant constipation reported.   Underwent recent EGD with evidence of atrophic gastritis.   CTAP unremarkable for acute pathology. Calcified GB noted, but unlikely cause of her acute symptoms. Pending course, can consider HIDA for further evaluation.  Would trial simethicone and anti-spasmotic for symptom management.   Given significant weight loss, would also r/o malignancy with pan-scan.

## 2020-09-10 NOTE — ED ADULT NURSE NOTE - NSIMPLEMENTINTERV_GEN_ALL_ED
Implemented All Fall Risk Interventions:  [place] to call system. Call bell, personal items and telephone within reach. Instruct patient to call for assistance. Room bathroom lighting operational. Non-slip footwear when patient is off stretcher. Physically safe environment: no spills, clutter or unnecessary equipment. Stretcher in lowest position, wheels locked, appropriate side rails in place. Provide visual cue, wrist band, yellow gown, etc. Monitor gait and stability. Monitor for mental status changes and reorient to person, place, and time. Review medications for side effects contributing to fall risk. Reinforce activity limits and safety measures with patient and family.

## 2020-09-10 NOTE — H&P ADULT - PROBLEM SELECTOR PLAN 2
Patient w/ dizziness that is worsened w/ standing, symptomatic anemia?   - orthostatics   - c/w IVF, assess for improvement of symptoms   #Hypercalcemia  - patient w/ Ca 11.0, unclear etiology. Possible abdominal pain from hypercalcemia?   - PTH and vitamin D level in AM  - c/w IVF at 60cc/hr

## 2020-09-10 NOTE — H&P ADULT - NSHPSOCIALHISTORY_GEN_ALL_CORE
patient non-smoker, non-drinker, no drug use. Walks w/ walker. States last fall 3 months ago did not see medical care.

## 2020-09-10 NOTE — PROVIDER CONTACT NOTE (OTHER) - ASSESSMENT
pt denies any chest pain or sob. VS stable. pt ambulated in the hallway to expel some gas which helped. PRN malox given to pt.

## 2020-09-10 NOTE — H&P ADULT - PROBLEM SELECTOR PLAN 1
Patient complaining of epigastric pain for one week inhibiting eating. Endorsing 40lb weight loss over past month, endorses no appetite. Differential as stated above.   - GI, may benefit from repeat EGD   - troponin negative x 2 will not continue to trend, also w/ recent negative stress test   - anemia, sending B12, folate and iron studies   - tylenol as needed for pain  - PPI

## 2020-09-10 NOTE — H&P ADULT - HISTORY OF PRESENT ILLNESS
Patient is a 75 year old woman w/ past medical history of DM, DVT Eliquis presenting w/ one week of epigastric pain. Patient states pain radiates to her RUQ and LUQ, patient w/o lower abdominal pain. Patient denies radiation to shoulder or back. Pain is a squeezing pain. Patient denies any nausea, vomiting, constipation (last BM yesterday) or diarrhea. Patient states she lost 40lbs in the past month and often has poor appetite. Daughter tries to get her to eat more. Patient endorses associated abdominal bloating and gas. Patient states epigastric pain is a 10/10 severity. Patient states when she eats pain is worsened and when she takes her medications. Patient states her stool is dark, not black. Patient denies any hematochezia. Patient also endorses associated dizziness. Patient states room was spinning, currently not having these symptoms but was present on ED presentation. Patient denies associated headache, tinnitus, vision issues, or focal weakness w/ these symptoms. States she always has numbness in her hands and feet. Patient states when she stands up dizziness is exacerbated. Patient lives with her , daughter and grandsons all of whom are doing well health wise. Of note patient w/ recent negative nuclear stress test.   ED Course: afebrile, HR 52 asymptomatic, /60, R 18, S 100%.  Patient labs notable for hemoglobin 9.7, white count 10K, troponin 9->9, Ca 11.0. Lipase wnl. LFTs wnl. CT abdomen w/ calcified gall bladder. Patient given Maalox and Percocet which alleviated her symptoms. Patient admitted for epigastric pain.

## 2020-09-10 NOTE — ED PROVIDER NOTE - PROGRESS NOTE DETAILS
Dr. Ton Rashid, PGY-3: CT w/o acute pathology. Pt reports pain improved. Will attempt to PO challenge to aid in dispo decision. Hafsa Felipe, resident MD: pt was signed out to me. no acute pathology on CT a/p. pt was unable to tolerate PO and had persistent pain. will admit for PO support and pain management. PCP: Dr. Marion Matthews

## 2020-09-10 NOTE — PROVIDER CONTACT NOTE (OTHER) - ACTION/TREATMENT ORDERED:
provider will put in simethicone. continue to monitor. 12 LEAD EKG being done. provider will put in simethicone. continue to monitor.

## 2020-09-10 NOTE — CONSULT NOTE ADULT - SUBJECTIVE AND OBJECTIVE BOX
Chief Complaint:  Patient is a 75y old  Female who presents with a chief complaint of Epigastric Pain (10 Sep 2020 10:55)    HPI:PARIS MONTEZ is a 75y Female w/ hx of DMII, DVT on eliquis, presenting w/ 1 month of intermittent acute onset epigastric pain and gas sensation. She reports that she has intermittent sudden onset epigastric pressure, normally 45 minutes after eating. It is associated w/ SOB, and relieved by belching. This also occurs w/ exertion sometimes. She denies nausea, vomiting, dysphagia, diarrhea, or constipation. Denies melena or hematochezia. ROS positive for significant weight loss (40 lbs in less than 6 months) - pt has no appetite and only eats soup and fruit.     Pt was recently evaluated (1 month ago) for B12 deficiency and underwent EGD. She was found to have severe atrophic gastritis - biopsies otherwise neg for malignancy or H pylori. She has since been evaluated for her current symptoms multiple times since her EGD, and has had negative nuclear stress test and TTE. CT scans have been remarkable only for partially calcified gallbladder wall.     Pt reports improvement in her symptoms w/ simethicone.    PMHX/PSHX:  Pernicious anemia  Myocardial infarction  Psoriasis  Vitiligo  Leg swelling  DVT (deep venous thrombosis), right  Hypothyroid  Ventral hernia  Diabetes  Asthma  Essential hypertension  S/P hernia surgery  No significant past surgical history    Allergies:  Apple Juice (Rash)  Carrots (Rash)  No Known Drug Allergies  pork (Rash)    Home Medications: reviewed    Hospital Medications:  acetaminophen   Tablet .. 650 milliGRAM(s) Oral every 6 hours PRN  ALBUTerol    90 MICROgram(s) HFA Inhaler 2 Puff(s) Inhalation every 6 hours PRN  aluminum hydroxide/magnesium hydroxide/simethicone Suspension 30 milliLiter(s) Oral every 6 hours PRN  apixaban 5 milliGRAM(s) Oral every 12 hours  budesonide 160 MICROgram(s)/formoterol 4.5 MICROgram(s) Inhaler 2 Puff(s) Inhalation two times a day  dextrose 40% Gel 15 Gram(s) Oral once PRN  dextrose 5%. 1000 milliLiter(s) IV Continuous <Continuous>  dextrose 50% Injectable 12.5 Gram(s) IV Push once  dextrose 50% Injectable 25 Gram(s) IV Push once  dextrose 50% Injectable 25 Gram(s) IV Push once  ferrous    sulfate 325 milliGRAM(s) Oral daily  folic acid 1 milliGRAM(s) Oral daily  gabapentin 300 milliGRAM(s) Oral two times a day  glucagon  Injectable 1 milliGRAM(s) IntraMuscular once PRN  insulin lispro (HumaLOG) corrective regimen sliding scale   SubCutaneous three times a day before meals  levothyroxine 150 MICROGram(s) Oral daily  loratadine 10 milliGRAM(s) Oral daily  NIFEdipine XL 30 milliGRAM(s) Oral daily  pantoprazole    Tablet 40 milliGRAM(s) Oral before breakfast  simvastatin 20 milliGRAM(s) Oral at bedtime  sodium chloride 0.9%. 1000 milliLiter(s) IV Continuous <Continuous>  triamcinolone 0.1% Cream 1 Application(s) Topical two times a day    Social History:   Tob: Denies  EtOH: Denies  Illicit Drugs: Denies    Family history:  FH: diabetes mellitus    Denies family history of colon cancer/polyps, stomach cancer/polyps, pancreatic cancer/masses, liver cancer/disease, ovarian cancer and endometrial cancer.    ROS:   General:  No  fevers, chills, night sweats, fatigue  Eyes:  Good vision, no reported pain  ENT:  No sore throat, pain, runny nose  CV:  No pain, palpitations  Pulm:  No dyspnea, cough  GI:  See HPI, otherwise negative  :  No  incontinence, nocturia  Muscle:  No pain, weakness  Neuro:  No memory problems  Psych:  No insomnia, mood problems, depression  Endocrine:  No polyuria, polydipsia, cold/heat intolerance  Heme:  No petechiae, ecchymosis, easy bruisability  Skin:  No rash    PHYSICAL EXAM:   Vital Signs:  Vital Signs Last 24 Hrs  T(C): 36.6 (10 Sep 2020 10:10), Max: 37.1 (10 Sep 2020 08:30)  T(F): 97.8 (10 Sep 2020 10:10), Max: 98.7 (10 Sep 2020 08:30)  HR: 100 (10 Sep 2020 15:47) (52 - 100)  BP: 151/86 (10 Sep 2020 15:47) (124/93 - 151/86)  BP(mean): --  RR: 19 (10 Sep 2020 15:47) (16 - 19)  SpO2: 99% (10 Sep 2020 15:47) (99% - 100%)  Daily Height in cm: 157.48 (10 Sep 2020 02:06)    Daily     GENERAL: no acute distress  NEURO: alert, no asterixis  HEENT: anicteric sclera, no conjunctival pallor appreciated  CHEST: no respiratory distress, no accessory muscle use  CARDIAC: regular rate, rhythm  ABDOMEN: soft, non-tender, non-distended, no rebound or guarding  EXTREMITIES: warm, well perfused, no edema  SKIN: no lesions noted    LABS: reviewed                        9.7    10.90 )-----------( 315      ( 10 Sep 2020 03:20 )             31.0     09-10    139  |  104  |  19  ----------------------------<  90  4.1   |  23  |  1.11    Ca    11.0<H>      10 Sep 2020 03:20  Phos  2.6     09-10  Mg     1.9     09-10    TPro  7.8  /  Alb  4.3  /  TBili  0.5  /  DBili  x   /  AST  14  /  ALT  8   /  AlkPhos  68  09-10    LIVER FUNCTIONS - ( 10 Sep 2020 03:20 )  Alb: 4.3 g/dL / Pro: 7.8 g/dL / ALK PHOS: 68 u/L / ALT: 8 u/L / AST: 14 u/L / GGT: x               Diagnostic Studies: see sunrise for full report

## 2020-09-10 NOTE — CHART NOTE - NSCHARTNOTEFT_GEN_A_CORE
Called by RN to evaluate patient for abdomina discomfort and R IV line infiltration from IV site.  IV site discontinued, site without tenderness noted.  R +2 pulses palpitable, continue to monitor site.  Patient endorsing abdominal discomfort and increase flatulence, denies CP, palpitations, SOB, n/v/d. Vitals stable.  Patient given maalox and simethicone.  At this time, patient is requesting to walk around as her symptoms are alleviated with movement.  EKG to be obtained when patient consents. Patient verbalized understanding need for EKG to r/o ACS. Daughter updated. Called by RN to evaluate patient for abdomina discomfort and R IV line infiltration from IV site.  IV site discontinued, site without tenderness noted.  R +2 pulses palpitable,, continue to monitor site.  Patient endorsing abdominal discomfort and increase flatulence, denies CP, palpitations, SOB, n/v/d. Vitals stable.  Patient given maalox and simethicone.  At this time, patient is requesting to walk around as her symptoms are alleviated with movement.  Vitals: /86, , RR 19. 02 sat 99%.EKG reviewed, no ischemic changes. Continue to monitor.

## 2020-09-11 ENCOUNTER — TRANSCRIPTION ENCOUNTER (OUTPATIENT)
Age: 75
End: 2020-09-11

## 2020-09-11 LAB
24R-OH-CALCIDIOL SERPL-MCNC: 21.1 NG/ML — LOW (ref 30–80)
ALBUMIN SERPL ELPH-MCNC: 3.7 G/DL — SIGNIFICANT CHANGE UP (ref 3.3–5)
ALP SERPL-CCNC: 64 U/L — SIGNIFICANT CHANGE UP (ref 40–120)
ALT FLD-CCNC: 9 U/L — SIGNIFICANT CHANGE UP (ref 4–33)
ANION GAP SERPL CALC-SCNC: 10 MMO/L — SIGNIFICANT CHANGE UP (ref 7–14)
ANISOCYTOSIS BLD QL: SIGNIFICANT CHANGE UP
AST SERPL-CCNC: 11 U/L — SIGNIFICANT CHANGE UP (ref 4–32)
BASOPHILS # BLD AUTO: 0.09 K/UL — SIGNIFICANT CHANGE UP (ref 0–0.2)
BASOPHILS NFR BLD AUTO: 1.1 % — SIGNIFICANT CHANGE UP (ref 0–2)
BILIRUB SERPL-MCNC: 0.5 MG/DL — SIGNIFICANT CHANGE UP (ref 0.2–1.2)
BLD GP AB SCN SERPL QL: NEGATIVE — SIGNIFICANT CHANGE UP
BUN SERPL-MCNC: 11 MG/DL — SIGNIFICANT CHANGE UP (ref 7–23)
CALCIUM SERPL-MCNC: 10.1 MG/DL — SIGNIFICANT CHANGE UP (ref 8.4–10.5)
CHLORIDE SERPL-SCNC: 108 MMOL/L — HIGH (ref 98–107)
CO2 SERPL-SCNC: 24 MMOL/L — SIGNIFICANT CHANGE UP (ref 22–31)
CREAT SERPL-MCNC: 0.84 MG/DL — SIGNIFICANT CHANGE UP (ref 0.5–1.3)
EOSINOPHIL # BLD AUTO: 0.63 K/UL — HIGH (ref 0–0.5)
EOSINOPHIL NFR BLD AUTO: 7.7 % — HIGH (ref 0–6)
FERRITIN SERPL-MCNC: 26.45 NG/ML — SIGNIFICANT CHANGE UP (ref 15–150)
FOLATE SERPL-MCNC: > 20 NG/ML — HIGH (ref 4.7–20)
GLUCOSE BLDC GLUCOMTR-MCNC: 101 MG/DL — HIGH (ref 70–99)
GLUCOSE BLDC GLUCOMTR-MCNC: 72 MG/DL — SIGNIFICANT CHANGE UP (ref 70–99)
GLUCOSE BLDC GLUCOMTR-MCNC: 78 MG/DL — SIGNIFICANT CHANGE UP (ref 70–99)
GLUCOSE SERPL-MCNC: 91 MG/DL — SIGNIFICANT CHANGE UP (ref 70–99)
HBA1C BLD-MCNC: 4.5 % — SIGNIFICANT CHANGE UP (ref 4–5.6)
HCT VFR BLD CALC: 29.6 % — LOW (ref 34.5–45)
HGB BLD-MCNC: 9.1 G/DL — LOW (ref 11.5–15.5)
IMM GRANULOCYTES NFR BLD AUTO: 0.4 % — SIGNIFICANT CHANGE UP (ref 0–1.5)
IRON SATN MFR SERPL: 32 UG/DL — SIGNIFICANT CHANGE UP (ref 30–160)
IRON SATN MFR SERPL: 322 UG/DL — SIGNIFICANT CHANGE UP (ref 140–530)
LYMPHOCYTES # BLD AUTO: 1.67 K/UL — SIGNIFICANT CHANGE UP (ref 1–3.3)
LYMPHOCYTES # BLD AUTO: 20.4 % — SIGNIFICANT CHANGE UP (ref 13–44)
MACROCYTES BLD QL: SIGNIFICANT CHANGE UP
MAGNESIUM SERPL-MCNC: 1.9 MG/DL — SIGNIFICANT CHANGE UP (ref 1.6–2.6)
MANUAL SMEAR VERIFICATION: SIGNIFICANT CHANGE UP
MCHC RBC-ENTMCNC: 30.7 % — LOW (ref 32–36)
MCHC RBC-ENTMCNC: 32.3 PG — SIGNIFICANT CHANGE UP (ref 27–34)
MCV RBC AUTO: 105 FL — HIGH (ref 80–100)
MONOCYTES # BLD AUTO: 0.51 K/UL — SIGNIFICANT CHANGE UP (ref 0–0.9)
MONOCYTES NFR BLD AUTO: 6.2 % — SIGNIFICANT CHANGE UP (ref 2–14)
NEUTROPHILS # BLD AUTO: 5.26 K/UL — SIGNIFICANT CHANGE UP (ref 1.8–7.4)
NEUTROPHILS NFR BLD AUTO: 64.2 % — SIGNIFICANT CHANGE UP (ref 43–77)
NRBC # FLD: 0 K/UL — SIGNIFICANT CHANGE UP (ref 0–0)
PHOSPHATE SERPL-MCNC: 2.6 MG/DL — SIGNIFICANT CHANGE UP (ref 2.5–4.5)
PLATELET # BLD AUTO: 201 K/UL — SIGNIFICANT CHANGE UP (ref 150–400)
PLATELET CLUMP BLD QL SMEAR: SLIGHT — SIGNIFICANT CHANGE UP
PLATELET COUNT - ESTIMATE: NORMAL — SIGNIFICANT CHANGE UP
PMV BLD: 12 FL — SIGNIFICANT CHANGE UP (ref 7–13)
POTASSIUM SERPL-MCNC: 4.7 MMOL/L — SIGNIFICANT CHANGE UP (ref 3.5–5.3)
POTASSIUM SERPL-SCNC: 4.7 MMOL/L — SIGNIFICANT CHANGE UP (ref 3.5–5.3)
PROT SERPL-MCNC: 6.4 G/DL — SIGNIFICANT CHANGE UP (ref 6–8.3)
PTH-INTACT SERPL-MCNC: 106.7 PG/ML — HIGH (ref 15–65)
RBC # BLD: 2.82 M/UL — LOW (ref 3.8–5.2)
RBC # FLD: 19.5 % — HIGH (ref 10.3–14.5)
REVIEW TO FOLLOW: YES — SIGNIFICANT CHANGE UP
RH IG SCN BLD-IMP: POSITIVE — SIGNIFICANT CHANGE UP
SODIUM SERPL-SCNC: 142 MMOL/L — SIGNIFICANT CHANGE UP (ref 135–145)
TRANSFERRIN SERPL-MCNC: 271 MG/DL — SIGNIFICANT CHANGE UP (ref 200–360)
UIBC SERPL-MCNC: 289.9 UG/DL — SIGNIFICANT CHANGE UP (ref 110–370)
VIT B12 SERPL-MCNC: 1513 PG/ML — HIGH (ref 200–900)
WBC # BLD: 8.19 K/UL — SIGNIFICANT CHANGE UP (ref 3.8–10.5)
WBC # FLD AUTO: 8.19 K/UL — SIGNIFICANT CHANGE UP (ref 3.8–10.5)

## 2020-09-11 PROCEDURE — 99232 SBSQ HOSP IP/OBS MODERATE 35: CPT | Mod: GC

## 2020-09-11 PROCEDURE — 71250 CT THORAX DX C-: CPT | Mod: 26

## 2020-09-11 PROCEDURE — 99233 SBSQ HOSP IP/OBS HIGH 50: CPT

## 2020-09-11 RX ADMIN — PANTOPRAZOLE SODIUM 40 MILLIGRAM(S): 20 TABLET, DELAYED RELEASE ORAL at 05:27

## 2020-09-11 RX ADMIN — SIMVASTATIN 20 MILLIGRAM(S): 20 TABLET, FILM COATED ORAL at 21:06

## 2020-09-11 RX ADMIN — APIXABAN 5 MILLIGRAM(S): 2.5 TABLET, FILM COATED ORAL at 17:35

## 2020-09-11 RX ADMIN — Medication 30 MILLIGRAM(S): at 05:28

## 2020-09-11 RX ADMIN — GABAPENTIN 300 MILLIGRAM(S): 400 CAPSULE ORAL at 17:35

## 2020-09-11 RX ADMIN — Medication 1 APPLICATION(S): at 05:29

## 2020-09-11 RX ADMIN — BUDESONIDE AND FORMOTEROL FUMARATE DIHYDRATE 2 PUFF(S): 160; 4.5 AEROSOL RESPIRATORY (INHALATION) at 21:06

## 2020-09-11 RX ADMIN — Medication 1 MILLIGRAM(S): at 11:50

## 2020-09-11 RX ADMIN — LORATADINE 10 MILLIGRAM(S): 10 TABLET ORAL at 11:50

## 2020-09-11 RX ADMIN — BUDESONIDE AND FORMOTEROL FUMARATE DIHYDRATE 2 PUFF(S): 160; 4.5 AEROSOL RESPIRATORY (INHALATION) at 09:34

## 2020-09-11 RX ADMIN — SODIUM CHLORIDE 70 MILLILITER(S): 9 INJECTION INTRAMUSCULAR; INTRAVENOUS; SUBCUTANEOUS at 11:53

## 2020-09-11 RX ADMIN — Medication 325 MILLIGRAM(S): at 11:50

## 2020-09-11 RX ADMIN — GABAPENTIN 300 MILLIGRAM(S): 400 CAPSULE ORAL at 05:27

## 2020-09-11 RX ADMIN — Medication 1 APPLICATION(S): at 17:35

## 2020-09-11 RX ADMIN — Medication 150 MICROGRAM(S): at 05:28

## 2020-09-11 RX ADMIN — Medication 30 MILLILITER(S): at 11:53

## 2020-09-11 RX ADMIN — APIXABAN 5 MILLIGRAM(S): 2.5 TABLET, FILM COATED ORAL at 05:27

## 2020-09-11 NOTE — DIETITIAN INITIAL EVALUATION ADULT. - OTHER INFO
76 y/o F with PMH DM2 p/w 1 week of epigastric pain and abdominal bloating. Pt states she was only able to eat minimally due to feeling "gas trapped" [inside her abdomen]. She was eating mostly soups at home and reported poor PO for past month. Pt states symptoms have improved with maalox today. She is tolerating full liquid diet without any c/o abdominal bloating, nausea, constipation and diarrhea. Denies any chewing or swallowing difficulties at this time.     Pt reported 40 pound wt loss; however, accuracy questionable.  pounds (75 kg) ; I weighed pt on zeroed-out bed 9/11 @ 154 pounds.  Previous wts per chart: (8/9) 73.5 kg; (8/20) 62.8 kg

## 2020-09-11 NOTE — PROGRESS NOTE ADULT - ASSESSMENT
75F w/ DMII, DVT on eliquis, atrophic gastritis presenting w/ long-standing intermittent epigastric pain + gas distension. Normal labs and imaging are reassuring. Pt's symptoms improve w/ simethicone.     Impression:  #Abd pain/gasseous distension - unclear etiology, recent EGD r/o dangerous pathology i.e. gastric outlet obstruction or gastric cancer. H pylori neg, no e/o PUD. Neg lipase and normal imaging r/o pancreatitis. Not suspicious for ischemic etiology. Low suspicion for biliary etiology, but unable to properly visualize gallbladder on RUQ U/S  #Weight loss - nothing on EGD or CT A/P explains significant weight loss  #Atrophic gastritis    Recommendations:  - c/w simethicone q6h  - add maalox PRN  - can try dicyclomine 20mg q12h while inpatient to see if there is any response  - would get CT chest to r/o malignancy causing weight loss  - pt needs to come to outpatient follow up in [facility]ology Clinic [phone] ([facility] at [address] [place] [address]); we will email to arrange follow up, please put correct info in discharge summary    Clif Soto  Gastroenterology Fellow  NON-URGENT CONSULTS:  Please email [email or web address]  [email or web address]  AT NIGHT AND ON WEEKENDS:  Contact on-call GI fellow via answering service ([phone]) from 5pm-8am and on weekends/holidays  MONDAY-FRIDAY 8AM-5PM:  Available via [employer] Teams  Pager# [phone] ([facility]) or [phone] ([facility])  GI Phone# [phone] ([facility]) 75F w/ DMII, DVT on eliquis, atrophic gastritis presenting w/ long-standing intermittent epigastric pain + gas distension. Normal labs and imaging are reassuring. Pt's symptoms improve w/ simethicone.     Impression:  #Abd pain/gasseous distension - unclear etiology, recent EGD r/o dangerous pathology i.e. gastric outlet obstruction or gastric cancer. H pylori neg, no e/o PUD. Neg lipase and normal imaging r/o pancreatitis. Not suspicious for ischemic etiology. Low suspicion for biliary etiology, but unable to properly visualize gallbladder on RUQ U/S  #Weight loss - nothing on EGD or CT A/P explains significant weight loss  #Atrophic gastritis    Recommendations:  - c/w simethicone q6h  - add maalox PRN  - can try dicyclomine 20mg q12h while inpatient to see if there is any response if no contraindication  - would get CT chest to r/o malignancy causing weight loss  - pt needs to come to outpatient follow up in Gastroenterology Clinic [phone] ([facility] at [address] [place] [address]); we will email to arrange follow up, please put correct info in discharge summary    Clif Soto  Gastroenterology Fellow  NON-URGENT CONSULTS:  Please email [email or web address]  AT NIGHT AND ON WEEKENDS:  Contact on-call GI fellow via answering service ([phone]) from 5pm-8am and on weekends/holidays  MONDAY-FRIDAY 8AM-5PM:  Available via [employer] Teams  Pager# [phone] ([facility]) or [phone] ([facility])  GI Phone# [phone] ([facility])

## 2020-09-11 NOTE — DIETITIAN INITIAL EVALUATION ADULT. - ADD RECOMMEND
1. small frequent meals. 2. Pt told to pay attn if certain foods (high fiber/high fat) exacerbate issues of bloating/epigastric pain.

## 2020-09-11 NOTE — PROGRESS NOTE ADULT - PROBLEM SELECTOR PLAN 8
Patient w/ юлия prior DVT on eliquis   - c/w eliquis monitor stools for bleeding   - eliquis 5mg BID.

## 2020-09-11 NOTE — PROGRESS NOTE ADULT - ASSESSMENT
Patient is a 75 year old woman w/ past medical history of DM, DVT Eliquis presenting w/ one week of epigastric pain. Patient states pain radiates to her RUQ and LUQ, patient w/o lower abdominal pain. CT abdomen and US abdomen w/o acute findings, calcification of gall bladder. Patient reports 40lb weight loss over a one month time span due to inability to tolerate PO. Admitted for epigastric pain of unclear etiology differential includes, PUD vs GERD vs gastritis vs ACS (unlikely troponin negative x 2 w/ recent normal stress test) vs biliary pathology vs gastroparesis now improved no plan for repeat EGD

## 2020-09-11 NOTE — PHYSICAL THERAPY INITIAL EVALUATION ADULT - ADDITIONAL COMMENTS
Pt. reports owning a rolling walker.     Pt. was left semisupine in bed post PT Evaluation, no apparent distress, all lines intact, call bell within reach.

## 2020-09-11 NOTE — DIETITIAN INITIAL EVALUATION ADULT. - PERTINENT LABORATORY DATA
09-11 Na 142 mmol/L Glu 91 mg/dL K+ 4.7 mmol/L Cr 0.84 mg/dL BUN 11 mg/dL Phos 2.6 mg/dL  09-11 @ 13:04 POCT 78 mg/dL  09-11 @ 08:23 POCT 71 mg/dL  09-11 @ 08:22 POCT 69 mg/dL  09-11 @ 08:21 POCT 69 mg/dL  09-10 @ 22:12 POCT 121 mg/dL  09-10 @ 16:56 POCT 89 mg/dL

## 2020-09-11 NOTE — DISCHARGE NOTE PROVIDER - NSDCCPCAREPLAN_GEN_ALL_CORE_FT
PRINCIPAL DISCHARGE DIAGNOSIS  Diagnosis: Epigastric pain  Assessment and Plan of Treatment: Improved, tolerating diet. Continue with Protonix, Bentyl, Maalox as needed. Follow up outpatient Gastroenterology clinic at [phone] ([facility]) in 1-2 weeks for further management.      SECONDARY DISCHARGE DIAGNOSES  Diagnosis: Pulmonary nodule  Assessment and Plan of Treatment: CT chest showed bilateral centrilobular nodules and patchy opacities are of uncertain etiology. Follow-up outpatient PCP and/or Pulmonary clinic in 4-6 weeks with repeat imaging to evaluate for resolution/change.    Diagnosis: Hypothyroid  Assessment and Plan of Treatment: Continue your thyroid medications as recommended and follow-up with your outpatient provider for continual thyroid function testing and further medication management.      Diagnosis: Essential hypertension  Assessment and Plan of Treatment: Continue blood pressure medication regimen as directed. Monitor for any visual changes, headaches or dizziness.  Monitor blood pressure regularly.  Follow up with your PCP for further management for high blood pressure.      Diagnosis: Diabetes  Assessment and Plan of Treatment: Continue consistent carbohydrate diet.  Monitor blood glucose levels throughout the day before meals and at bedtime.  Record blood sugars and bring to outpatient providers appointment in order to be reviewed by your doctor for management modifications.  Be aware of diabetes management symptoms including feeling cool and clammy may be related to low glucose levels.  Feeling hot and dry may indicate high glucose levels. If you feel these symptoms, check your blood sugar.  Make regular podiatry appointments in order to have feet checked for wounds and toe nails cut by a doctor to prevent infections, as well as, appointments with an ophthalmologist to monitor your vision.      Diagnosis: Vitiligo  Assessment and Plan of Treatment: Stable. Follow up outpatient PCP in 1-2 weeks for further management.    Diagnosis: Psoriasis  Assessment and Plan of Treatment: Continue with cream as recommended. Follow up outpatient PCP in 1-2 weeks for further management.    Diagnosis: Dizziness  Assessment and Plan of Treatment: Resolved. Workup was unremarkable for etiology. Continue to encourage oral intake/hydration. Follow up outpatient PCP in 1-2 weeks for further management.    Diagnosis: DVT (deep venous thrombosis)  Assessment and Plan of Treatment: Continue Eliquis as per home regimen. Follow up outpatient PCP in 1-2 weeks for further management.     PRINCIPAL DISCHARGE DIAGNOSIS  Diagnosis: Epigastric pain  Assessment and Plan of Treatment: Improved, tolerating diet. Continue with Protonix, Bentyl, Maalox as needed. Follow up outpatient Gastroenterology clinic at [phone] ([address] [facility]) in 1-2 weeks for further management.      SECONDARY DISCHARGE DIAGNOSES  Diagnosis: Pulmonary nodule  Assessment and Plan of Treatment: CT chest showed bilateral centrilobular nodules and patchy opacities are of uncertain etiology. Follow-up outpatient PCP and/or Pulmonary clinic in 4-6 weeks with repeat imaging to evaluate for resolution/change.    Diagnosis: Hypothyroid  Assessment and Plan of Treatment: Continue your thyroid medications as recommended and follow-up with your outpatient provider for continual thyroid function testing and further medication management.      Diagnosis: Essential hypertension  Assessment and Plan of Treatment: Continue blood pressure medication regimen as directed. Monitor for any visual changes, headaches or dizziness.  Monitor blood pressure regularly.  Follow up with your PCP for further management for high blood pressure.      Diagnosis: Diabetes  Assessment and Plan of Treatment: A1c 4.5. No need for Metformin. Be aware of symptoms including feeling cool and clammy may be related to low glucose levels.  Feeling hot and dry may indicate high glucose levels. If you feel these symptoms, check your blood sugar.  Follow up outpatient PCP in 1-2 weeks for further management.    Diagnosis: Vitiligo  Assessment and Plan of Treatment: Stable. Follow up outpatient PCP in 1-2 weeks for further management.    Diagnosis: Psoriasis  Assessment and Plan of Treatment: Continue with cream as recommended. Follow up outpatient PCP in 1-2 weeks for further management.    Diagnosis: Dizziness  Assessment and Plan of Treatment: Resolved. Workup was unremarkable for etiology. Continue to encourage oral intake/hydration. Follow up outpatient PCP in 1-2 weeks for further management.    Diagnosis: DVT (deep venous thrombosis)  Assessment and Plan of Treatment: Continue Eliquis as per home regimen. Follow up outpatient PCP in 1-2 weeks for further management.

## 2020-09-11 NOTE — PROGRESS NOTE ADULT - SUBJECTIVE AND OBJECTIVE BOX
Chief Complaint:  Patient is a 75y old  Female who presents with a chief complaint of Epigastric Pain (10 Sep 2020 17:06)    Reason for consult: abd pain/bloating    Interval Events: Pt had significant improvement w/ Maalox. Tolerated diet this AM w/o issue.     Hospital Medications:  acetaminophen   Tablet .. 650 milliGRAM(s) Oral every 6 hours PRN  ALBUTerol    90 MICROgram(s) HFA Inhaler 2 Puff(s) Inhalation every 6 hours PRN  aluminum hydroxide/magnesium hydroxide/simethicone Suspension 30 milliLiter(s) Oral every 6 hours PRN  apixaban 5 milliGRAM(s) Oral every 12 hours  budesonide 160 MICROgram(s)/formoterol 4.5 MICROgram(s) Inhaler 2 Puff(s) Inhalation two times a day  dextrose 40% Gel 15 Gram(s) Oral once PRN  dextrose 5%. 1000 milliLiter(s) IV Continuous <Continuous>  dextrose 50% Injectable 12.5 Gram(s) IV Push once  dextrose 50% Injectable 25 Gram(s) IV Push once  dextrose 50% Injectable 25 Gram(s) IV Push once  dicyclomine 20 milliGRAM(s) Oral two times a day before meals  ferrous    sulfate 325 milliGRAM(s) Oral daily  folic acid 1 milliGRAM(s) Oral daily  gabapentin 300 milliGRAM(s) Oral two times a day  glucagon  Injectable 1 milliGRAM(s) IntraMuscular once PRN  influenza   Vaccine 0.5 milliLiter(s) IntraMuscular once  insulin lispro (HumaLOG) corrective regimen sliding scale   SubCutaneous three times a day before meals  levothyroxine 150 MICROGram(s) Oral daily  loratadine 10 milliGRAM(s) Oral daily  NIFEdipine XL 30 milliGRAM(s) Oral daily  pantoprazole    Tablet 40 milliGRAM(s) Oral before breakfast  simethicone 80 milliGRAM(s) Chew every 6 hours PRN  simvastatin 20 milliGRAM(s) Oral at bedtime  sodium chloride 0.9%. 1000 milliLiter(s) IV Continuous <Continuous>  triamcinolone 0.1% Cream 1 Application(s) Topical two times a day    ROS:   General:  No  fevers, chills, night sweats, fatigue  Eyes:  Good vision, no reported pain  ENT:  No sore throat, pain, runny nose  CV:  No pain, palpitations  Pulm:  No dyspnea, cough  GI:  See HPI, otherwise negative  :  No  incontinence, nocturia  Muscle:  No pain, weakness  Neuro:  No memory problems  Psych:  No insomnia, mood problems, depression  Endocrine:  No polyuria, polydipsia, cold/heat intolerance  Heme:  No petechiae, ecchymosis, easy bruisability  Skin:  No rash    PHYSICAL EXAM:   Vital Signs:  Vital Signs Last 24 Hrs  T(C): 36.7 (11 Sep 2020 05:24), Max: 36.8 (10 Sep 2020 20:57)  T(F): 98.1 (11 Sep 2020 05:24), Max: 98.3 (10 Sep 2020 20:57)  HR: 64 (11 Sep 2020 05:24) (53 - 100)  BP: 125/57 (11 Sep 2020 05:24) (125/57 - 151/86)  BP(mean): --  RR: 18 (11 Sep 2020 05:24) (18 - 19)  SpO2: 99% (11 Sep 2020 05:24) (99% - 100%)  Daily     Daily     GENERAL: no acute distress  NEURO: alert, no asterixis  HEENT: anicteric sclera, no conjunctival pallor appreciated  CHEST: no respiratory distress, no accessory muscle use  CARDIAC: regular rate, rhythm  ABDOMEN: soft, non-tender, non-distended, no rebound or guarding  EXTREMITIES: warm, well perfused, no edema  SKIN: no lesions noted    LABS: reviewed                        9.1    8.19  )-----------( 201      ( 11 Sep 2020 07:30 )             29.6     09-11    142  |  108<H>  |  11  ----------------------------<  91  4.7   |  24  |  0.84    Ca    10.1      11 Sep 2020 07:30  Phos  2.6     09-11  Mg     1.9     09-11    TPro  6.4  /  Alb  3.7  /  TBili  0.5  /  DBili  x   /  AST  11  /  ALT  9   /  AlkPhos  64  09-11    LIVER FUNCTIONS - ( 11 Sep 2020 07:30 )  Alb: 3.7 g/dL / Pro: 6.4 g/dL / ALK PHOS: 64 u/L / ALT: 9 u/L / AST: 11 u/L / GGT: x             Interval Diagnostic Studies: see sunrise for full report

## 2020-09-11 NOTE — DISCHARGE NOTE PROVIDER - NSDCMRMEDTOKEN_GEN_ALL_CORE_FT
Advair Diskus 500 mcg-50 mcg inhalation powder: 1 puff(s) inhaled 2 times a day  albuterol 90 mcg/inh inhalation aerosol: 2 puff(s) inhaled every 6 hours, As needed, SOB  apixaban 5 mg oral tablet: 1 tab(s) orally every 12 hours  cyanocobalamin 1000 mcg/mL injectable solution: 1 milliliter(s) injectable once a week   ferrous sulfate 325 mg (65 mg elemental iron) oral tablet: 1 tab(s) orally once a day  folic acid 1 mg oral tablet: 1 tab(s) orally once a day  gabapentin 300 mg oral capsule: 1 cap(s) orally 2 times a day  levothyroxine 150 mcg (0.15 mg) oral tablet: 1 tab(s) orally once a day  metFORMIN 500 mg oral tablet: 1 tab(s) orally once a day  NIFEdipine 30 mg oral tablet, extended release: 1 tab(s) orally once a day  simvastatin 20 mg oral tablet: 1 tab(s) orally once a day (at bedtime) Advair Diskus 500 mcg-50 mcg inhalation powder: 1 puff(s) inhaled 2 times a day  albuterol 90 mcg/inh inhalation aerosol: 2 puff(s) inhaled every 6 hours, As needed, SOB  aluminum hydroxide-magnesium hydroxide 200 mg-200 mg/5 mL oral suspension: 30 milliliter(s) orally every 6 hours, As needed, Dyspepsia  apixaban 5 mg oral tablet: 1 tab(s) orally every 12 hours  cyanocobalamin 1000 mcg/mL injectable solution: 1 milliliter(s) injectable once a week   dicyclomine 20 mg oral tablet: 1 tab(s) orally 2 times a day (before meals)  ferrous sulfate 325 mg (65 mg elemental iron) oral tablet: 1 tab(s) orally once a day  folic acid 1 mg oral tablet: 1 tab(s) orally once a day  gabapentin 300 mg oral capsule: 1 cap(s) orally 2 times a day  levothyroxine 150 mcg (0.15 mg) oral tablet: 1 tab(s) orally once a day  metFORMIN 500 mg oral tablet: 1 tab(s) orally once a day  NIFEdipine 30 mg oral tablet, extended release: 1 tab(s) orally once a day  simvastatin 20 mg oral tablet: 1 tab(s) orally once a day (at bedtime)  triamcinolone 0.1% topical cream: 1 application topically 2 times a day   Advair Diskus 500 mcg-50 mcg inhalation powder: 1 puff(s) inhaled 2 times a day  albuterol 90 mcg/inh inhalation aerosol: 2 puff(s) inhaled every 6 hours, As needed, SOB  aluminum hydroxide-magnesium hydroxide 200 mg-200 mg/5 mL oral suspension: 30 milliliter(s) orally every 6 hours, As needed, Dyspepsia  apixaban 5 mg oral tablet: 1 tab(s) orally every 12 hours  cyanocobalamin 1000 mcg/mL injectable solution: 1 milliliter(s) injectable once a week   dicyclomine 20 mg oral tablet: 1 tab(s) orally 2 times a day (before meals)  ferrous sulfate 325 mg (65 mg elemental iron) oral tablet: 1 tab(s) orally once a day  folic acid 1 mg oral tablet: 1 tab(s) orally once a day  gabapentin 300 mg oral capsule: 1 cap(s) orally 2 times a day  levothyroxine 150 mcg (0.15 mg) oral tablet: 1 tab(s) orally once a day  metFORMIN 500 mg oral tablet: 1 tab(s) orally once a day  NIFEdipine 30 mg oral tablet, extended release: 1 tab(s) orally once a day  pantoprazole 40 mg oral delayed release tablet: 1 tab(s) orally once a day (before a meal)  simvastatin 20 mg oral tablet: 1 tab(s) orally once a day (at bedtime)  triamcinolone 0.1% topical cream: 1 application topically 2 times a day   Advair Diskus 500 mcg-50 mcg inhalation powder: 1 puff(s) inhaled 2 times a day  albuterol 90 mcg/inh inhalation aerosol: 2 puff(s) inhaled every 6 hours, As needed, SOB  aluminum hydroxide-magnesium hydroxide 200 mg-200 mg/5 mL oral suspension: 30 milliliter(s) orally every 6 hours, As needed, Dyspepsia  apixaban 5 mg oral tablet: 1 tab(s) orally every 12 hours  cyanocobalamin 1000 mcg/mL injectable solution: 1 milliliter(s) injectable once a week   dicyclomine 20 mg oral tablet: 1 tab(s) orally 2 times a day (before meals)  ferrous sulfate 325 mg (65 mg elemental iron) oral tablet: 1 tab(s) orally once a day  folic acid 1 mg oral tablet: 1 tab(s) orally once a day  gabapentin 300 mg oral capsule: 1 cap(s) orally 2 times a day  levothyroxine 150 mcg (0.15 mg) oral tablet: 1 tab(s) orally once a day  NIFEdipine 30 mg oral tablet, extended release: 1 tab(s) orally once a day  pantoprazole 40 mg oral delayed release tablet: 1 tab(s) orally once a day (before a meal)  simvastatin 20 mg oral tablet: 1 tab(s) orally once a day (at bedtime)  triamcinolone 0.1% topical cream: 1 application topically 2 times a day

## 2020-09-11 NOTE — PROGRESS NOTE ADULT - SUBJECTIVE AND OBJECTIVE BOX
Patient is a 75y old  Female who presents with a chief complaint of Epigastric Pain (11 Sep 2020 12:00)      SUBJECTIVE / OVERNIGHT EVENTS:    Pt seen and examined at bedside, reports improvement in abdominal pain w/ maalox. Having regular bowel movements. Advance diet as tolerated.     MEDICATIONS  (STANDING):  apixaban 5 milliGRAM(s) Oral every 12 hours  budesonide 160 MICROgram(s)/formoterol 4.5 MICROgram(s) Inhaler 2 Puff(s) Inhalation two times a day  dextrose 5%. 1000 milliLiter(s) (50 mL/Hr) IV Continuous <Continuous>  dextrose 50% Injectable 12.5 Gram(s) IV Push once  dextrose 50% Injectable 25 Gram(s) IV Push once  dextrose 50% Injectable 25 Gram(s) IV Push once  dicyclomine 20 milliGRAM(s) Oral two times a day before meals  ferrous    sulfate 325 milliGRAM(s) Oral daily  folic acid 1 milliGRAM(s) Oral daily  gabapentin 300 milliGRAM(s) Oral two times a day  influenza   Vaccine 0.5 milliLiter(s) IntraMuscular once  insulin lispro (HumaLOG) corrective regimen sliding scale   SubCutaneous three times a day before meals  levothyroxine 150 MICROGram(s) Oral daily  loratadine 10 milliGRAM(s) Oral daily  NIFEdipine XL 30 milliGRAM(s) Oral daily  pantoprazole    Tablet 40 milliGRAM(s) Oral before breakfast  simvastatin 20 milliGRAM(s) Oral at bedtime  sodium chloride 0.9%. 1000 milliLiter(s) (70 mL/Hr) IV Continuous <Continuous>  triamcinolone 0.1% Cream 1 Application(s) Topical two times a day    MEDICATIONS  (PRN):  acetaminophen   Tablet .. 650 milliGRAM(s) Oral every 6 hours PRN Temp greater or equal to 38C (100.4F), Moderate Pain (4 - 6)  ALBUTerol    90 MICROgram(s) HFA Inhaler 2 Puff(s) Inhalation every 6 hours PRN SOB  aluminum hydroxide/magnesium hydroxide/simethicone Suspension 30 milliLiter(s) Oral every 6 hours PRN Dyspepsia  dextrose 40% Gel 15 Gram(s) Oral once PRN Blood Glucose LESS THAN 70 milliGRAM(s)/deciliter  glucagon  Injectable 1 milliGRAM(s) IntraMuscular once PRN Glucose LESS THAN 70 milligrams/deciliter  simethicone 80 milliGRAM(s) Chew every 6 hours PRN Gas      Vital Signs Last 24 Hrs  T(C): 36.7 (11 Sep 2020 05:24), Max: 36.8 (10 Sep 2020 20:57)  T(F): 98.1 (11 Sep 2020 05:24), Max: 98.3 (10 Sep 2020 20:57)  HR: 64 (11 Sep 2020 05:24) (53 - 100)  BP: 125/57 (11 Sep 2020 05:24) (125/57 - 151/86)  BP(mean): --  RR: 18 (11 Sep 2020 05:24) (18 - 19)  SpO2: 99% (11 Sep 2020 05:24) (99% - 100%)  CAPILLARY BLOOD GLUCOSE      POCT Blood Glucose.: 78 mg/dL (11 Sep 2020 13:04)  POCT Blood Glucose.: 71 mg/dL (11 Sep 2020 08:23)  POCT Blood Glucose.: 69 mg/dL (11 Sep 2020 08:22)  POCT Blood Glucose.: 69 mg/dL (11 Sep 2020 08:21)  POCT Blood Glucose.: 121 mg/dL (10 Sep 2020 22:12)  POCT Blood Glucose.: 89 mg/dL (10 Sep 2020 16:56)    I&O's Summary      PHYSICAL EXAM:  Vital Signs Last 24 Hrs  T(C): 36.7 (09-11-20 @ 05:24)  T(F): 98.1 (09-11-20 @ 05:24), Max: 98.3 (09-10-20 @ 20:57)  HR: 64 (09-11-20 @ 05:24) (53 - 100)  BP: 125/57 (09-11-20 @ 05:24)  BP(mean): --  RR: 18 (09-11-20 @ 05:24) (18 - 19)  SpO2: 99% (09-11-20 @ 05:24) (99% - 100%)  Wt(kg): --    Constitutional: NAD, awake and alert  EYES: EOMI  ENT:  Normal Hearing, no tonsillar exudates   Neck: Soft and supple , no thyromegaly   Respiratory: Breath sounds are clear bilaterally, No wheezing, rales or rhonchi  Cardiovascular: S1 and S2, regular rate and rhythm, no Murmurs, gallops or rubs, no JVD,    Gastrointestinal: Bowel Sounds present, soft, nontender, nondistended, no guarding, no rebound  Extremities: No cyanosis or clubbing; warm to touch  Vascular: 2+ peripheral pulses lower ex  Neurological: No focal deficits, CN II-XII intact bilaterally, sensation to light touch intact in all extremities, gait intact. Pupils are equally reactive to light and symmetrical in size.   Musculoskeletal: 5/5 strength b/l upper and lower extremities; no joint swelling.  Skin: No rashes  Psych: no depression or anhedonia, AAOx3  HEME: no bruises, no nose bleeds      LABS:                        9.1    8.19  )-----------( 201      ( 11 Sep 2020 07:30 )             29.6     09-11    142  |  108<H>  |  11  ----------------------------<  91  4.7   |  24  |  0.84    Ca    10.1      11 Sep 2020 07:30  Phos  2.6     09-11  Mg     1.9     09-11    TPro  6.4  /  Alb  3.7  /  TBili  0.5  /  DBili  x   /  AST  11  /  ALT  9   /  AlkPhos  64  09-11              RADIOLOGY & ADDITIONAL TESTS:    Imaging Personally Reviewed:    Consultant(s) Notes Reviewed:      Care Discussed with Consultants/Other Providers:

## 2020-09-11 NOTE — PHYSICAL THERAPY INITIAL EVALUATION ADULT - CRITERIA FOR SKILLED THERAPEUTIC INTERVENTIONS
anticipated discharge recommendation/impairments found/predicted duration of therapy intervention/therapy frequency/rehab potential

## 2020-09-11 NOTE — DIETITIAN INITIAL EVALUATION ADULT. - PERTINENT MEDS FT
MEDICATIONS  (STANDING):  apixaban 5 milliGRAM(s) Oral every 12 hours  budesonide 160 MICROgram(s)/formoterol 4.5 MICROgram(s) Inhaler 2 Puff(s) Inhalation two times a day  dextrose 5%. 1000 milliLiter(s) (50 mL/Hr) IV Continuous <Continuous>  dextrose 50% Injectable 12.5 Gram(s) IV Push once  dextrose 50% Injectable 25 Gram(s) IV Push once  dextrose 50% Injectable 25 Gram(s) IV Push once  dicyclomine 20 milliGRAM(s) Oral two times a day before meals  ferrous    sulfate 325 milliGRAM(s) Oral daily  folic acid 1 milliGRAM(s) Oral daily  gabapentin 300 milliGRAM(s) Oral two times a day  influenza   Vaccine 0.5 milliLiter(s) IntraMuscular once  insulin lispro (HumaLOG) corrective regimen sliding scale   SubCutaneous three times a day before meals  levothyroxine 150 MICROGram(s) Oral daily  loratadine 10 milliGRAM(s) Oral daily  NIFEdipine XL 30 milliGRAM(s) Oral daily  pantoprazole    Tablet 40 milliGRAM(s) Oral before breakfast  simvastatin 20 milliGRAM(s) Oral at bedtime  sodium chloride 0.9%. 1000 milliLiter(s) (70 mL/Hr) IV Continuous <Continuous>  triamcinolone 0.1% Cream 1 Application(s) Topical two times a day

## 2020-09-12 ENCOUNTER — TRANSCRIPTION ENCOUNTER (OUTPATIENT)
Age: 75
End: 2020-09-12

## 2020-09-12 VITALS
OXYGEN SATURATION: 98 % | SYSTOLIC BLOOD PRESSURE: 125 MMHG | HEART RATE: 76 BPM | DIASTOLIC BLOOD PRESSURE: 67 MMHG | TEMPERATURE: 99 F | RESPIRATION RATE: 17 BRPM

## 2020-09-12 DIAGNOSIS — R91.1 SOLITARY PULMONARY NODULE: ICD-10-CM

## 2020-09-12 LAB
GLUCOSE BLDC GLUCOMTR-MCNC: 124 MG/DL — HIGH (ref 70–99)
GLUCOSE BLDC GLUCOMTR-MCNC: 97 MG/DL — SIGNIFICANT CHANGE UP (ref 70–99)

## 2020-09-12 PROCEDURE — 99239 HOSP IP/OBS DSCHRG MGMT >30: CPT

## 2020-09-12 RX ORDER — METFORMIN HYDROCHLORIDE 850 MG/1
1 TABLET ORAL
Qty: 0 | Refills: 0 | DISCHARGE

## 2020-09-12 RX ORDER — PANTOPRAZOLE SODIUM 20 MG/1
1 TABLET, DELAYED RELEASE ORAL
Qty: 30 | Refills: 0
Start: 2020-09-12

## 2020-09-12 RX ADMIN — Medication 30 MILLIGRAM(S): at 06:00

## 2020-09-12 RX ADMIN — Medication 1 APPLICATION(S): at 06:00

## 2020-09-12 RX ADMIN — LORATADINE 10 MILLIGRAM(S): 10 TABLET ORAL at 11:37

## 2020-09-12 RX ADMIN — PANTOPRAZOLE SODIUM 40 MILLIGRAM(S): 20 TABLET, DELAYED RELEASE ORAL at 07:00

## 2020-09-12 RX ADMIN — BUDESONIDE AND FORMOTEROL FUMARATE DIHYDRATE 2 PUFF(S): 160; 4.5 AEROSOL RESPIRATORY (INHALATION) at 10:20

## 2020-09-12 RX ADMIN — APIXABAN 5 MILLIGRAM(S): 2.5 TABLET, FILM COATED ORAL at 06:00

## 2020-09-12 RX ADMIN — Medication 325 MILLIGRAM(S): at 11:37

## 2020-09-12 RX ADMIN — Medication 1 MILLIGRAM(S): at 11:37

## 2020-09-12 RX ADMIN — Medication 150 MICROGRAM(S): at 07:00

## 2020-09-12 RX ADMIN — GABAPENTIN 300 MILLIGRAM(S): 400 CAPSULE ORAL at 06:00

## 2020-09-12 NOTE — PROGRESS NOTE ADULT - PROBLEM SELECTOR PLAN 4
Patient w/ psoriasis not on medication per patient  - triamcinolone cream BID   - loratadine for itching, takes Allegra at home

## 2020-09-12 NOTE — PROGRESS NOTE ADULT - PROBLEM SELECTOR PLAN 1
Epigastric pain w/ recent EGD w/o any pathology, only atrophic gastritis. May be having malabsorption causing weight loss. Maalox helping epigastric pain- may be 2/2 GERD.   - Cont. Maalox, Bentyl, PPI, Tylenol PRN

## 2020-09-12 NOTE — PROGRESS NOTE ADULT - ASSESSMENT
Patient is a 75 year old woman w/ past medical history of DM, DVT Eliquis presenting w/ one week of epigastric pain.

## 2020-09-12 NOTE — DISCHARGE NOTE NURSING/CASE MANAGEMENT/SOCIAL WORK - PATIENT PORTAL LINK FT
You can access the FollowMyHealth Patient Portal offered by [facility] by registering at the following website: [email or web address]. By joining [employer]’s FollowMyHealth portal, you will also be able to view your health information using other applications (apps) compatible with our system.

## 2020-09-12 NOTE — PROGRESS NOTE ADULT - NUTRITIONAL ASSESSMENT
This patient has been assessed with a concern for Malnutrition and has been determined to have a diagnosis/diagnoses of Severe protein-calorie malnutrition.    This patient is being managed with:   Diet Consistent Carbohydrate w/Evening Snack-  Supplement Feeding Modality:  Oral  Ensure Enlive Cans or Servings Per Day:  2       Frequency:  Daily  Entered: Sep 12 2020  1:49PM

## 2020-09-12 NOTE — PROGRESS NOTE ADULT - SUBJECTIVE AND OBJECTIVE BOX
Patient is a 75y old  Female who presents with a chief complaint of Epigastric Pain (11 Sep 2020 12:00)      SUBJECTIVE / OVERNIGHT EVENTS:    Patient ate her breakfast. Has some mild epigastric  pain but it is improved with maalox. Is ambulating, feels well to go home.     MEDICATIONS  (STANDING):  apixaban 5 milliGRAM(s) Oral every 12 hours  budesonide 160 MICROgram(s)/formoterol 4.5 MICROgram(s) Inhaler 2 Puff(s) Inhalation two times a day  dextrose 5%. 1000 milliLiter(s) (50 mL/Hr) IV Continuous <Continuous>  dextrose 50% Injectable 12.5 Gram(s) IV Push once  dextrose 50% Injectable 25 Gram(s) IV Push once  dextrose 50% Injectable 25 Gram(s) IV Push once  dicyclomine 20 milliGRAM(s) Oral two times a day before meals  ferrous    sulfate 325 milliGRAM(s) Oral daily  folic acid 1 milliGRAM(s) Oral daily  gabapentin 300 milliGRAM(s) Oral two times a day  influenza   Vaccine 0.5 milliLiter(s) IntraMuscular once  insulin lispro (HumaLOG) corrective regimen sliding scale   SubCutaneous three times a day before meals  levothyroxine 150 MICROGram(s) Oral daily  loratadine 10 milliGRAM(s) Oral daily  NIFEdipine XL 30 milliGRAM(s) Oral daily  pantoprazole    Tablet 40 milliGRAM(s) Oral before breakfast  simvastatin 20 milliGRAM(s) Oral at bedtime  sodium chloride 0.9%. 1000 milliLiter(s) (70 mL/Hr) IV Continuous <Continuous>  triamcinolone 0.1% Cream 1 Application(s) Topical two times a day    MEDICATIONS  (PRN):  acetaminophen   Tablet .. 650 milliGRAM(s) Oral every 6 hours PRN Temp greater or equal to 38C (100.4F), Moderate Pain (4 - 6)  ALBUTerol    90 MICROgram(s) HFA Inhaler 2 Puff(s) Inhalation every 6 hours PRN SOB  aluminum hydroxide/magnesium hydroxide/simethicone Suspension 30 milliLiter(s) Oral every 6 hours PRN Dyspepsia  dextrose 40% Gel 15 Gram(s) Oral once PRN Blood Glucose LESS THAN 70 milliGRAM(s)/deciliter  glucagon  Injectable 1 milliGRAM(s) IntraMuscular once PRN Glucose LESS THAN 70 milligrams/deciliter  simethicone 80 milliGRAM(s) Chew every 6 hours PRN Gas      Vital Signs Last 24 Hrs  T(C): 36.7 (11 Sep 2020 05:24), Max: 36.8 (10 Sep 2020 20:57)  T(F): 98.1 (11 Sep 2020 05:24), Max: 98.3 (10 Sep 2020 20:57)  HR: 64 (11 Sep 2020 05:24) (53 - 100)  BP: 125/57 (11 Sep 2020 05:24) (125/57 - 151/86)  BP(mean): --  RR: 18 (11 Sep 2020 05:24) (18 - 19)  SpO2: 99% (11 Sep 2020 05:24) (99% - 100%)  CAPILLARY BLOOD GLUCOSE      POCT Blood Glucose.: 78 mg/dL (11 Sep 2020 13:04)  POCT Blood Glucose.: 71 mg/dL (11 Sep 2020 08:23)  POCT Blood Glucose.: 69 mg/dL (11 Sep 2020 08:22)  POCT Blood Glucose.: 69 mg/dL (11 Sep 2020 08:21)  POCT Blood Glucose.: 121 mg/dL (10 Sep 2020 22:12)  POCT Blood Glucose.: 89 mg/dL (10 Sep 2020 16:56)    I&O's Summary      PHYSICAL EXAM:  Vital Signs Last 24 Hrs  T(C): 36.7 (09-11-20 @ 05:24)  T(F): 98.1 (09-11-20 @ 05:24), Max: 98.3 (09-10-20 @ 20:57)  HR: 64 (09-11-20 @ 05:24) (53 - 100)  BP: 125/57 (09-11-20 @ 05:24)  BP(mean): --  RR: 18 (09-11-20 @ 05:24) (18 - 19)  SpO2: 99% (09-11-20 @ 05:24) (99% - 100%)  Wt(kg): --    Constitutional: NAD, awake and alert  EYES: EOMI  ENT:  Normal Hearing, no tonsillar exudates   Neck: Soft and supple , no thyromegaly   Respiratory: Breath sounds are clear bilaterally, No wheezing, rales or rhonchi  Cardiovascular: S1 and S2, regular rate and rhythm, no Murmurs, gallops or rubs, no JVD,    Gastrointestinal: Bowel Sounds present, soft, nontender, nondistended, no guarding, no rebound  Extremities: No cyanosis or clubbing; warm to touch  Vascular: 2+ peripheral pulses lower ex  Neurological: No focal deficits, CN II-XII intact bilaterally, sensation to light touch intact in all extremities, gait intact. Pupils are equally reactive to light and symmetrical in size.   Musculoskeletal: 5/5 strength b/l upper and lower extremities; no joint swelling.  Skin: No rashes  Psych: no depression or anhedonia, AAOx3  HEME: no bruises, no nose bleeds      LABS:                        9.1    8.19  )-----------( 201      ( 11 Sep 2020 07:30 )             29.6     09-11    142  |  108<H>  |  11  ----------------------------<  91  4.7   |  24  |  0.84    Ca    10.1      11 Sep 2020 07:30  Phos  2.6     09-11  Mg     1.9     09-11    TPro  6.4  /  Alb  3.7  /  TBili  0.5  /  DBili  x   /  AST  11  /  ALT  9   /  AlkPhos  64  09-11              RADIOLOGY & ADDITIONAL TESTS:    Imaging Personally Reviewed:    Consultant(s) Notes Reviewed:      Care Discussed with Consultants/Other Providers:

## 2020-09-12 NOTE — PROGRESS NOTE ADULT - PROBLEM SELECTOR PLAN 2
CT chest obtained as part of weight loss workup. Found to have bilateral centrilobular nodules and patchy opacities. Recommend OP follow up with Pulmonology which patient was agreeable to.

## 2020-09-12 NOTE — DOWNTIME INTERRUPTION NOTE - WHICH MANUAL FORMS INITIATED?
Accucheck Flowsheet  Vital sign/pain management flowsheet  Outcome evaluation flowsheet  SCM downtime order report  SCM EMAR downtime report

## 2021-03-11 NOTE — PATIENT PROFILE ADULT - NSPROEXTENSIONSOFSELF_GEN_A_NUR
Try the hydrocortisone 2-3 times per day.  Please let me know if this does not improve.    There is no sign of a urinary tract infection.    Please let me know if you have any questions.  Sophy Carlson PA-C     none

## 2021-10-07 NOTE — PATIENT PROFILE ADULT - FALLEN IN THE PAST
[facility]  Nephrology Attending Progress Note  Jose Maier MD        SUBJECTIVE:   From consult 10/3/21: This is a 72 y.o. male with a H/O Chronic HFpEF, combined pre-/post capillary pulmonary hypertension, hypertension, obesity, type 2 diabetes mellitus, GERD, hyperlipidemia, end-stage renal disease status post kidney transplant 12/2014 ([facility]), OLINDA (compliant with CPAP), former smoker (quit 1991) who now presents to ED with worsening shortness of breath over the past few days. Vitals upon arrival included BP (!) 155/71   Pulse 69   Temp 98.5 °F (36.9 °C) (Temporal)   Resp 18   Ht 5' 11\" (1.803 m)   Wt 212 lb (96.2 kg)   SpO2 100%   BMI 29.57 kg/m². Pertinent labs included WBC 5.9, hemoglobin 10.6, hematocrit 36.4 and platelet count 379. BMP revealed sodium 138, potassium 5.9, chloride 98, CO2 27, BUN 50 and creatinine 3.6. BNP 22,645. Chest x-ray showed bilateral infiltrates and pulmonary edema. ED course included treatment for the hyperkalemia and IV diuretics. He was subsequently admitted with acute decompensated heart failure.     Patient now examined sitting up in bed in no acute distress. He does state complaints of gradual shortness of breath x2 days prior to arrival. Patient reported that he went to [place] on 10/1/2021 and tested positive for Covid-19.  In addition to shortness of breath he reports having orthopnea and lower extremity edema along with intermittent midsternal tightness and diffuse abdominal pain.  He reports a white productive cough and chills.  He has been taking his home medications including diuretics with normal urine output. 10/4/21- resting in bed, no acute distress.      10/5: pt seen in room, no complaints, no cp or sob    10/6: pt seen in room, feels ok, sob improved    10/7: pt seen In room, feeling better, denies sob      PROBLEM LIST:    Patient Active Problem List   Diagnosis    GERD (gastroesophageal reflux disease)    CKD (chronic kidney disease) stage 4, GFR 15-29 ml/min (HCC)    Mixed hyperlipidemia    Diabetes mellitus, type 2 (HCC)    HTN (hypertension), benign    Junctional bradycardia    Anemia in stage 3 chronic kidney disease (HCC)    CAD (coronary artery disease), native coronary artery    Acute CH[facility]congestive heart failure) [facility]    Chest pain    Respiratory failure (HCC)    Acute kidney injury (HCC)    Vomiting and diarrhea    Acute kidney injury superimposed on CKD (HCC)    Chronic diastolic congestive heart failure ([address]    H/O kidney transplant    Pulmonary hypertension (HCC)    [facility]uppression [facility]    Pneumonia due to COVID-19 virus    Acute renal insufficiency    GI bleed    Acute blood loss anemia    Acute combined systolic and diastolic CHF, NYHA class 1 ([facility])    Chronic kidney disease    Anemia of chronic disease    Hyperkalemia    Essential hypertension    Type 2 diabetes mellitus, with long-term current use of insulin ([facility])    Obesity (BMI 30-39. 9)    Acute decompensated heart failure ([facility])        PAST MEDICAL HISTORY:    Past Medical History:   Diagnosis Date    Anemia     Iron deficiency    Atrial fibrillation ([facility]     CHF (congestive heart failure) ([facility] 03/12/2008    Chronic diastolic congestive heart failure ([address] 03/12/2008    Chronic foot pain     Chronic knee pain     BILATERAL    Constipation     Depression     DM (diabetes mellitus) ([facility]     ESRD (end stage renal disease) ([facility]     H/O kidney transplant 12/12/2014    Hyperlipidemia     Hypertension     Immunosuppression ([facility] 3/12/2008    Kidney disease     Pulmonary hypertension ([facility]        DIET:    Diet NPO     PHYSICAL EXAM:     Patient Vitals for the past 24 hrs:   BP Temp Temp src Pulse Resp SpO2   10/07/21 1027 (!) 143/67 -- -- -- -- --   10/07/21 0938 -- -- -- -- -- 97 %   10/07/21 0730 (!) 181/70 98 °F (36.7 °C) Temporal 74 18 99 %   10/06/21 2000 (!) 166/82 98 °F (36.7 °C) Temporal 84 16 98 %   10/06/21 1554 132/80 97.6 °F (36.4 °C) Temporal 72 16 99 %   10/06/21 1400 (!) 150/68 -- -- 70 18 --   @      Intake/Output Summary (Last 24 hours) at 10/7/2021 1044  Last data filed at 10/7/2021 0630  Gross per 24 hour   Intake 240 ml   Output 950 ml   Net -710 ml         Wt Readings from Last 3 Encounters:   10/04/21 221 lb 8 oz (100.5 kg)   07/29/21 208 lb 12.8 oz (94.7 kg)   06/24/21 215 lb (97.5 kg)       Constitutional:  Pt is in no acute distress  Head: normocephalic, atraumatic  Neck: no JVD  Cardiovascular: S1 S2 no S3 or rub  Respiratory:  Clear upper, diminisihed in bases  Gastrointestinal:  Soft, nontender,  bowel sounds x 4  Ext: + edema   Skin: dry, no rash  Neuro: alert and oriented    MEDS (scheduled):    bumetanide  2 mg Oral Daily    heparin (porcine)  5,000 Units SubCUTAneous Q8H    epoetin rowan-epbx  30 Units/kg SubCUTAneous Once per day on Mon Wed Fri    aspirin  81 mg Oral Daily    hydrALAZINE  100 mg Oral TID    isosorbide mononitrate  60 mg Oral Daily    metoprolol succinate  100 mg Oral Daily    mycophenolate  500 mg Oral BID    predniSONE  5 mg Oral Daily    atorvastatin  10 mg Oral Daily    sodium bicarbonate  650 mg Oral 4x Daily    tacrolimus  3 mg Oral BID    insulin lispro  0-6 Units SubCUTAneous TID WC    insulin lispro  0-3 Units SubCUTAneous Nightly    sodium chloride flush  5-40 mL IntraVENous 2 times per day    ipratropium-albuterol  1 ampule Inhalation Q4H WA       MEDS (infusions):   dextrose      sodium chloride         MEDS (prn):  regadenoson, oxyCODONE, glucose, dextrose, glucagon (rDNA), dextrose, sodium chloride flush, sodium chloride, ondansetron **OR** ondansetron, polyethylene glycol, acetaminophen **OR** acetaminophen    DATA:    Recent Labs     10/05/21  0522   WBC 4.4*   HGB 9.9*   HCT 34.5*   MCV 86.5        Recent Labs     10/04/21  1439 10/05/21  0522 10/06/21  0646    137 136   K 5.5* 4.7 5.2*   CL 96* 95* 95*   CO2 33* 30* 32*   BUN 65* 61* 57* CREATININE 4.6* 4.3* 3.7*   LABGLOM 15 17 20   GLUCOSE 193* 120* 120*   CALCIUM 9.4 8.9 9.2   MG  --  2.2 2.2   PHOS  --  3.4  --        Lab Results   Component Value Date    LABALBU 3.6 10/02/2021    LABALBU 3.7 06/11/2021    LABALBU 2.7 (L) 04/27/2021     Lab Results   Component Value Date    TSH 0.996 06/03/2014       Iron Studies  Lab Results   Component Value Date    IRON 30 (L) 10/02/2021    TIBC 173 (L) 10/02/2021    FERRITIN 108 09/13/2021     Vitamin B-12   Date Value Ref Range Status   04/28/2017 281 211 - 946 pg/mL Final     Folate   Date Value Ref Range Status   04/28/2017 7.4 4.8 - 24.2 ng/mL Final       Vit D, 25-Hydroxy   Date Value Ref Range Status   10/05/2021 77 30 - 100 ng/mL Final     Comment:     <20 ng/mL. ........... Destiny Karst Deficient  20-30 ng/mL. ......... Destiny Karst Insufficient   ng/mL. ........ Destiny Karst Sufficient  >100 ng/mL. .......... Destiny Cruzst Toxic       PTH   Date Value Ref Range Status   10/05/2021 233 (H) 15 - 65 pg/mL Final       No components found for: URIC    Lab Results   Component Value Date    COLORU Yellow 04/10/2021    NITRU Negative 04/10/2021    GLUCOSEU Negative 04/10/2021    KETUA Negative 04/10/2021    UROBILINOGEN 0.2 04/10/2021    BILIRUBINUR Negative 04/10/2021       No results found for: Guera      IMPRESSION/RECOMMENDATIONS:      1- CKD5D/ESRD  With RRT as LRD Renal Allograft placed 12/12/14 with a baseline serum cr of 2.1-2.7mg/dl in all of 2021   TXP US (4/19/21) No hydro/1cm complex renal cyst, no MARI  Tacrolimus level 18.8 (from 4/17/21), 9.9 0n 9/7/21, 10/5 p  Renal function w/ scr improving  Strict intake and output, daily labs     2- Anemia in CKD  Follow   HgB above goal >/=10  EGD 4/28/21  PRBC 4/28/21  Start carmen     3- HTN with CKD 5/ESRD  BP at goal <130/80  Follow     4- Sec HPTH of renal origin  pth 233 p 3.4, vit d 68  Start rocaltrol     5-Hyperkalemia  With ALYSON  improved    6. HFpEF  LVEF 67%  On bid IV bumetanide 1 mg. UOP poorly recorded.     Changed to oral    Check labs daily    German De La Garza MD yes

## 2021-10-12 NOTE — PATIENT PROFILE ADULT - PHONE #
2021  EMPLOYEE INFORMATION: EMPLOYER INFORMATION:   NAME: Lani MURRAY [employer]   : 1986 [phone]    DATE OF INJURY/EVENT: 10/9/2021           Location: [facility] [address]   Treating Provider: Soledad Carlos MD  Time In:  1:37 PM Time Out:  2:37 PM      DIAGNOSIS:   1. Hand crush injury, left, initial encounter      STATUS: This injury is determined to be WORK RELATED.    RETURN TO WORK: Employee may return to work without restrictions.  Return Date: 10/12/2021            RESTRICTIONS: Restrictions are to be followed at work and at home.  Restrictions are in effect until next follow-up visit.      Limit pinching and grasping with the left hand as tolerated.  May wear the compression wrap she has as needed     TREATMENT PLAN:   Medications for this injury/condition: Ibuprofen 200 mg 2 tablets with Tylenol 500 mg 1 tablet every 8 hours as needed  Referral/Consult: None  Diagnostic Testing: XR HAND 3+ VIEW LEFT       Instructions: X-rays were negative.  This should continue to improve.  Warm compresses or soaks may be helpful.    NEXT RETURN VISIT:    at 11:15 AM  Thank you for the privilege of providing medical care for this injury/condition.  If there are any questions, please call the occupational health clinic at Dept: [phone].      Electronically signed on 10/12/2021 at 2:35 PM by:   Soledad Carlos MD   [facility]    
[phone]

## 2021-10-18 NOTE — H&P ADULT - PROBLEM/PLAN-4
Tru Carlson (pt's daughter) and Piotr Lackey (pt's ) called for pathology reports. Senia Conley.  Perico Moreno DISPLAY PLAN FREE TEXT
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
## 2021-10-27 NOTE — PROGRESS NOTE ADULT - PROBLEM SELECTOR PLAN 10
Pt reports lower back started last night--non radiating--Pt also reports she has been feeling weak/loss of appetite.    PMHX of CHF
- home w/ no needs
- home w/ no needs. D/C home today as patient is tolerating a regular diet.

## 2021-12-22 NOTE — H&P ADULT - NSHPPOAPULMEMBOLUS_GEN_A_CORE
Patient alert and oriented x 4, per PT patient walked a few steps with walker, but her knees buckled and fell.
no

## 2022-05-28 NOTE — DISCHARGE NOTE NURSING/CASE MANAGEMENT/SOCIAL WORK - NSDCPETBCESMAN_GEN_ALL_CORE
cough/fever If you are a smoker, it is important for your health to stop smoking. Please be aware that second hand smoke is also harmful.

## 2022-05-31 ENCOUNTER — INPATIENT (INPATIENT)
Facility: HOSPITAL | Age: 77
LOS: 2 days | Discharge: HOME | End: 2022-06-03
Attending: INTERNAL MEDICINE | Admitting: INTERNAL MEDICINE
Payer: MEDICARE

## 2022-05-31 VITALS
WEIGHT: 205.03 LBS | SYSTOLIC BLOOD PRESSURE: 128 MMHG | HEIGHT: 62 IN | RESPIRATION RATE: 18 BRPM | TEMPERATURE: 99 F | OXYGEN SATURATION: 96 % | HEART RATE: 90 BPM | DIASTOLIC BLOOD PRESSURE: 69 MMHG

## 2022-05-31 DIAGNOSIS — Z98.890 OTHER SPECIFIED POSTPROCEDURAL STATES: Chronic | ICD-10-CM

## 2022-05-31 PROBLEM — D51.0 VITAMIN B12 DEFICIENCY ANEMIA DUE TO INTRINSIC FACTOR DEFICIENCY: Chronic | Status: ACTIVE | Noted: 2020-09-10

## 2022-05-31 LAB
ALBUMIN SERPL ELPH-MCNC: 3.4 G/DL — SIGNIFICANT CHANGE UP (ref 3.3–5)
ALP SERPL-CCNC: 152 U/L — HIGH (ref 40–120)
ALT FLD-CCNC: 13 U/L — SIGNIFICANT CHANGE UP (ref 12–78)
ANION GAP SERPL CALC-SCNC: 8 MMOL/L — SIGNIFICANT CHANGE UP (ref 5–17)
AST SERPL-CCNC: 23 U/L — SIGNIFICANT CHANGE UP (ref 15–37)
BASOPHILS # BLD AUTO: 0.11 K/UL — SIGNIFICANT CHANGE UP (ref 0–0.2)
BASOPHILS NFR BLD AUTO: 0.9 % — SIGNIFICANT CHANGE UP (ref 0–2)
BILIRUB SERPL-MCNC: 0.5 MG/DL — SIGNIFICANT CHANGE UP (ref 0.2–1.2)
BUN SERPL-MCNC: 12 MG/DL — SIGNIFICANT CHANGE UP (ref 7–23)
CALCIUM SERPL-MCNC: 10.1 MG/DL — SIGNIFICANT CHANGE UP (ref 8.5–10.1)
CHLORIDE SERPL-SCNC: 104 MMOL/L — SIGNIFICANT CHANGE UP (ref 96–108)
CO2 SERPL-SCNC: 24 MMOL/L — SIGNIFICANT CHANGE UP (ref 22–31)
CREAT SERPL-MCNC: 1 MG/DL — SIGNIFICANT CHANGE UP (ref 0.5–1.3)
D DIMER BLD IA.RAPID-MCNC: 429 NG/ML DDU — HIGH
EGFR: 59 ML/MIN/1.73M2 — LOW
EOSINOPHIL # BLD AUTO: 0.55 K/UL — HIGH (ref 0–0.5)
EOSINOPHIL NFR BLD AUTO: 4.3 % — SIGNIFICANT CHANGE UP (ref 0–6)
FLUAV AG NPH QL: SIGNIFICANT CHANGE UP
FLUBV AG NPH QL: SIGNIFICANT CHANGE UP
GLUCOSE SERPL-MCNC: 103 MG/DL — HIGH (ref 70–99)
HCT VFR BLD CALC: 28.9 % — LOW (ref 34.5–45)
HGB BLD-MCNC: 8.3 G/DL — LOW (ref 11.5–15.5)
IMM GRANULOCYTES NFR BLD AUTO: 0.5 % — SIGNIFICANT CHANGE UP (ref 0–1.5)
LIDOCAIN IGE QN: 58 U/L — LOW (ref 73–393)
LYMPHOCYTES # BLD AUTO: 26.2 % — SIGNIFICANT CHANGE UP (ref 13–44)
LYMPHOCYTES # BLD AUTO: 3.35 K/UL — HIGH (ref 1–3.3)
MAGNESIUM SERPL-MCNC: 2.2 MG/DL — SIGNIFICANT CHANGE UP (ref 1.6–2.6)
MCHC RBC-ENTMCNC: 20.2 PG — LOW (ref 27–34)
MCHC RBC-ENTMCNC: 28.7 G/DL — LOW (ref 32–36)
MCV RBC AUTO: 70.5 FL — LOW (ref 80–100)
MONOCYTES # BLD AUTO: 0.65 K/UL — SIGNIFICANT CHANGE UP (ref 0–0.9)
MONOCYTES NFR BLD AUTO: 5.1 % — SIGNIFICANT CHANGE UP (ref 2–14)
NEUTROPHILS # BLD AUTO: 8.08 K/UL — HIGH (ref 1.8–7.4)
NEUTROPHILS NFR BLD AUTO: 63 % — SIGNIFICANT CHANGE UP (ref 43–77)
NRBC # BLD: 0 /100 WBCS — SIGNIFICANT CHANGE UP (ref 0–0)
NT-PROBNP SERPL-SCNC: 101 PG/ML — SIGNIFICANT CHANGE UP (ref 0–450)
PLATELET # BLD AUTO: 270 K/UL — SIGNIFICANT CHANGE UP (ref 150–400)
POTASSIUM SERPL-MCNC: 4.3 MMOL/L — SIGNIFICANT CHANGE UP (ref 3.5–5.3)
POTASSIUM SERPL-SCNC: 4.3 MMOL/L — SIGNIFICANT CHANGE UP (ref 3.5–5.3)
PROT SERPL-MCNC: 8.9 GM/DL — HIGH (ref 6–8.3)
RBC # BLD: 4.1 M/UL — SIGNIFICANT CHANGE UP (ref 3.8–5.2)
RBC # FLD: 20.5 % — HIGH (ref 10.3–14.5)
SARS-COV-2 RNA SPEC QL NAA+PROBE: SIGNIFICANT CHANGE UP
SODIUM SERPL-SCNC: 136 MMOL/L — SIGNIFICANT CHANGE UP (ref 135–145)
TROPONIN I, HIGH SENSITIVITY RESULT: 22.2 NG/L — SIGNIFICANT CHANGE UP
WBC # BLD: 12.81 K/UL — HIGH (ref 3.8–10.5)
WBC # FLD AUTO: 12.81 K/UL — HIGH (ref 3.8–10.5)

## 2022-05-31 PROCEDURE — 99285 EMERGENCY DEPT VISIT HI MDM: CPT

## 2022-05-31 PROCEDURE — 93970 EXTREMITY STUDY: CPT | Mod: 26

## 2022-05-31 PROCEDURE — 93010 ELECTROCARDIOGRAM REPORT: CPT

## 2022-05-31 PROCEDURE — 71046 X-RAY EXAM CHEST 2 VIEWS: CPT | Mod: 26

## 2022-05-31 RX ORDER — ENOXAPARIN SODIUM 100 MG/ML
90 INJECTION SUBCUTANEOUS ONCE
Refills: 0 | Status: COMPLETED | OUTPATIENT
Start: 2022-05-31 | End: 2022-05-31

## 2022-05-31 RX ORDER — CEFAZOLIN SODIUM 1 G
1000 VIAL (EA) INJECTION ONCE
Refills: 0 | Status: COMPLETED | OUTPATIENT
Start: 2022-05-31 | End: 2022-05-31

## 2022-05-31 RX ADMIN — Medication 100 MILLIGRAM(S): at 18:34

## 2022-05-31 RX ADMIN — ENOXAPARIN SODIUM 90 MILLIGRAM(S): 100 INJECTION SUBCUTANEOUS at 22:25

## 2022-05-31 NOTE — ED ADULT NURSE REASSESSMENT NOTE - NS ED NURSE REASSESS COMMENT FT1
report given to  holding RN Xin Conner. nurse made aware Ultrasound guided line needed MD Murphy made aware

## 2022-05-31 NOTE — ED ADULT NURSE NOTE - ED STAT RN HANDOFF DETAILS 2
Assuming patient's care for coverage. Report received from RN and [facility],multiple attempts for iv access causing delay in abx administration

## 2022-05-31 NOTE — ED ADULT NURSE NOTE - NSICDXPASTMEDICALHX_GEN_ALL_CORE_FT
PAST MEDICAL HISTORY:  Diabetes Type 2 diabetes    DVT (deep venous thrombosis), right Bilateral LE DVT    Essential hypertension     Hypothyroid     Myocardial infarction approximately 49y/o    Pernicious anemia B12 150 with elevated MMA, vitiligo   EGD with atrophic gastritis    Psoriasis     Vitiligo

## 2022-05-31 NOTE — ED ADULT NURSE NOTE - OBJECTIVE STATEMENT
assisting primary RN at this time 5 yo F, pmhx DM, DVT (not on AC), hypothyroid, psoriasis, asthma, vitiligo, hypothyroidism, HTN, presenting to the ED w/ alize LE swelling and erythema x 5 days. Patient states she began scratching the area of the LEs first then noted small reddened areas around the scabs that have later shown. Pt reports she has noted increased swelling of the alize LEs as well. Pt does not recall h/o DVT or clots in the legs but reports she does not recall any prior history.

## 2022-05-31 NOTE — ED PROVIDER NOTE - SKIN, MLM
+multiple scabs noted on the alize LEs w/ mild surrounding erythema, few scattered papules, 3-4+ pitting edema; +alize calf ttp L>R

## 2022-05-31 NOTE — ED PROVIDER NOTE - OBJECTIVE STATEMENT
77 yo F, pmhx DM, DVT (on Eliquis), hypothyroid, psoriasis, asthma, vitiligo, hypothyroidism, HTN, presenting to the ED w/ alize LE swelling and erythema x 5 days. 77 yo F, pmhx DM, DVT (on Eliquis), hypothyroid, psoriasis, asthma, vitiligo, hypothyroidism, HTN, presenting to the ED w/ alize LE swelling and erythema x 5 days. Patient states she began scratching the area of the LEs first then noted small reddened areas around the scabs that have later shown. Pt reports she has noted increased swelling of the alize LEs as well. Pt does not recall h/o DVT or clots in the legs but reports she does not recall any prior history.  Pt denies direct trauma to the area, headaches, dizziness, chest pain, SOB, fevers, chills, nausea, vomiting, diarrhea, or urinary symptoms. 75 yo F, pmhx DM, DVT (not on AC), hypothyroid, psoriasis, asthma, vitiligo, hypothyroidism, HTN, presenting to the ED w/ alize LE swelling and erythema x 5 days. Patient states she began scratching the area of the LEs first then noted small reddened areas around the scabs that have later shown. Pt reports she has noted increased swelling of the alize LEs as well. Pt does not recall h/o DVT or clots in the legs but reports she does not recall any prior history.  Pt denies direct trauma to the area, headaches, dizziness, chest pain, SOB, fevers, chills, nausea, vomiting, diarrhea, or urinary symptoms.

## 2022-05-31 NOTE — ED PROVIDER NOTE - NSICDXPASTMEDICALHX_GEN_ALL_CORE_FT
PAST MEDICAL HISTORY:  Diabetes Type 2 diabetes    DVT (deep venous thrombosis), right Bilateral LE DVT    Essential hypertension     Hypothyroid     Myocardial infarction approximately 51y/o    Pernicious anemia B12 150 with elevated MMA, vitiligo   EGD with atrophic gastritis    Psoriasis     Vitiligo

## 2022-05-31 NOTE — ED PROVIDER NOTE - CLINICAL SUMMARY MEDICAL DECISION MAKING FREE TEXT BOX
77 yo F, pmhx DM, DVT (on Eliquis), hypothyroid, psoriasis, asthma, vitiligo, hypothyroidism, HTN, presenting to the ED w/ alize LE swelling and erythema x 5 days. Patient also reports calf ttp and found to have new [as per pt] pitting edema alize. Pt reports she is unsure if she has had a DVT dx in the past as she is no longer taking any blood thinners; patient also cannot recall being given the actual diagnosis. Plan to obtain duplex to r/o DVT. Will also send medical labs and empirically cover w/ ancef. Dispo pend medical w/u. 75 yo F, pmhx DM, DVT (not on CT), hypothyroid, psoriasis, asthma, vitiligo, hypothyroidism, HTN, presenting to the ED w/ alize LE swelling and erythema x 5 days. Patient also reports calf ttp and found to have new [as per pt] pitting edema alize. Pt reports she is unsure if she has had a DVT dx in the past as she is no longer taking any blood thinners; patient also cannot recall being given the actual diagnosis. Plan to obtain duplex to r/o DVT. Will also send medical labs and empirically cover w/ ancef. Dispo pend medical w/u.

## 2022-05-31 NOTE — ED PROVIDER NOTE - NS ED ATTENDING STATEMENT MOD
This was a shared visit with the [facility]. I reviewed and verified the documentation and independently performed the documented:

## 2022-06-01 DIAGNOSIS — R09.89 OTHER SPECIFIED SYMPTOMS AND SIGNS INVOLVING THE CIRCULATORY AND RESPIRATORY SYSTEMS: ICD-10-CM

## 2022-06-01 DIAGNOSIS — E03.9 HYPOTHYROIDISM, UNSPECIFIED: ICD-10-CM

## 2022-06-01 DIAGNOSIS — E11.9 TYPE 2 DIABETES MELLITUS WITHOUT COMPLICATIONS: ICD-10-CM

## 2022-06-01 DIAGNOSIS — D64.9 ANEMIA, UNSPECIFIED: ICD-10-CM

## 2022-06-01 DIAGNOSIS — I10 ESSENTIAL (PRIMARY) HYPERTENSION: ICD-10-CM

## 2022-06-01 DIAGNOSIS — I82.409 ACUTE EMBOLISM AND THROMBOSIS OF UNSPECIFIED DEEP VEINS OF UNSPECIFIED LOWER EXTREMITY: ICD-10-CM

## 2022-06-01 PROCEDURE — 71275 CT ANGIOGRAPHY CHEST: CPT | Mod: 26

## 2022-06-01 PROCEDURE — 99221 1ST HOSP IP/OBS SF/LOW 40: CPT

## 2022-06-01 RX ORDER — SIMVASTATIN 20 MG/1
20 TABLET, FILM COATED ORAL AT BEDTIME
Refills: 0 | Status: DISCONTINUED | OUTPATIENT
Start: 2022-06-01 | End: 2022-06-03

## 2022-06-01 RX ORDER — GABAPENTIN 400 MG/1
300 CAPSULE ORAL
Refills: 0 | Status: DISCONTINUED | OUTPATIENT
Start: 2022-06-01 | End: 2022-06-03

## 2022-06-01 RX ORDER — PANTOPRAZOLE SODIUM 20 MG/1
40 TABLET, DELAYED RELEASE ORAL
Refills: 0 | Status: DISCONTINUED | OUTPATIENT
Start: 2022-06-01 | End: 2022-06-03

## 2022-06-01 RX ORDER — ONDANSETRON 8 MG/1
4 TABLET, FILM COATED ORAL EVERY 8 HOURS
Refills: 0 | Status: DISCONTINUED | OUTPATIENT
Start: 2022-06-01 | End: 2022-06-03

## 2022-06-01 RX ORDER — ACETAMINOPHEN 500 MG
650 TABLET ORAL EVERY 6 HOURS
Refills: 0 | Status: DISCONTINUED | OUTPATIENT
Start: 2022-06-01 | End: 2022-06-03

## 2022-06-01 RX ORDER — ENOXAPARIN SODIUM 100 MG/ML
90 INJECTION SUBCUTANEOUS EVERY 12 HOURS
Refills: 0 | Status: DISCONTINUED | OUTPATIENT
Start: 2022-06-01 | End: 2022-06-03

## 2022-06-01 RX ORDER — CEPHALEXIN 500 MG
500 CAPSULE ORAL EVERY 12 HOURS
Refills: 0 | Status: DISCONTINUED | OUTPATIENT
Start: 2022-06-01 | End: 2022-06-03

## 2022-06-01 RX ORDER — FERROUS SULFATE 325(65) MG
325 TABLET ORAL DAILY
Refills: 0 | Status: DISCONTINUED | OUTPATIENT
Start: 2022-06-01 | End: 2022-06-03

## 2022-06-01 RX ORDER — LEVOTHYROXINE SODIUM 125 MCG
150 TABLET ORAL DAILY
Refills: 0 | Status: DISCONTINUED | OUTPATIENT
Start: 2022-06-01 | End: 2022-06-03

## 2022-06-01 RX ADMIN — PANTOPRAZOLE SODIUM 40 MILLIGRAM(S): 20 TABLET, DELAYED RELEASE ORAL at 08:31

## 2022-06-01 RX ADMIN — ENOXAPARIN SODIUM 90 MILLIGRAM(S): 100 INJECTION SUBCUTANEOUS at 22:04

## 2022-06-01 RX ADMIN — SIMVASTATIN 20 MILLIGRAM(S): 20 TABLET, FILM COATED ORAL at 22:03

## 2022-06-01 RX ADMIN — Medication 150 MICROGRAM(S): at 05:29

## 2022-06-01 RX ADMIN — Medication 500 MILLIGRAM(S): at 18:29

## 2022-06-01 RX ADMIN — Medication 650 MILLIGRAM(S): at 08:24

## 2022-06-01 RX ADMIN — Medication 500 MILLIGRAM(S): at 05:29

## 2022-06-01 RX ADMIN — GABAPENTIN 300 MILLIGRAM(S): 400 CAPSULE ORAL at 05:30

## 2022-06-01 RX ADMIN — GABAPENTIN 300 MILLIGRAM(S): 400 CAPSULE ORAL at 18:29

## 2022-06-01 RX ADMIN — Medication 650 MILLIGRAM(S): at 07:29

## 2022-06-01 RX ADMIN — ENOXAPARIN SODIUM 90 MILLIGRAM(S): 100 INJECTION SUBCUTANEOUS at 09:13

## 2022-06-01 RX ADMIN — Medication 325 MILLIGRAM(S): at 12:27

## 2022-06-01 NOTE — H&P ADULT - NSHPREVIEWOFSYSTEMS_GEN_ALL_CORE
REVIEW OF SYSTEMS:    CONSTITUTIONAL: No weakness, fevers or chills  EYES/ENT: No visual changes;  No vertigo or throat pain   NECK: No pain or stiffness  RESPIRATORY: No cough, wheezing, hemoptysis; No shortness of breath  CARDIOVASCULAR: No chest pain or palpitations  GASTROINTESTINAL: No abdominal pain. No nausea, vomiting, or hematemesis; No diarrhea or constipation.   GENITOURINARY: No dysuria, frequency or hematuria  NEUROLOGICAL: No numbness or weakness  SKIN:  + itching, rashes b/l LE  MSK b/l LE swelling with tenderness

## 2022-06-01 NOTE — H&P ADULT - PROBLEM SELECTOR PLAN 1
- New B/l LE DVT   - Pt started on lovenox 90 q12  - Consider switching to DOAC before dc  - FU CTA chest to r/o PE though unlikely  - Consider hematology consult for recurrent DVT

## 2022-06-01 NOTE — H&P ADULT - HISTORY OF PRESENT ILLNESS
75 yo F, pmhx DM, DVT (not on AC), hypothyroid, psoriasis, asthma, vitiligo, hypothyroidism, HTN, presenting to the ED w/ bl LE swelling and erythema x 5 days. Patient states she began scratching the area of the LEs first then noted small reddened areas around the scabs that have later shown. Pt reports she has noted increased swelling of the alize LEs as well. Pt does not recall h/o DVT or clots in the legs but noted in chart . Pt denies direct trauma to the area, headaches, dizziness, chest pain, SOB, fevers, chills, nausea, vomiting, diarrhea, or urinary symptoms. Pt denies any recent travel or prolonged bedrest.     In the ED vitals were non significant. Labs with WBC 12.81 Hgb 8.3 MCV 70, DDimer 429. US duplex b/l DVT above and below knee.

## 2022-06-01 NOTE — PATIENT PROFILE ADULT - FALL HARM RISK - HARM RISK INTERVENTIONS

## 2022-06-01 NOTE — PATIENT PROFILE ADULT - FUNCTIONAL SCREEN CURRENT LEVEL: COMMUNICATION, MLM
Pre-Visit Chart Review  For Appointment Scheduled on 9/22    Health Maintenance Due   Topic Date Due    Lipid Panel  1976    TETANUS VACCINE  04/17/1994    Pneumococcal PPSV23 (Medium Risk) (1) 04/17/1994    Mammogram  04/17/2016    Influenza Vaccine  08/01/2017                      0 = understands/communicates without difficulty

## 2022-06-01 NOTE — H&P ADULT - NSHPLABSRESULTS_GEN_ALL_CORE
=======================================================  Labs:                        8.3    12.81 )-----------( 270      ( 31 May 2022 18:08 )             28.9     05-31    136  |  104  |  12  ----------------------------<  103<H>  4.3   |  24  |  1.00    Ca    10.1      31 May 2022 18:08  Mg     2.2     05-31    TPro  8.9<H>  /  Alb  3.4  /  TBili  0.5  /  DBili  x   /  AST  23  /  ALT  13  /  AlkPhos  152<H>  05-31      Creatinine, Serum: 1.00 mg/dL (05-31-22 @ 18:08)      D-Dimer Assay, Quantitative: 429 ng/mL DDU (05-31-22 @ 18:55)        WBC Count: 12.81 K/uL (05-31-22 @ 18:08)    SARS-CoV-2 Result: NotDetec (05-31-22 @ 18:08)      Alkaline Phosphatase, Serum: 152 U/L (05-31-22 @ 18:08)  Alanine Aminotransferase (ALT/SGPT): 13 U/L (05-31-22 @ 18:08)  Aspartate Aminotransferase (AST/SGOT): 23 U/L (05-31-22 @ 18:08)  Bilirubin Total, Serum: 0.5 mg/dL (05-31-22 @ 18:08)

## 2022-06-01 NOTE — H&P ADULT - NSHPPHYSICALEXAM_GEN_ALL_CORE
VITALS:   T(C): 36.6 (06-01-22 @ 02:46), Max: 37.3 (05-31-22 @ 14:43)  HR: 91 (06-01-22 @ 02:46) (90 - 103)  BP: 148/80 (06-01-22 @ 02:46) (128/69 - 152/78)  RR: 19 (06-01-22 @ 02:46) (17 - 20)  SpO2: 100% (06-01-22 @ 02:46) (96% - 100%)    GENERAL: NAD, lying in bed comfortably  HEAD:  Atraumatic, Normocephalic  EYES: EOMI, PERRLA, conjunctiva and sclera clear  ENT: Moist mucous membranes  NECK: Supple, No JVD  CHEST/LUNG: Clear to auscultation bilaterally;  Unlabored respirations  HEART: Regular rate and rhythm; No murmurs, rubs, or gallops  ABDOMEN: BSx4; Soft, nontender, nondistended  EXTREMITIES:  4+ pitting edema with + tenderness .  NERVOUS SYSTEM:  A&Ox3, no focal deficits   SKIN: Multiple areas of redness with superfical scabs on b/l LE.

## 2022-06-01 NOTE — H&P ADULT - PROBLEM SELECTOR PLAN 5
- Pt with hx of anemia and currently hgb 8.3 Microcytic  - Unsure baseline hgb   - Pt denies any bleeding GI ,  or otherwise.   - Monitor for bleeding considering pt being started on AC

## 2022-06-01 NOTE — H&P ADULT - ASSESSMENT
77 yo F, pmhx DM, DVT (not on AC), hypothyroid, psoriasis, asthma, vitiligo, hypothyroidism, HTN, presenting to the ED w/ bl LE swelling and erythema x 5 days. US with b/l DVT. admit for DVT and PE r/o

## 2022-06-01 NOTE — PATIENT PROFILE ADULT - FLU SEASON?
----- Message from Nataliia Degroot on behalf of Caleb Degroot sent at 4/30/2021  8:33 AM CDT -----  Regarding: Lab Test or Test Related Question  Contact: [phone]  This message is being sent by Nataliia Degroot on behalf of Caleb GRIMES Mikayla.    Good morning,  Lately, Caleb has seemed to have a negative reaction to excessive sugar. He gets headaches and diarrhea. In the past few days, he's been excessively thirsty. I'm wondering if he should have a blood test? We are both off today so if you think we should do that, we could come in today. I'm assuming it would be a fasting test and he is still in bed so he hasn't eaten anything as of yet :-)    Please let me know if you think that is necessary.    Thank you!  Nataliia  
Office visit made for later today with PCP.    
Yes...

## 2022-06-01 NOTE — CHART NOTE - NSCHARTNOTEFT_GEN_A_CORE
seen and examined  c/w AC, PE on CTA  PT eval  Likely change to doac in AM      Vital Signs Last 24 Hrs  T(C): 36.6 (01 Jun 2022 11:45), Max: 37.3 (31 May 2022 14:43)  T(F): 97.8 (01 Jun 2022 11:45), Max: 99.2 (31 May 2022 14:43)  HR: 90 (01 Jun 2022 11:45) (86 - 103)  BP: 145/74 (01 Jun 2022 11:45) (128/69 - 152/78)  BP(mean): --  RR: 18 (01 Jun 2022 11:45) (17 - 20)  SpO2: 97% (01 Jun 2022 11:45) (95% - 100%)                            8.3    12.81 )-----------( 270      ( 31 May 2022 18:08 )             28.9     05-31    136  |  104  |  12  ----------------------------<  103<H>  4.3   |  24  |  1.00    Ca    10.1      31 May 2022 18:08  Mg     2.2     05-31    TPro  8.9<H>  /  Alb  3.4  /  TBili  0.5  /  DBili  x   /  AST  23  /  ALT  13  /  AlkPhos  152<H>  05-31

## 2022-06-02 LAB
ANION GAP SERPL CALC-SCNC: 8 MMOL/L — SIGNIFICANT CHANGE UP (ref 5–17)
BASOPHILS # BLD AUTO: 0.09 K/UL — SIGNIFICANT CHANGE UP (ref 0–0.2)
BASOPHILS NFR BLD AUTO: 1 % — SIGNIFICANT CHANGE UP (ref 0–2)
BUN SERPL-MCNC: 16 MG/DL — SIGNIFICANT CHANGE UP (ref 7–23)
CALCIUM SERPL-MCNC: 9.2 MG/DL — SIGNIFICANT CHANGE UP (ref 8.5–10.1)
CHLORIDE SERPL-SCNC: 104 MMOL/L — SIGNIFICANT CHANGE UP (ref 96–108)
CO2 SERPL-SCNC: 27 MMOL/L — SIGNIFICANT CHANGE UP (ref 22–31)
CREAT SERPL-MCNC: 1.02 MG/DL — SIGNIFICANT CHANGE UP (ref 0.5–1.3)
EGFR: 58 ML/MIN/1.73M2 — LOW
EOSINOPHIL # BLD AUTO: 0.67 K/UL — HIGH (ref 0–0.5)
EOSINOPHIL NFR BLD AUTO: 7.1 % — HIGH (ref 0–6)
GLUCOSE SERPL-MCNC: 95 MG/DL — SIGNIFICANT CHANGE UP (ref 70–99)
HCT VFR BLD CALC: 26.9 % — LOW (ref 34.5–45)
HGB BLD-MCNC: 7.5 G/DL — LOW (ref 11.5–15.5)
IMM GRANULOCYTES NFR BLD AUTO: 0.4 % — SIGNIFICANT CHANGE UP (ref 0–1.5)
LYMPHOCYTES # BLD AUTO: 2.72 K/UL — SIGNIFICANT CHANGE UP (ref 1–3.3)
LYMPHOCYTES # BLD AUTO: 28.9 % — SIGNIFICANT CHANGE UP (ref 13–44)
MCHC RBC-ENTMCNC: 19.6 PG — LOW (ref 27–34)
MCHC RBC-ENTMCNC: 27.9 G/DL — LOW (ref 32–36)
MCV RBC AUTO: 70.2 FL — LOW (ref 80–100)
MONOCYTES # BLD AUTO: 0.71 K/UL — SIGNIFICANT CHANGE UP (ref 0–0.9)
MONOCYTES NFR BLD AUTO: 7.5 % — SIGNIFICANT CHANGE UP (ref 2–14)
NEUTROPHILS # BLD AUTO: 5.18 K/UL — SIGNIFICANT CHANGE UP (ref 1.8–7.4)
NEUTROPHILS NFR BLD AUTO: 55.1 % — SIGNIFICANT CHANGE UP (ref 43–77)
NRBC # BLD: 0 /100 WBCS — SIGNIFICANT CHANGE UP (ref 0–0)
PLATELET # BLD AUTO: 438 K/UL — HIGH (ref 150–400)
POTASSIUM SERPL-MCNC: 3.9 MMOL/L — SIGNIFICANT CHANGE UP (ref 3.5–5.3)
POTASSIUM SERPL-SCNC: 3.9 MMOL/L — SIGNIFICANT CHANGE UP (ref 3.5–5.3)
RBC # BLD: 3.83 M/UL — SIGNIFICANT CHANGE UP (ref 3.8–5.2)
RBC # FLD: 20 % — HIGH (ref 10.3–14.5)
SODIUM SERPL-SCNC: 139 MMOL/L — SIGNIFICANT CHANGE UP (ref 135–145)
WBC # BLD: 9.41 K/UL — SIGNIFICANT CHANGE UP (ref 3.8–10.5)
WBC # FLD AUTO: 9.41 K/UL — SIGNIFICANT CHANGE UP (ref 3.8–10.5)

## 2022-06-02 PROCEDURE — 99233 SBSQ HOSP IP/OBS HIGH 50: CPT

## 2022-06-02 RX ADMIN — PANTOPRAZOLE SODIUM 40 MILLIGRAM(S): 20 TABLET, DELAYED RELEASE ORAL at 07:09

## 2022-06-02 RX ADMIN — GABAPENTIN 300 MILLIGRAM(S): 400 CAPSULE ORAL at 05:12

## 2022-06-02 RX ADMIN — GABAPENTIN 300 MILLIGRAM(S): 400 CAPSULE ORAL at 17:15

## 2022-06-02 RX ADMIN — SIMVASTATIN 20 MILLIGRAM(S): 20 TABLET, FILM COATED ORAL at 22:18

## 2022-06-02 RX ADMIN — Medication 500 MILLIGRAM(S): at 17:15

## 2022-06-02 RX ADMIN — Medication 325 MILLIGRAM(S): at 11:23

## 2022-06-02 RX ADMIN — ENOXAPARIN SODIUM 90 MILLIGRAM(S): 100 INJECTION SUBCUTANEOUS at 08:04

## 2022-06-02 RX ADMIN — Medication 500 MILLIGRAM(S): at 05:12

## 2022-06-02 RX ADMIN — Medication 150 MICROGRAM(S): at 05:13

## 2022-06-02 RX ADMIN — ENOXAPARIN SODIUM 90 MILLIGRAM(S): 100 INJECTION SUBCUTANEOUS at 22:18

## 2022-06-02 NOTE — PROGRESS NOTE ADULT - ASSESSMENT
75 yo F, pmhx DM, DVT (not on AC), hypothyroid, psoriasis, asthma, vitiligo, hypothyroidism, HTN, presenting to the ED w/ bl LE swelling and erythema x 5 days. US with b/l DVT. admit for DVT and PE r/o

## 2022-06-02 NOTE — PROGRESS NOTE ADULT - PROBLEM SELECTOR PLAN 5
- Pt with hx of anemia    - Unsure baseline hgb   - Pt denies any bleeding GI ,  or otherwise.   - Monitor for bleeding considering pt being started on AC  Send iron studies   no s/s of bleeding at this time

## 2022-06-02 NOTE — PROGRESS NOTE ADULT - SUBJECTIVE AND OBJECTIVE BOX
Patient is a 76y old  Female who presents with a chief complaint of b/l DVT (01 Jun 2022 03:20)    INTERVAL HPI/OVERNIGHT EVENTS:    MEDICATIONS  (STANDING):  cephalexin 500 milliGRAM(s) Oral every 12 hours  enoxaparin Injectable 90 milliGRAM(s) SubCutaneous every 12 hours  ferrous    sulfate 325 milliGRAM(s) Oral daily  gabapentin 300 milliGRAM(s) Oral two times a day  levothyroxine 150 MICROGram(s) Oral daily  pantoprazole    Tablet 40 milliGRAM(s) Oral before breakfast  simvastatin 20 milliGRAM(s) Oral at bedtime    MEDICATIONS  (PRN):  acetaminophen     Tablet .. 650 milliGRAM(s) Oral every 6 hours PRN Temp greater or equal to 38C (100.4F), Mild Pain (1 - 3)  ondansetron Injectable 4 milliGRAM(s) IV Push every 8 hours PRN Nausea and/or Vomiting    Allergies    Apple Juice (Rash)  Carrots (Rash)  No Known Drug Allergies  pork (Rash)    Intolerances      REVIEW OF SYSTEMS:  All other systems reviewed and are negative    Vital Signs Last 24 Hrs  T(C): 36.8 (02 Jun 2022 09:55), Max: 37.1 (01 Jun 2022 17:23)  T(F): 98.3 (02 Jun 2022 09:55), Max: 98.8 (02 Jun 2022 05:29)  HR: 78 (02 Jun 2022 09:55) (72 - 85)  BP: 119/73 (02 Jun 2022 09:55) (116/72 - 128/77)  BP(mean): --  RR: 18 (02 Jun 2022 09:55) (18 - 18)  SpO2: 95% (02 Jun 2022 09:55) (95% - 97%)  Daily     Daily   I&O's Summary    CAPILLARY BLOOD GLUCOSE        PHYSICAL EXAM:  GENERAL: NAD,    HEAD:  Atraumatic, Normocephalic  EYES: EOMI, PERRLA, conjunctiva and sclera clear  ENMT: No tonsillar erythema, exudates, or enlargement; Moist mucous membranes, Good dentition, No lesions  NECK: Supple, No JVD, Normal thyroid  NERVOUS SYSTEM:  Alert & Oriented X3, Good concentration; Motor Strength 5/5 B/L upper and lower extremities; DTRs 2+ intact and symmetric  CHEST/LUNG: Clear to percussion bilaterally; No rales, rhonchi, wheezing, or rubs  HEART: Regular rate and rhythm; No murmurs, rubs, or gallops  ABDOMEN: Soft, Nontender, Nondistended; Bowel sounds present  EXTREMITIES:  2+ Peripheral Pulses, No clubbing, cyanosis, or edema  LYMPH: No lymphadenopathy noted  SKIN: No rashes or lesions    Labs                          7.5    9.41  )-----------( 438      ( 02 Jun 2022 08:18 )             26.9     06-02    139  |  104  |  16  ----------------------------<  95  3.9   |  27  |  1.02    Ca    9.2      02 Jun 2022 08:18  Mg     2.2     05-31    TPro  8.9<H>  /  Alb  3.4  /  TBili  0.5  /  DBili  x   /  AST  23  /  ALT  13  /  AlkPhos  152<H>  05-31                        DVT prophylaxis: > Lovenox 40mg SQ daily  > Heparin   > SCD's

## 2022-06-02 NOTE — PHYSICAL THERAPY INITIAL EVALUATION ADULT - LEVEL OF INDEPENDENCE: SCOOT/BRIDGE, REHAB EVAL
Boy Monika Hallisch is a 0 day old male 8 lb 8 oz (3856 g) infant, delivered at Gestational Age: 39w0d on 2015.    MATERNAL INFORMATION:     Age, /Para, estimated date of delivery:   Information for the patient's mother:  Hallisch, Monika [[id number]]   21 year old         2017, by Last Menstrual Period      steroids during pregnancy:       Prenatal Labs:  Information for the patient's mother:  Hallisch, Monika [[id number]]     CULTURE   Date Value Ref Range Status   2017   Final    NEGATIVE FOR NEISSERIA GONORRHOEAE, YEAST AND STREPTOCOCCUS AGALACTIAE (STREP GROUP B)   2017   Final    Detection of Neisseria gonorrhoeae by culture is suboptimal and detection by amplified nucleic acid methods is more sensitive. Order CGPT if culture is negative and N. gonorrhoeae is suspected.     HIV1/HIV2   Date Value Ref Range Status   2012 NONREACTIVE NONREACTIVE Final     HIV Antigen/Antibody Screen   Date Value Ref Range Status   2016 NONREACTIVE NONREACTIVE Final     HEP B Surface AG   Date Value Ref Range Status   2016 NEGATIVE NEGATIVE Final     ABO/RH(D)   Date Value Ref Range Status   2017 A NEGATIVE  Final     RPR Screen   Date Value Ref Range Status   2012 NON REACTIVE NONREACTIVE Final     Neisseria Gonorrhoeae by Nucleic Acid Amplification   Date Value Ref Range Status   10/16/2016 NEGATIVE NEGATIVE Final     Chlamydia Trachomatis by Nucleic Acid Amplification   Date Value Ref Range Status   10/16/2016 NEGATIVE NEGATIVE Final     Rubella Antibody IgG   Date Value Ref Range Status   2016 145.7 >9.9 Units/mL Final     Comment:     <5.0 Units/mL = Negative for IgG antibodies (Non Immune)  5.0 to 9.9 Units/mL = Equivocal (Non Immune)  >9.9 Units/mL = Immune     Result does not represent an antibody titer.          Maternal History:  Information for the patient's mother:  Hallisch, Monika [[id number]]     Social History   Substance Use Topics   • Smoking  status: Never Smoker   • Smokeless tobacco: Never Used   • Alcohol use No     Information for the patient's mother:  Hallisch, Monika [[id number]]     Past Medical History:   Diagnosis Date   • Anxiety    • Depression    • Hypothyroidism    • Rh negative, maternal    • STD (female) 2013    Chlamydia   • Thyroid nodule        BIRTH HISTORY:     Delivery method: Vaginal, Vacuum (Extractor) [254]   Rupture date & time: 2017 9:03 PM   Date & time of birth 2015 2:02 AM   Induction:       Labor complications: None     Placenta appearance: Intact   Cord info/complications: 3 Vessels None       Delayed cord clamping: Yes   Indications for :     Presentation/position: Vertex Left Occiput Anterior   Forceps attempted? No     Vacuum attempted? Yes Fetal Heart Rate or Rhythm Abnormality   Shoulder dystocia? No                          INFORMATION   Resuscitation:  None  Observed anomalies:           APGARS  One minute Five minutes   Skin color: 0  1    Heart rate: 2  2     Reflex: 2  2    Muscle tone: 2  2    Breathin  2    Totals:   8  9      Birth Measurements:  Weight: 8 lb 8 oz (3856 g)  Length: 20\"  Head circumference: 33.5 cm     Labs:  Cord Blood Evaluation:    Recent Labs  Lab 17  020   ABR O POSITIVE   RHEVN MOTHER RH IG CANDIDATE   DATANTIIGG NEGATIVE     Blood gases sent? Yes   Cord pH     Recent Labs  Lab 17  020   ACPH 7.21       Bilirubin No results found for: BILIRUBIN   TCB Result:    TCB Site:          Screenings/ Immunizations:  [place]  screen: will be drawn after 24 hours of age per statute, results pending  Congenital heart disease (CHD) screening complete:     Right hand reading %:     Foot reading %:     CHD:    Hearing exam:    Immunizations:   Most Recent Immunizations   Administered Date(s) Administered   • None   Pended Date(s) Pended   • Hepatitis B Child 2017   • Hepatitis B Immune Globulin 2017       PHYSICAL EXAM   VITALS:   Visit  Vitals   • BP 79/36   • Pulse 130   • Temp 98.3 °F (36.8 °C)   • Resp 50   • Ht 20\" (50.8 cm)  Comment: Filed from Delivery Summary   • Wt 3856 g  Comment: Filed from Delivery Summary   • HC 33.5 cm (13.19\")  Comment: Filed from Delivery Summary   • BMI 14.94 kg/m2     Intake/Output     None        Weight change since birth: 0%  GENERAL: Baby Boy is an alert, vigorous male with appropriate behavior. He is in no acute distress.  SKIN: His skin is warm with normal turgor. The color of the skin is pink. There is no rash. There are no bruises or other signs of injury. Significant jaundice is not present.  HEAD: The head has occipital swelling with a flat right parietal occipital region.  No cephalohematomas noted.  The anterior fontanel is open and flat.  EYES: The conjunctivae appear normal with neither icterus nor subconjunctival hemorrhage.   Red reflexes are seen bilaterally.  EARS: Pinnae normal.  NOSE: There is no nasal flaring, nares patent bilaterally.  THROAT:  The oropharynx is normal.  There is no cleft of the palate.  NECK: Clavicles without crepitus.  TRUNK AND THORAX: There are no lesions on the trunk; there is no dimple over the presacral area. There are no retractions.  LUNGS: The lung fields are clear to auscultation.  HEART: The precordium is quiet. The heart rhythm is grossly regular. S1 and S2 are normal. There are no murmurs. Normal femoral pulses.  ABDOMEN: The umbilical cord stump is normal. There is not an umbilical hernia. The abdomen is flat and soft.   GENITALIA: normal male genitalia with bilateral descended testes  RECTAL: Anus patent.  EXTREMITIES: Moving all 4 extremities. The hip exam is normal. There are no hip clicks or clunks.    NEUROLOGIC: He displays normal tone throughout. He is not jittery.      ASSESSMENT   Well 0 day old male infant.    There is no problem list on file for this patient.      PLAN   Feeding: Calvert is currently being fed Breast milk only.  I am aware that  mother's feeding choice upon admission was the following:   Information for the patient's mother:  Hallisch, Monika [[id number]]   Exclusive breast milk feeding  There are no medical contraindications to exclusive breast milk feeding, and the benefits of exclusively breastfeeding were discussed/reinforced with the mother.      Signed by: Annette Hopkins MD   2015   independent

## 2022-06-02 NOTE — PHYSICAL THERAPY INITIAL EVALUATION ADULT - ADDITIONAL COMMENTS
lives in house with 5 steps and bilateral rails. 15 steps and unilateral rial to second floor bedroom.

## 2022-06-03 ENCOUNTER — TRANSCRIPTION ENCOUNTER (OUTPATIENT)
Age: 77
End: 2022-06-03

## 2022-06-03 VITALS
DIASTOLIC BLOOD PRESSURE: 76 MMHG | HEART RATE: 74 BPM | SYSTOLIC BLOOD PRESSURE: 146 MMHG | OXYGEN SATURATION: 95 % | TEMPERATURE: 100 F | RESPIRATION RATE: 18 BRPM

## 2022-06-03 LAB
FERRITIN SERPL-MCNC: 8 NG/ML — LOW (ref 15–150)
HCT VFR BLD CALC: 26.3 % — LOW (ref 34.5–45)
HGB BLD-MCNC: 7.6 G/DL — LOW (ref 11.5–15.5)
IRON SATN MFR SERPL: 16 UG/DL — LOW (ref 30–160)
IRON SATN MFR SERPL: 4 % — LOW (ref 14–50)
MCHC RBC-ENTMCNC: 20.4 PG — LOW (ref 27–34)
MCHC RBC-ENTMCNC: 28.9 G/DL — LOW (ref 32–36)
MCV RBC AUTO: 70.7 FL — LOW (ref 80–100)
NRBC # BLD: 0 /100 WBCS — SIGNIFICANT CHANGE UP (ref 0–0)
PLATELET # BLD AUTO: 406 K/UL — HIGH (ref 150–400)
RBC # BLD: 3.72 M/UL — LOW (ref 3.8–5.2)
RBC # FLD: 20 % — HIGH (ref 10.3–14.5)
TIBC SERPL-MCNC: 420 UG/DL — SIGNIFICANT CHANGE UP (ref 220–430)
UIBC SERPL-MCNC: 404 UG/DL — HIGH (ref 110–370)
VIT B12 SERPL-MCNC: 501 PG/ML — SIGNIFICANT CHANGE UP (ref 232–1245)
WBC # BLD: 9.92 K/UL — SIGNIFICANT CHANGE UP (ref 3.8–10.5)
WBC # FLD AUTO: 9.92 K/UL — SIGNIFICANT CHANGE UP (ref 3.8–10.5)

## 2022-06-03 PROCEDURE — 99239 HOSP IP/OBS DSCHRG MGMT >30: CPT

## 2022-06-03 RX ORDER — APIXABAN 2.5 MG/1
1 TABLET, FILM COATED ORAL
Qty: 60 | Refills: 0
Start: 2022-06-03 | End: 2022-07-02

## 2022-06-03 RX ORDER — APIXABAN 2.5 MG/1
2 TABLET, FILM COATED ORAL
Qty: 28 | Refills: 0
Start: 2022-06-03 | End: 2022-06-09

## 2022-06-03 RX ORDER — APIXABAN 2.5 MG/1
10 TABLET, FILM COATED ORAL EVERY 12 HOURS
Refills: 0 | Status: DISCONTINUED | OUTPATIENT
Start: 2022-06-03 | End: 2022-06-03

## 2022-06-03 RX ADMIN — Medication 500 MILLIGRAM(S): at 06:29

## 2022-06-03 RX ADMIN — PANTOPRAZOLE SODIUM 40 MILLIGRAM(S): 20 TABLET, DELAYED RELEASE ORAL at 06:29

## 2022-06-03 RX ADMIN — Medication 325 MILLIGRAM(S): at 11:37

## 2022-06-03 RX ADMIN — Medication 650 MILLIGRAM(S): at 07:50

## 2022-06-03 RX ADMIN — GABAPENTIN 300 MILLIGRAM(S): 400 CAPSULE ORAL at 17:09

## 2022-06-03 RX ADMIN — Medication 150 MICROGRAM(S): at 06:29

## 2022-06-03 RX ADMIN — Medication 650 MILLIGRAM(S): at 08:37

## 2022-06-03 RX ADMIN — GABAPENTIN 300 MILLIGRAM(S): 400 CAPSULE ORAL at 06:28

## 2022-06-03 RX ADMIN — ENOXAPARIN SODIUM 90 MILLIGRAM(S): 100 INJECTION SUBCUTANEOUS at 07:45

## 2022-06-03 RX ADMIN — APIXABAN 10 MILLIGRAM(S): 2.5 TABLET, FILM COATED ORAL at 17:09

## 2022-06-03 NOTE — DISCHARGE NOTE NURSING/CASE MANAGEMENT/SOCIAL WORK - PATIENT PORTAL LINK FT
You can access the FollowMyHealth Patient Portal offered by [facility] by registering at the following website: [email or web address]. By joining [facility]’s FollowMyHealth portal, you will also be able to view your health information using other applications (apps) compatible with our system.

## 2022-06-03 NOTE — DISCHARGE NOTE NURSING/CASE MANAGEMENT/SOCIAL WORK - NSDCPEFALRISK_GEN_ALL_CORE
For information on Fall & Injury Prevention, visit: [email or web address] OR  [email or web address] OR  https://www.cdc.gov/steadi/patient.html

## 2022-06-03 NOTE — DISCHARGE NOTE PROVIDER - HOSPITAL COURSE
77 yo F, pmhx DM, DVT (not on AC), hypothyroid, psoriasis, asthma, vitiligo, hypothyroidism, HTN, presenting to the ED w/ bl LE swelling and erythema x 5 days. US with b/l DVT. admit for DVT and PE r/o    Problem/Plan - 1:  ·  Problem: DVT of lower limb, acute.   ·  Plan: - New B/l LE DVT  and PE   - change to eliquis .  keflex stopped no signs of cellulitis     Problem/Plan - 2:  ·  Problem: Diabetes.   ·  Plan: glucose stable.    Problem/Plan - 3:  ·  Problem: Hypothyroid.   ·  Plan: Syntrhoid 150.    Problem/Plan - 4:  ·  Problem: Essential hypertension.   ·  Plan: c/w home meds.    Problem/Plan - 5:  ·  Problem: Anemia.   ·  Plan: - Pt with hx of anemia    - had hx of b12 def, anemia microcytic now, discussed w patient, shes follow with her pcp for anemia, says she has appointment coming up next week   - Pt denies any bleeding GI ,  or otherwise.       iron studies /b12 sent will follow w her pcp  no s/s of bleeding at this time.        pt seen and examined 45 min spent on dc planning     Lab test review, Radiology Review, Vitals review, Consultant review and discussion, Physical examination, IDR, Assessment and plan; Plan discussion with patient and family

## 2022-06-03 NOTE — DISCHARGE NOTE PROVIDER - NSDCCPCAREPLAN_GEN_ALL_CORE_FT
PRINCIPAL DISCHARGE DIAGNOSIS  Diagnosis: DVT, lower extremity  Assessment and Plan of Treatment: continue eliquis  follow up with your primary care doctor   monitor your anemia with your doctor      SECONDARY DISCHARGE DIAGNOSES  Diagnosis: Cellulitis  Assessment and Plan of Treatment: resolved

## 2022-06-03 NOTE — DISCHARGE NOTE PROVIDER - NSDCMRMEDTOKEN_GEN_ALL_CORE_FT
Advair Diskus 500 mcg-50 mcg inhalation powder: 1 puff(s) inhaled 2 times a day  albuterol 90 mcg/inh inhalation aerosol: 2 puff(s) inhaled every 6 hours, As needed, SOB  aluminum hydroxide-magnesium hydroxide 200 mg-200 mg/5 mL oral suspension: 30 milliliter(s) orally every 6 hours, As needed, Dyspepsia  dicyclomine 20 mg oral tablet: 1 tab(s) orally 2 times a day (before meals)  Eliquis 5 mg oral tablet: 2 tab(s) orally 2 times a day   Eliquis 5 mg oral tablet: 1 tab(s) orally 2 times a day   ferrous sulfate 325 mg (65 mg elemental iron) oral tablet: 1 tab(s) orally once a day  folic acid 1 mg oral tablet: 1 tab(s) orally once a day  gabapentin 300 mg oral capsule: 1 cap(s) orally 2 times a day  levothyroxine 150 mcg (0.15 mg) oral tablet: 1 tab(s) orally once a day  NIFEdipine 30 mg oral tablet, extended release: 1 tab(s) orally once a day  pantoprazole 40 mg oral delayed release tablet: 1 tab(s) orally once a day (before a meal)  simvastatin 20 mg oral tablet: 1 tab(s) orally once a day (at bedtime)  triamcinolone 0.1% topical cream: 1 application topically 2 times a day

## 2022-06-09 DIAGNOSIS — I82.403 ACUTE EMBOLISM AND THROMBOSIS OF UNSPECIFIED DEEP VEINS OF LOWER EXTREMITY, BILATERAL: ICD-10-CM

## 2022-06-09 DIAGNOSIS — I25.2 OLD MYOCARDIAL INFARCTION: ICD-10-CM

## 2022-06-09 DIAGNOSIS — L80 VITILIGO: ICD-10-CM

## 2022-06-09 DIAGNOSIS — Z86.718 PERSONAL HISTORY OF OTHER VENOUS THROMBOSIS AND EMBOLISM: ICD-10-CM

## 2022-06-09 DIAGNOSIS — L40.9 PSORIASIS, UNSPECIFIED: ICD-10-CM

## 2022-06-09 DIAGNOSIS — J45.909 UNSPECIFIED ASTHMA, UNCOMPLICATED: ICD-10-CM

## 2022-06-09 DIAGNOSIS — E11.9 TYPE 2 DIABETES MELLITUS WITHOUT COMPLICATIONS: ICD-10-CM

## 2022-06-09 DIAGNOSIS — Z91.018 ALLERGY TO OTHER FOODS: ICD-10-CM

## 2022-06-09 DIAGNOSIS — I10 ESSENTIAL (PRIMARY) HYPERTENSION: ICD-10-CM

## 2022-06-09 DIAGNOSIS — D50.9 IRON DEFICIENCY ANEMIA, UNSPECIFIED: ICD-10-CM

## 2022-06-09 DIAGNOSIS — E03.9 HYPOTHYROIDISM, UNSPECIFIED: ICD-10-CM

## 2022-06-09 DIAGNOSIS — I26.99 OTHER PULMONARY EMBOLISM WITHOUT ACUTE COR PULMONALE: ICD-10-CM

## 2022-08-26 ENCOUNTER — EMERGENCY (EMERGENCY)
Facility: HOSPITAL | Age: 77
LOS: 1 days | Discharge: HOME | End: 2022-08-26
Attending: EMERGENCY MEDICINE | Admitting: EMERGENCY MEDICINE

## 2022-08-26 VITALS
OXYGEN SATURATION: 98 % | TEMPERATURE: 98 F | DIASTOLIC BLOOD PRESSURE: 77 MMHG | HEART RATE: 79 BPM | SYSTOLIC BLOOD PRESSURE: 153 MMHG | RESPIRATION RATE: 16 BRPM | HEIGHT: 62 IN

## 2022-08-26 VITALS
TEMPERATURE: 98 F | SYSTOLIC BLOOD PRESSURE: 167 MMHG | OXYGEN SATURATION: 100 % | RESPIRATION RATE: 15 BRPM | DIASTOLIC BLOOD PRESSURE: 96 MMHG | HEART RATE: 75 BPM

## 2022-08-26 DIAGNOSIS — Z98.890 OTHER SPECIFIED POSTPROCEDURAL STATES: Chronic | ICD-10-CM

## 2022-08-26 LAB
ALBUMIN SERPL ELPH-MCNC: 3.6 G/DL — SIGNIFICANT CHANGE UP (ref 3.3–5)
ALP SERPL-CCNC: 133 U/L — HIGH (ref 40–120)
ALT FLD-CCNC: 10 U/L — SIGNIFICANT CHANGE UP (ref 4–33)
ANION GAP SERPL CALC-SCNC: 12 MMOL/L — SIGNIFICANT CHANGE UP (ref 7–14)
AST SERPL-CCNC: 25 U/L — SIGNIFICANT CHANGE UP (ref 4–32)
BASOPHILS # BLD AUTO: 0.46 K/UL — HIGH (ref 0–0.2)
BASOPHILS NFR BLD AUTO: 3.5 % — HIGH (ref 0–2)
BILIRUB SERPL-MCNC: 0.4 MG/DL — SIGNIFICANT CHANGE UP (ref 0.2–1.2)
BUN SERPL-MCNC: 18 MG/DL — SIGNIFICANT CHANGE UP (ref 7–23)
CALCIUM SERPL-MCNC: 9.9 MG/DL — SIGNIFICANT CHANGE UP (ref 8.4–10.5)
CHLORIDE SERPL-SCNC: 99 MMOL/L — SIGNIFICANT CHANGE UP (ref 98–107)
CO2 SERPL-SCNC: 21 MMOL/L — LOW (ref 22–31)
CREAT SERPL-MCNC: 0.95 MG/DL — SIGNIFICANT CHANGE UP (ref 0.5–1.3)
CRP SERPL-MCNC: 23.4 MG/L — HIGH
EGFR: 62 ML/MIN/1.73M2 — SIGNIFICANT CHANGE UP
EOSINOPHIL # BLD AUTO: 0.58 K/UL — HIGH (ref 0–0.5)
EOSINOPHIL NFR BLD AUTO: 4.4 % — SIGNIFICANT CHANGE UP (ref 0–6)
ERYTHROCYTE [SEDIMENTATION RATE] IN BLOOD: 86 MM/HR — HIGH (ref 4–25)
GLUCOSE SERPL-MCNC: 111 MG/DL — HIGH (ref 70–99)
HCT VFR BLD CALC: 28.7 % — LOW (ref 34.5–45)
HGB BLD-MCNC: 8 G/DL — LOW (ref 11.5–15.5)
IANC: 9.28 K/UL — HIGH (ref 1.8–7.4)
LYMPHOCYTES # BLD AUTO: 2.98 K/UL — SIGNIFICANT CHANGE UP (ref 1–3.3)
LYMPHOCYTES # BLD AUTO: 22.8 % — SIGNIFICANT CHANGE UP (ref 13–44)
MAGNESIUM SERPL-MCNC: 2 MG/DL — SIGNIFICANT CHANGE UP (ref 1.6–2.6)
MCHC RBC-ENTMCNC: 20.1 PG — LOW (ref 27–34)
MCHC RBC-ENTMCNC: 27.9 GM/DL — LOW (ref 32–36)
MCV RBC AUTO: 71.9 FL — LOW (ref 80–100)
MONOCYTES # BLD AUTO: 0.34 K/UL — SIGNIFICANT CHANGE UP (ref 0–0.9)
MONOCYTES NFR BLD AUTO: 2.6 % — SIGNIFICANT CHANGE UP (ref 2–14)
NEUTROPHILS # BLD AUTO: 8.72 K/UL — HIGH (ref 1.8–7.4)
NEUTROPHILS NFR BLD AUTO: 66.7 % — SIGNIFICANT CHANGE UP (ref 43–77)
PHOSPHATE SERPL-MCNC: 2.9 MG/DL — SIGNIFICANT CHANGE UP (ref 2.5–4.5)
PLATELET # BLD AUTO: 474 K/UL — HIGH (ref 150–400)
POTASSIUM SERPL-MCNC: 5.1 MMOL/L — SIGNIFICANT CHANGE UP (ref 3.5–5.3)
POTASSIUM SERPL-SCNC: 5.1 MMOL/L — SIGNIFICANT CHANGE UP (ref 3.5–5.3)
PROT SERPL-MCNC: 8.9 G/DL — HIGH (ref 6–8.3)
RBC # BLD: 3.99 M/UL — SIGNIFICANT CHANGE UP (ref 3.8–5.2)
RBC # FLD: 21.8 % — HIGH (ref 10.3–14.5)
SARS-COV-2 RNA SPEC QL NAA+PROBE: SIGNIFICANT CHANGE UP
SODIUM SERPL-SCNC: 132 MMOL/L — LOW (ref 135–145)
WBC # BLD: 13.07 K/UL — HIGH (ref 3.8–10.5)
WBC # FLD AUTO: 13.07 K/UL — HIGH (ref 3.8–10.5)

## 2022-08-26 PROCEDURE — 73630 X-RAY EXAM OF FOOT: CPT | Mod: 26,LT

## 2022-08-26 PROCEDURE — 99285 EMERGENCY DEPT VISIT HI MDM: CPT

## 2022-08-26 PROCEDURE — 93970 EXTREMITY STUDY: CPT | Mod: 26

## 2022-08-26 RX ORDER — ACETAMINOPHEN 500 MG
975 TABLET ORAL ONCE
Refills: 0 | Status: COMPLETED | OUTPATIENT
Start: 2022-08-26 | End: 2022-08-26

## 2022-08-26 RX ADMIN — Medication 975 MILLIGRAM(S): at 15:19

## 2022-08-26 RX ADMIN — Medication 975 MILLIGRAM(S): at 10:21

## 2022-08-26 NOTE — ED PROVIDER NOTE - ATTENDING CONTRIBUTION TO CARE
Patient is a 76 yo F with history of DM, HTN, hypothyroidism, hx of CAD and MI, recent diagnosis of bilateral DVTs on Eliquis (May 31st 2022) here for worsening leg swelling and pain. Patient reports pain has been steadily worse since she was diagnosed. No chest pain or shortness of breath. No fevers, chills, nausea, vomiting. She reports compliance with Eliquis.     VS noted  Gen. no acute distress, Non toxic   HEENT: EOMI, mmm  Lungs: CTAB/L no C/ W /R   CVS: RRR   Abd; Soft non tender, non distended   Ext: +2 pitting edema bilaterally, left foot: ********************************************  Skin: no rash  Neuro AAOx3 non focal clear speech  a/p: bilateral leg pain and swelling - worsening per patient. Exam c/w edema. Plan for labs, b/l LE doppler.   - Haley SRIVASTAVA Patient is a 76 yo F with history of DM, HTN, hypothyroidism, hx of CAD and MI, recent diagnosis of bilateral DVTs on Eliquis (May 31st 2022) here for worsening leg swelling and pain x 2 days. Patient reports pain has been steadily worse since she was diagnosed. No chest pain or shortness of breath. No fevers, chills, nausea, vomiting. She reports compliance with Eliquis. Patient is also concerned about a growth on her left 1st toe. She states she went to podiatrist, Dr. Victorino Nugent 6 weeks ago and had the nail clipped and since then a bump developed which is not healing.     VS noted  Gen. no acute distress, Non toxic   HEENT: EOMI, mmm  Lungs: CTAB/L no C/ W /R   CVS: RRR   Abd; Soft non tender, non distended   Ext: +2 pitting edema bilaterally, left foot: 2.5 cm growth, mild tenderness, no surrounding erythema, no pus  Skin: no rash  Neuro AAOx3 non focal clear speech  a/p: bilateral leg pain and swelling - worsening per patient. Exam c/w edema. Plan for labs, b/l LE doppler. Plan to discuss toe growth with podiatry.  - Haley SRIVASTAVA

## 2022-08-26 NOTE — ED ADULT NURSE REASSESSMENT NOTE - NS ED NURSE REASSESS COMMENT FT1
Podiatry removed growth to right great toe. Lidocaine 1% injection was injected to adjacent joint. Patient tolerated procedure very well.

## 2022-08-26 NOTE — ED PROVIDER NOTE - NS ED ROS FT
General: no fever, chills  HENT: no nasal congestion, no sore throat  Eyes: no visual changes, no blurred vision  Neck: no neck pain  CV: denies chest pain, no palpitations  Resp: no difficulty breathing, no cough  Abdominal: no nausea, no vomiting, no diarrhea, no abdominal pain  MSK: no muscle aches, +leg pain, +leg swelling  Neuro: no headaches  Skin: no rashes

## 2022-08-26 NOTE — ED PROVIDER NOTE - CLINICAL SUMMARY MEDICAL DECISION MAKING FREE TEXT BOX
77 yo F, pmhx DM, DVT (not on AC), hypothyroid, psoriasis, asthma, vitiligo, hypothyroidism, HTN, presenting to the ED w/ bl LE swelling c/f worsening DVT. PE remarkable for leg swelling/pain, but non erythematous non cellulitic appearing. Labs, US, XR, pods. Dispo pending.

## 2022-08-26 NOTE — CONSULT NOTE ADULT - SUBJECTIVE AND OBJECTIVE BOX
Patient is a 77y old  Female who presents with a chief complaint of Leg Pain    HPI: 75 yo F, pmhx DM, recently DVT on eliquis, hypothyroid, psoriasis, asthma, vitiligo, hypothyroidism, HTN, presenting to the ED w/ persistent bl LE swelling and pain. Patient states that her swelling/pain has never improved, but actually gotten worse. Reports compliance w/ eliquis. Of note, recently admitted x 2 months ago for newly dx DVT above and below knee and placed on eliquis. Patient states she also went to podiatrist because it has been too painful to cut nails. Procedure was performed and patient subsequently developed a bump on her L great toe 1 week later. No fever, no sob, no cp, no other complaints.      PAST MEDICAL & SURGICAL HISTORY:  Essential hypertension      Diabetes  Type 2 diabetes      Hypothyroid      DVT (deep venous thrombosis), right  Bilateral LE DVT      Vitiligo      Psoriasis      Myocardial infarction  approximately 49y/o      Pernicious anemia  B12 150 with elevated MMA, vitiligo   EGD with atrophic gastritis      S/P hernia surgery          MEDICATIONS  (STANDING):    MEDICATIONS  (PRN):      Allergies    Apple Juice (Rash)  Carrots (Rash)  No Known Drug Allergies  pork (Rash)    Intolerances        VITALS:    Vital Signs Last 24 Hrs  T(C): 36.5 (26 Aug 2022 08:51), Max: 36.5 (26 Aug 2022 08:51)  T(F): 97.7 (26 Aug 2022 08:51), Max: 97.7 (26 Aug 2022 08:51)  HR: 79 (26 Aug 2022 08:51) (79 - 79)  BP: 153/77 (26 Aug 2022 08:51) (153/77 - 153/77)  BP(mean): --  RR: 16 (26 Aug 2022 08:51) (16 - 16)  SpO2: 98% (26 Aug 2022 08:51) (98% - 98%)    Parameters below as of 26 Aug 2022 08:51  Patient On (Oxygen Delivery Method): room air        LABS:                          8.0    13.07 )-----------( 474      ( 26 Aug 2022 10:25 )             28.7       08-26    132<L>  |  99  |  18  ----------------------------<  111<H>  5.1   |  21<L>  |  0.95    Ca    9.9      26 Aug 2022 10:25  Phos  2.9     08-26  Mg     2.00     08-26    TPro  8.9<H>  /  Alb  3.6  /  TBili  0.4  /  DBili  x   /  AST  25  /  ALT  10  /  AlkPhos  133<H>  08-26      CAPILLARY BLOOD GLUCOSE      POCT Blood Glucose.: 112 mg/dL (26 Aug 2022 08:56)          LOWER EXTREMITY PHYSICAL EXAM:    Vascular: DP/PT 2/4, B/L, CFT <3 seconds B/L, Temperature gradient warm to cool, B/L.   Neuro: Epicritic sensation intact to the level of toes, B/L.  Musculoskeletal/Ortho: Unremarkable.  Skin: Left foot hallux distal medial nail border granuloma 0.5cmx0.5cm, slight malodor, no pus, no drainage, no erythema, no excess warmth. Right foot plantar forefoot pedicle growth, no wounds, no clinical SOI.    RADIOLOGY & ADDITIONAL STUDIES:     Patient is a 77y old  Female who presents with a chief complaint of Leg Pain    HPI: 77 yo F, pmhx DM, recently DVT on eliquis, hypothyroid, psoriasis, asthma, vitiligo, hypothyroidism, HTN, presenting to the ED w/ persistent bl LE swelling and pain. Patient states that her swelling/pain has never improved, but actually gotten worse. Reports compliance w/ eliquis. Of note, recently admitted x 2 months ago for newly dx DVT above and below knee and placed on eliquis. Patient states she also went to podiatrist because it has been too painful to cut nails. Procedure was performed and patient subsequently developed a bump on her L great toe 1 week later. No fever, no sob, no cp, no other complaints.      PAST MEDICAL & SURGICAL HISTORY:  Essential hypertension      Diabetes  Type 2 diabetes      Hypothyroid      DVT (deep venous thrombosis), right  Bilateral LE DVT      Vitiligo      Psoriasis      Myocardial infarction  approximately 49y/o      Pernicious anemia  B12 150 with elevated MMA, vitiligo   EGD with atrophic gastritis      S/P hernia surgery          MEDICATIONS  (STANDING):    MEDICATIONS  (PRN):      Allergies    Apple Juice (Rash)  Carrots (Rash)  No Known Drug Allergies  pork (Rash)    Intolerances        VITALS:    Vital Signs Last 24 Hrs  T(C): 36.5 (26 Aug 2022 08:51), Max: 36.5 (26 Aug 2022 08:51)  T(F): 97.7 (26 Aug 2022 08:51), Max: 97.7 (26 Aug 2022 08:51)  HR: 79 (26 Aug 2022 08:51) (79 - 79)  BP: 153/77 (26 Aug 2022 08:51) (153/77 - 153/77)  BP(mean): --  RR: 16 (26 Aug 2022 08:51) (16 - 16)  SpO2: 98% (26 Aug 2022 08:51) (98% - 98%)    Parameters below as of 26 Aug 2022 08:51  Patient On (Oxygen Delivery Method): room air        LABS:                          8.0    13.07 )-----------( 474      ( 26 Aug 2022 10:25 )             28.7       08-26    132<L>  |  99  |  18  ----------------------------<  111<H>  5.1   |  21<L>  |  0.95    Ca    9.9      26 Aug 2022 10:25  Phos  2.9     08-26  Mg     2.00     08-26    TPro  8.9<H>  /  Alb  3.6  /  TBili  0.4  /  DBili  x   /  AST  25  /  ALT  10  /  AlkPhos  133<H>  08-26      CAPILLARY BLOOD GLUCOSE      POCT Blood Glucose.: 112 mg/dL (26 Aug 2022 08:56)          LOWER EXTREMITY PHYSICAL EXAM:    Vascular: DP/PT 2/4, B/L, CFT <3 seconds B/L, Temperature gradient warm to cool, B/L.   Neuro: Epicritic sensation intact to the level of toes, B/L.  Musculoskeletal/Ortho: Unremarkable.  Skin: Left foot hallux distal medial nail border granuloma 0.5cmx0.5cm, ingrown hallux medial nail border, mild malodor, scant pus, no drainage, no erythema, no excess warmth. Right foot plantar forefoot pedicle growth, no wounds, no clinical SOI.    RADIOLOGY & ADDITIONAL STUDIES:

## 2022-08-26 NOTE — ED ADULT NURSE NOTE - OBJECTIVE STATEMENT
77y Female  presents to the ER AOX4 ambulatory, complains of bilateral leg swelling an pain for the past 6 week. Patient was admitted to [facility] 6 week ago for DVT then discharged. Upon discharged BLE remained swollen and Eliquis was prescribed. The swelling has not gotten better since then. Now I am having neuropathy pain in both leg. Pitting +2 noted in BLE. Denied any chest pain and SOB.  Blister noted to left great toe. PMHx HTN, hypothyroidism, DM, DVT asthma.   MD seen and examined patient.  Call bell within reach, bed to lowest position. Will continue to monitor

## 2022-08-26 NOTE — ED PROVIDER NOTE - NSFOLLOWUPINSTRUCTIONS_ED_ALL_ED_FT
Leg Edema    WHAT YOU NEED TO KNOW:    Leg edema is swelling caused by fluid buildup. Your legs may swell if you sit or stand for long periods of time, are pregnant, or are injured. Swelling may also occur if you have heart failure or circulation problems. This means that your heart does not pump blood through your body as it should.  Lower Leg Edema         DISCHARGE INSTRUCTIONS:    Call your local emergency number (911 in the [place]) for any of the following:   •You cannot walk.      •You have chest pain or trouble breathing that is worse when you lie down.      •You suddenly feel lightheaded and have trouble breathing.      •You have new and sudden chest pain. You may have more pain when you take deep breaths or cough.      •You cough up blood.      Return to the emergency department if:   •You feel faint or confused.      •Your skin turns blue or gray.      •Your leg feels warm, tender, and painful. It may be swollen and red.      Call your doctor if:   •You have a fever or feel more tired than usual.      •The veins in your legs look larger than usual. They may look full or bulging.      •Your legs itch or feel heavy.      •You have red or white areas or sores on your legs. The skin may also appear dimpled or have indentations.      •You are gaining weight.      •You have trouble moving your ankles.      •The swelling does not go away, or other parts of your body swell.      •You have questions or concerns about your condition or care.      Self-care:   •Elevate your legs. Raise your legs above the level of your heart as often as you can. This will help decrease swelling and pain. Prop your legs on pillows or blankets to keep them elevated comfortably.  Elevate Leg           •Wear pressure stockings, if directed. These tight stockings put pressure on your legs to promote blood flow and prevent blood clots. Put them on before you get out of bed. Wear the stockings during the day. Do not wear them while you sleep.  Pressure Stockings            •Stay active. Do not stand or sit for long periods of time. Ask your healthcare provider about the best exercise plan for you.  Walking for Exercise           •Eat healthy foods. Healthy foods include fruits, vegetables, whole-grain breads, low-fat dairy products, beans, lean meats, and fish. Ask if you need to be on a special diet.  Healthy Foods           •Limit sodium (salt). Salt will make your body hold even more fluid. Your healthcare provider will tell you how many milligrams (mg) of salt you can have each day.             Follow up with your doctor as directed: Write down your questions so you remember to ask them during your visits. Follow up with Dr. Ary Headley ([phone]) within 1 week     Leg Edema    WHAT YOU NEED TO KNOW:    Leg edema is swelling caused by fluid buildup. Your legs may swell if you sit or stand for long periods of time, are pregnant, or are injured. Swelling may also occur if you have heart failure or circulation problems. This means that your heart does not pump blood through your body as it should.  Lower Leg Edema         DISCHARGE INSTRUCTIONS:    Call your local emergency number (911 in the [place]) for any of the following:   •You cannot walk.      •You have chest pain or trouble breathing that is worse when you lie down.      •You suddenly feel lightheaded and have trouble breathing.      •You have new and sudden chest pain. You may have more pain when you take deep breaths or cough.      •You cough up blood.      Return to the emergency department if:   •You feel faint or confused.      •Your skin turns blue or gray.      •Your leg feels warm, tender, and painful. It may be swollen and red.      Call your doctor if:   •You have a fever or feel more tired than usual.      •The veins in your legs look larger than usual. They may look full or bulging.      •Your legs itch or feel heavy.      •You have red or white areas or sores on your legs. The skin may also appear dimpled or have indentations.      •You are gaining weight.      •You have trouble moving your ankles.      •The swelling does not go away, or other parts of your body swell.      •You have questions or concerns about your condition or care.      Self-care:   •Elevate your legs. Raise your legs above the level of your heart as often as you can. This will help decrease swelling and pain. Prop your legs on pillows or blankets to keep them elevated comfortably.  Elevate Leg           •Wear pressure stockings, if directed. These tight stockings put pressure on your legs to promote blood flow and prevent blood clots. Put them on before you get out of bed. Wear the stockings during the day. Do not wear them while you sleep.  Pressure Stockings            •Stay active. Do not stand or sit for long periods of time. Ask your healthcare provider about the best exercise plan for you.  Walking for Exercise           •Eat healthy foods. Healthy foods include fruits, vegetables, whole-grain breads, low-fat dairy products, beans, lean meats, and fish. Ask if you need to be on a special diet.  Healthy Foods           •Limit sodium (salt). Salt will make your body hold even more fluid. Your healthcare provider will tell you how many milligrams (mg) of salt you can have each day.             Follow up with your doctor as directed: Write down your questions so you remember to ask them during your visits.

## 2022-08-26 NOTE — CONSULT NOTE ADULT - ASSESSMENT
76F presents with left foot hallux medial nail border granuloma.  - Pt seen and evaluated.  - Afebrile, WBC 13.07, ESR 86 mm/hr, CRP 2.34g/L.  - Left Foot X-Ray (Resident Wet Read): No gas, no Osteomyelitis.  - Left foot hallux distal medial nail border granuloma 0.5cmx0.5cm, slight malodor, no pus, no drainage, no erythema, no excess warmth. Right foot plantar forefoot pedicle growth, no wounds, no clinical SOI.  - After gaining verbal consent, pt was given 5cc's of 1% lidocaine plain for a left foot hallux block. A #15 blade and sterile suture removal kit was used for excisional debridement of the left foot medial nail border granuloma down to but not including the subq layer. The area was copiously irrigated with sterile saline and dressed with bacitracin and DSD.  - The left foot hallux granuloma specimen was sent to pathology.   - Pt is stable for discharge from the podiatry standpoint.  - Pt can followup with Dr. Ary Headley ([phone]) within 1 week.  - Discussed with attending. 76F presents with left foot hallux medial nail border granuloma, s/p left foot hallux partial nail avulsion of the medial border today (8/26).  - Pt seen and evaluated.  - Afebrile, WBC 13.07, ESR 86 mm/hr, CRP 2.34g/L.  - Left Foot X-Ray (Resident Wet Read): No gas, no Osteomyelitis.  - Left foot hallux distal medial nail border granuloma 0.5cmx0.5cm, ingrown hallux medial nail border, mild malodor, scant pus, no drainage, no erythema, no excess warmth. Right foot plantar forefoot pedicle growth, no wounds, no clinical SOI.  - After gaining verbal consent, pt was given 5cc's of 1% lidocaine plain for a left foot hallux block. A #15 blade and sterile suture removal kit was used for a partial nail avulsion of the left foot medial nail border and excisional debridement of the left foot medial nail border granuloma down to but not including the subq layer. The area was copiously irrigated with sterile saline and dressed with bacitracin and DSD.  - Left foot hallux partial nail avulsion site was cultured.  - The left foot hallux granuloma specimen was sent to pathology.   - Pt is stable for discharge from the podiatry standpoint.  - Pt can followup with Dr. Ary Headley ([phone]) within 1 week.  - Discussed with attending.

## 2022-08-26 NOTE — ED PROVIDER NOTE - PATIENT PORTAL LINK FT
You can access the FollowMyHealth Patient Portal offered by [facility] by registering at the following website: [email or web address]. By joining [employer]’s FollowMyHealth portal, you will also be able to view your health information using other applications (apps) compatible with our system.

## 2022-08-26 NOTE — ED PROVIDER NOTE - PROGRESS NOTE DETAILS
Aaron PGY4: Labs/imaging non actionable. Pods evaluated and sent bx of 1st great toe lesion to path, determined stable for dc, and rec augmentin bid x 7 days. Results communicated to patient. Clinically improved, vss, will dc w/ strict return precautions.

## 2022-11-21 NOTE — DISCHARGE NOTE PROVIDER - HOSPITAL COURSE
Patient is a 75 year old woman w/ past medical history of DM, DVT Eliquis presenting w/ one week of epigastric pain. Patient states pain radiates to her RUQ and LUQ, patient w/o lower abdominal pain. CT abdomen and US abdomen w/o acute findings, calcification of gall bladder. Patient reports 40lb weight loss over a one month time span due to inability to tolerate PO. Admitted for epigastric pain of unclear etiology differential includes, PUD vs GERD vs gastritis vs ACS (unlikely troponin negative x 2 w/ recent normal stress test) vs biliary pathology vs gastroparesis now improved no plan for repeat EGD         Problem/Plan - 1:    ·  Problem: Epigastric pain.  Plan: - improve this morning on full liquid diet, can advance to     - Patient complaining of epigastric pain for one week inhibiting eating. Endorsing 40lb weight loss over past month, endorses no appetite. Differential includes gastroporesis vs malabsoprtion in setting of known atrophic gastritis, pending CT chest to r/o malignancy.     - Appreciate GI reccs, no indication for repeat EGD, c/w simethicone, dicyclomine 20 mg q12 hr     - tylenol as needed for pain    - PPI.          Problem/Plan - 2:    ·  Problem: Dizziness.  Plan: - resolved    - PT eval home w/ no needs.          Problem/Plan - 3:    ·  Problem: Psoriasis.  Plan: Patient w/ psoriasis not on medication per patient    - triamcinolone cream BID     - loratidine for itching, takes Allegra at home.          Problem/Plan - 4:    ·  Problem: Vitiligo.  Plan: Patient w/ vitiligo, stable.          Problem/Plan - 5:    ·  Problem: Diabetes.  Plan: Patient w/ diabetes on metformin     - ISS.          Problem/Plan - 6:    Problem: Essential hypertension. Plan: Patient w/ htn     - c/w home nifedipine.         Problem/Plan - 7:    ·  Problem: Hypothyroid.  Plan: Patient w/ hypothyroidism on synthroid     - TSH in AM     - c/w home synthroid 150 mcg.          Problem/Plan - 8:    ·  Problem: DVT (deep venous thrombosis).  Plan: Patient w/ knonwn prior DVT on eliquis     - c/w eliquis monitor stools for bleeding     - eliquis 5mg BID.          Problem/Plan - 9:    ·  Problem: Prophylactic measure.  Plan: on full dose AC.          Problem/Plan - 10:    Problem: Discharge planning issues. Plan; - home w/ no needs. 20.9 75 F PMHx DM, DVT (on Eliquis), hypothyroid, psoriasis, vitiligo p/w 1 week of abdominal pain. CT abdomen and US abdomen w/o acute findings, calcification of gall bladder. Pt reports 40lb weight loss over a one month time span due to inability to tolerate PO. DDX includes PUD vs GERD vs gastritis vs ACS (unlikely troponin negative x 2 with recent normal stress test) vs biliary pathology vs gastroparesis. GI consulted. Pt placed on PPI, Maalox PRN, Dicyclomine with improvement. No plans for repeat EGD as per GI.     Discharge to home with outpatient Pulm/GI follow up.          75 F PMHx DM, DVT (on Eliquis), hypothyroid, psoriasis, vitiligo p/w 1 week of abdominal pain. CT abdomen and US abdomen w/o acute findings, calcification of gall bladder. Pt reports 40lb weight loss over a one month time span due to inability to tolerate PO. Patient had a recent EGD which just demonstrated atrophic gastritis, nothing   else to suggest a malignant cause for weight loss and epigastric pain. Pain improved with Maalox and Bentyl, continue on discharge. Follow up with GI.   As part of weight loss workup, patient underwent CT chest which demonstrated bilateral nodules/opacities. Recommend OP Pulm follow up which patient was amenable to.     Discharge to home with outpatient Pulm/GI follow up.

## 2023-01-12 NOTE — H&P ADULT - NSICDXPASTSURGICALHX_GEN_ALL_CORE_FT
PAST SURGICAL HISTORY:  S/P hernia surgery 41 year old  @ 37.0 weeks, admitted to L&D for r/o superimposed pre-eclampsia with elevated BPs from baseline, associated with occasional contractions, Cat I tracing, asymptomatic, mild range BPs, vital signs stable   - Admit to L&D for unscheduled repeat quaternary  and bilateral salpingectomy for sterilization, and cerclage removal; due to early labor and elevated blood pressures from baseline  - NICU consulted for fetal alert - will come to bedside with peds surgery   - NPO  - IV access, CBC/T&C/RPR, COVID swab, HELLP labs  - Pepcid and Bicitra as per pre-op policy  - T&C 2 units PRBCs  - Anesthesia consult   - Plan to move to OR once all team is assembled and ready  - Educated and discussed with patient the assessment and plan, pt verbalized understanding and agreement with assessment and plan, all questions answered  - Discussed with Dr. Hebert (M attending) and Dr. Arboleda PGY4, and Dr. Walter (Service attending)

## 2023-01-16 NOTE — ED ADULT TRIAGE NOTE - BP NONINVASIVE SYSTOLIC (MM HG)
150 Burow's Advancement Flap Text: The defect edges were debeveled with a #15 scalpel blade.  Given the location of the defect and the proximity to free margins a Burow's advancement flap was deemed most appropriate.  Using a sterile surgical marker, the appropriate advancement flap was drawn incorporating the defect and placing the expected incisions within the relaxed skin tension lines where possible.    The area thus outlined was incised deep to adipose tissue with a #15 scalpel blade.  The skin margins were undermined to an appropriate distance in all directions utilizing iris scissors. Klisyri Pregnancy And Lactation Text: It is unknown if this medication can harm a developing fetus or if it is excreted in breast milk.

## 2023-01-23 NOTE — PATIENT PROFILE ADULT - IS THERE A SUSPICION OF ABUSE/NEGLIGENCE?
Physician Progress Note      PATIENT:               Marguerite Mccloud  CSN #:                  [id number]  :                       1940  ADMIT DATE:       2023 8:24 AM  DISCH DATE:  RESPONDING  PROVIDER #:        Berta Jackson MD          QUERY TEXT:    Hospitalists,    Pt admitted with L2 & T5 compression fx, acute fracture of distal left   clavicle following a fall. Pt noted to have ***. If possible, please document   in progress notes and discharge summary if you are evaluating and/or treating   any of the following: The medical record reflects the following:  Risk Factors: steroid use, osteoporosis  Clinical Indicators: diffuse osteopenia documented in imaging report. Per   Internal Med documentation: \"Suspected Osteoporosis, possibly secondary to   chronic steroid use FRAX score without BMD: 10 year probability of major   osteoporotic fracture 32%, Hip fracture  20 %. Since FRAX doesn't take steroid   dose into account and patient is on 7.5 mg prednisone daily, his osteoporotic   fracture risk are much higher. \"  Treatment: labs, imaging, Neurosurg consult, PT, Fosamax, [place], forest D    Thank you,  Risa Adkins RN [facility]  [phone]  Options provided:  -- Pathological fracture  -- Pathological  fracture due to osteopenia  -- Pathological fracture due to osteopenia following fall which would not   usually break a normal, healthy bone  -- Osteoporotic Fracture  -- Osteoporotic fracture following fall which would not usually break a   normal, healthy bone  -- Traumatic  fracture  -- Other - I will add my own diagnosis  -- Disagree - Not applicable / Not valid  -- Disagree - Clinically unable to determine / Unknown  -- Refer to Clinical Documentation Reviewer    PROVIDER RESPONSE TEXT:    Provider disagreed with this query.   its not a pathologic fracture    Query created by: Spencer Champion on 2023 1:49 PM      Electronically signed by:  Berta Jackson MD 2023 1:50 PM no

## 2023-04-12 NOTE — DISCHARGE NOTE NURSING/CASE MANAGEMENT/SOCIAL WORK - NSDCPEELIQUISDIET_GEN_ALL_CORE
Received referral from [place] Eat healthy foods you enjoy. Apixaban/Eliquis DOES NOT have a special diet. Limit your alcohol intake.

## 2023-08-27 ENCOUNTER — EMERGENCY (EMERGENCY)
Facility: HOSPITAL | Age: 78
LOS: 1 days | Discharge: HOME | End: 2023-08-27
Attending: STUDENT IN AN ORGANIZED HEALTH CARE EDUCATION/TRAINING PROGRAM | Admitting: STUDENT IN AN ORGANIZED HEALTH CARE EDUCATION/TRAINING PROGRAM
Payer: MEDICARE

## 2023-08-27 VITALS
HEART RATE: 84 BPM | SYSTOLIC BLOOD PRESSURE: 170 MMHG | DIASTOLIC BLOOD PRESSURE: 95 MMHG | OXYGEN SATURATION: 97 % | TEMPERATURE: 98 F | RESPIRATION RATE: 18 BRPM

## 2023-08-27 DIAGNOSIS — Z98.890 OTHER SPECIFIED POSTPROCEDURAL STATES: Chronic | ICD-10-CM

## 2023-08-27 LAB
FLUAV AG NPH QL: SIGNIFICANT CHANGE UP
FLUBV AG NPH QL: SIGNIFICANT CHANGE UP
RSV RNA NPH QL NAA+NON-PROBE: SIGNIFICANT CHANGE UP
SARS-COV-2 RNA SPEC QL NAA+PROBE: SIGNIFICANT CHANGE UP

## 2023-08-27 PROCEDURE — 71046 X-RAY EXAM CHEST 2 VIEWS: CPT | Mod: 26

## 2023-08-27 PROCEDURE — 99284 EMERGENCY DEPT VISIT MOD MDM: CPT | Mod: 25

## 2023-08-27 RX ORDER — GUAIFENESIN/DEXTROMETHORPHAN 600MG-30MG
30 TABLET, EXTENDED RELEASE 12 HR ORAL ONCE
Refills: 0 | Status: COMPLETED | OUTPATIENT
Start: 2023-08-27 | End: 2023-08-27

## 2023-08-27 RX ADMIN — Medication 30 MILLILITER(S): at 06:45

## 2023-08-27 NOTE — ED PROVIDER NOTE - OBJECTIVE STATEMENT
Patient with 5 days of cough and runny nose.  Denies any other symptoms. 78-year-old female past medical history diabetes, VTE on anticoagulation, hypothyroidism, psoriasis, asthma, vitiligo, hypertension presents with  for 5 days 5 days of cough and runny nose.  Denies any other symptoms. Reports grandchild had similar symptoms but is getting better.Triage note reports shortness of breath however patient denies any recent shortness of breath.  She denies fever, chills, chest pain, headaches, abdominal pain, nausea, vomiting, diarrhea patient has any dysuria hematuria.  Denies any back pain.  Denies any extremity pain or swelling.

## 2023-08-27 NOTE — ED ADULT NURSE NOTE - OBJECTIVE STATEMENT
received rm3. A&Ox4 RR even unlabored completing full sentences. pt presents c/o cough/congestion x2days. Pt endorse sob with walking. Denies fevers/chills. denies HA/CP. past history HTN, DM, asthma. appears NAD at this time. medicated per order set. covid swab sent per order set. stretcher lowest position siderails up safety measures maintained

## 2023-08-27 NOTE — ED PROVIDER NOTE - PATIENT PORTAL LINK FT
You can access the FollowMyHealth Patient Portal offered by [facility] by registering at the following website: [email or web address]. By joining [facility]’s FollowMyHealth portal, you will also be able to view your health information using other applications (apps) compatible with our system.

## 2023-08-27 NOTE — ED ADULT TRIAGE NOTE - CHIEF COMPLAINT QUOTE
Patient c/o flu like symptoms for two days. Endorses cough and shortness of breath. Denies fevers/chills. No HA/CP. PMH HTN, DM, asthma.

## 2023-08-27 NOTE — ED PROVIDER NOTE - NS ED ROS FT
Constitutional: No fever. no chills.   Eyes: No visual changes, eye pain or redness  HEENT: No throat pain  CV: No chest pain, no palpitations  Resp: No shortness of breath, + cough  GI: No abdominal pain. No nausea. no  vomiting. No diarrhea. No constipation.   : No dysuria, hematuria. no urinary frequency, no urinary urgency.  MSK: No musculoskeletal pain  Skin: No rash, lesions, no bruises  Neuro: No headache. No numbness or tingling. No weakness. No dizziness.  Allergy/Immunology: no allergy to medicine or food.

## 2023-08-27 NOTE — ED PROVIDER NOTE - CLINICAL SUMMARY MEDICAL DECISION MAKING FREE TEXT BOX
Patient with cough with sputum and runny nose for approximately 5 days.  No other reported symptoms.  Patient without shortness of breath.  Lungs clear to auscultation.  Patient not hypoxic or tachypneic.  Patient comfortably speaking full sentences.  Patient tolerating p.o.  Suspect likely viral illness.  Will check x-ray as well as flu/COVID reassess.  X-ray without focal infiltrate.  Swab negative for flu and COVID.  Patient breathing comfortably without additional symptoms.  Stable for discharge with close outpatient follow-up.  Strict return precaution discussed at bedside patient and her   and they expressed understanding.

## 2023-08-27 NOTE — ED ADULT NURSE NOTE - NSFALLUNIVINTERV_ED_ALL_ED
Bed/Stretcher in lowest position, wheels locked, appropriate side rails in place/Call bell, personal items and telephone in reach/Instruct patient to call for assistance before getting out of bed/chair/stretcher/Non-slip footwear applied when patient is off stretcher/Petros to call system/Physically safe environment - no spills, clutter or unnecessary equipment/Purposeful proactive rounding/Room/bathroom lighting operational, light cord in reach

## 2023-08-27 NOTE — ED PROVIDER NOTE - NSFOLLOWUPINSTRUCTIONS_ED_ALL_ED_FT
1) Please follow-up with your primary care doctor within the next 2-3 days.  Please call today for an appointment.   2) If you have any worsening of symptoms or any other concerns please return to the ED immediately.  3) Please continue taking your home medications as directed.  4) You may have been given a copy of your labs and/or imaging.  Please go over these with your primary care doctor.    Cough, Adult  Coughing is a reflex that clears your throat and your airways (respiratory system). Coughing helps to heal and protect your lungs. It is normal to cough occasionally, but a cough that happens with other symptoms or lasts a long time may be a sign of a condition that needs treatment. An acute cough may only last 2–3 weeks, while a chronic cough may last 8 or more weeks.    Coughing is commonly caused by:  Infection of the respiratory systemby viruses or bacteria.  Breathing in substances that irritate your lungs.  Allergies.  Asthma.  Mucus that runs down the back of your throat (postnasal drip).  Smoking.  Acid backing up from the stomach into the esophagus (gastroesophageal reflux).  Certain medicines.  Chronic lung problems.  Other medical conditions such as heart failure or a blood clot in the lung (pulmonary embolism).  Follow these instructions at home:  Medicines    Take over-the-counter and prescription medicines only as told by your health care provider.  Talk with your health care provider before you take a cough suppressant medicine.  Lifestyle      Avoid cigarette smoke. Do not use any products that contain nicotine or tobacco, such as cigarettes, e-cigarettes, and chewing tobacco. If you need help quitting, ask your health care provider.  Drink enough fluid to keep your urine pale yellow.  Avoid caffeine.  Do not drink alcohol if your health care provider tells you not to drink.  General instructions      Pay close attention to changes in your cough. Tell your health care provider about them.  Always cover your mouth when you cough.  Avoid things that make you cough, such as perfume, candles, cleaning products, or campfire or tobacco smoke.  If the air is dry, use a cool mist vaporizer or humidifier in your bedroom or your home to help loosen secretions.  If your cough is worse at night, try to sleep in a semi-upright position.  Rest as needed.  Keep all follow-up visits as told by your health care provider. This is important.  Contact a health care provider if you:  Have new symptoms.  Cough up pus.  Have a cough that does not get better after 2–3 weeks or gets worse.  Cannot control your cough with cough suppressant medicines and you are losing sleep.  Have pain that gets worse or pain that is not helped with medicine.  Have a fever.  Have unexplained weight loss.  Have night sweats.  Get help right away if:  You cough up blood.  You have difficulty breathing.  Your heartbeat is very fast.  These symptoms may represent a serious problem that is an emergency. Do not wait to see if the symptoms will go away. Get medical help right away. Call your local emergency services (911 in the [place] Do not drive yourself to the hospital.    Summary  Coughing is a reflex that clears your throat and your airways. It is normal to cough occasionally, but a cough that happens with other symptoms or lasts a long time may be a sign of a condition that needs treatment.  Take over-the-counter and prescription medicines only as told by your health care provider.  Always cover your mouth when you cough.  Contact a health care provider if you have new symptoms or a cough that does not get better after 2–3 weeks or gets worse.  This information is not intended to replace advice given to you by your health care provider. Make sure you discuss any questions you have with your health care provider.

## 2023-08-27 NOTE — ED PROVIDER NOTE - PHYSICAL EXAMINATION
GEN: no acute respiratory distress. nontoxic, speaking comfortably in full sentences  HEENT: NCAT. face symmetrical. PERRL 4mm, EOMI, MMM, oropharynx wnl.  Neck: no JVD, trachea midline, no lymphadenopathy, FROM  CV: RRR. +S1S2, no murmur. 2+ pulses in 4 extremities, cap refill <2 sec  Chest: CTA B/l. no wheezing, rales, rhonchi. no retractions. good air movement.   ABD: +BS, soft, non distended, non tender.   MSK: No clubbing, cyanosis, edema. FROM of all extremities. no tenderness to palpation. No midline or paraspinal tenderness.   Neuro: AAOX3.  Sensation and motor grossly intact  SKIN: No rash

## 2023-09-29 NOTE — PROGRESS NOTE ADULT - PROBLEM SELECTOR PLAN 3
A percutaneous stick to the right femoral artery was performed. -Stable.  -Continue Eliquis 10 BID until 8/12. Switch to 5mg BID on 8/13.

## 2024-05-16 NOTE — PHYSICAL THERAPY INITIAL EVALUATION ADULT - PATIENT PROFILE REVIEW, REHAB EVAL
(5/16) H/H 12.3/38.8 L,  L, BUN 37 H, Creat 1.73 H, Glu 134 H  (3/15) Albumin 3.4, AST 51 H, ALT 47 H, lipase 66 H   A1C with Estimated Average Glucose Result: 7.6 % (05-16-24 @ 00:20)  A1C with Estimated Average Glucose Result: 7.7 % (03-02-24 @ 06:36)  CAPILLARY BLOOD GLUCOSE  POCT Blood Glucose.: 141 mg/dL (16 May 2024 11:35)  POCT Blood Glucose.: 140 mg/dL (16 May 2024 05:14)  POCT Blood Glucose.: 263 mg/dL (16 May 2024 02:47)  POCT Blood Glucose.: 180 mg/dL (15 May 2024 23:21)  POCT Blood Glucose.: 298 mg/dL (15 May 2024 21:39)  POCT Blood Glucose.: 221 mg/dL (15 May 2024 21:11)  
yes

## 2024-11-23 NOTE — ED ADULT TRIAGE NOTE - PAIN: PRESENCE, MLM
Patient has a current diagnosis of Hypertensive emergency with end organ damage evidenced by acute coronary syndrome which is controlled.  Latest blood pressure and vitals reviewed-   Temp:  [98 °F (36.7 °C)-98.5 °F (36.9 °C)]   Pulse:  [85-91]   Resp:  [16-20]   BP: (113-147)/(66-82)   SpO2:  [95 %-98 %] .   Patient currently off IV antihypertensives.   Home meds for hypertension were reviewed and noted below.   Hypertension Medications               lisinopriL (PRINIVIL,ZESTRIL) 20 MG tablet Take 1 tablet (20 mg total) by mouth once daily.            Medication adjustment for hospital antihypertensives is as follows- Add additional blood pressure mediation if patient able to tolerate.     Will aim for controlled BP reduction by medications noted above. Monitor and mitigate end organ damage as indicated.    11/23: patient reports she may have had hypoglycemia episode.  This may be why she had had hypertension and tachycardia.     complains of pain/discomfort

## 2024-12-13 NOTE — ED ADULT NURSE NOTE - NSSEPSISSUSPECTED_ED_A_ED
Received referral from [facility].  Approved a 1x voucher for 2 medication(s).  Faxed voucher to [facility]patient [facility].      Patient will need to complete application and provide required documentation for future assistance.      Added patient to the flagged list.   No

## 2025-01-02 NOTE — DISCHARGE NOTE PROVIDER - NSDCHHBASESERVICE_GEN_ALL_CORE
Detail Level: Detailed
Plan: Apply sunscreen 30spf or greater every morning.
Physical therapy/Nursing

## 2025-04-20 NOTE — H&P ADULT - NSHPLABSRESULTS_GEN_ALL_CORE
No previous uterine incision/Anne Pregnancy/Less than or equal to 4 previous vaginal births/No known bleeding disorder/No history of postpartum hemorrhage/No other PPH risks indicated
7.8    7.12  )-----------( 192      ( 08 Aug 2020 02:26 )             22.0     08-08    138  |  101  |  20  ----------------------------<  101<H>  4.1   |  24  |  1.45<H>    Ca    10.5      08 Aug 2020 02:36  Phos  2.5     08-07  Mg     2.1     08-07    TPro  7.1  /  Alb  3.7  /  TBili  0.6  /  DBili  x   /  AST  16  /  ALT  8   /  AlkPhos  79  08-08        < from: CT Lumbar Spine No Cont (08.08.20 @ 04:12) >    EXAM:  CT LUMBAR SPINE        PROCEDURE DATE:  Aug  8 2020         INTERPRETATION:  CLINICAL INFORMATION:  Lower extremity weakness. Evaluate for lumbar fractures.    TECHNIQUE:  Serial axial images were obtained using multi slice helical technique. Sagittal and coronal reformatted images were performed.    COMPARISON:  Abdominal CT of 8/5/2020.    FINDINGS:    VERTEBRAL BODIES AND DISCS:  Vertebral body heights are maintained. Intervertebral body heights are maintained. There is vacuum disc phenomena throughout the lumbar spine.    ALIGNMENT:  There is grade 1 anterolisthesis of L5 on S1. Dextrocurvature of the lumbar spine with L3 apex vertebra.    L1-L2 LEVEL:  No central canal stenosis or foraminal narrowing. Mild facet hypertrophy.  L2-L3 LEVEL:  Small disc bulge. No central canal stenosis. Mild left foraminal narrowing. Mild facet hypertrophy.  L3-L4 LEVEL:  Small disc bulge. Mild central canal stenosis. No foraminal narrowing. Mild facet hypertrophy.  L4-L5 LEVEL:  Small disc bulge. Moderate central canal stenosis. Moderate bilateral foraminal narrowing. Significant facet hypertrophy.  L5-S1 LEVEL:  Small disc bulge. Mild central canal stenosis. Moderate bilateral foraminal narrowing. Significant facet hypertrophy.    IMPRESSION:    Multilevel lumbar spondylosis which is most pronounced at the L4-L5 level where there is moderate central canal stenosis and L3-L4 and L5-S1 levels where there is mild central canal stenosis.    < end of copied text >      < from: US Duplex Venous Lower Ext Complete, Bilateral (08.08.20 @ 03:19) >    EXAM:  US DPLX LWR EXT VEINS COMPL BI        PROCEDURE DATE:  Aug  8 2020         INTERPRETATION:  CLINICAL INFORMATION: Evaluate DVT seen on CT abdomen pelvis 8/5/2020.    COMPARISON: CT abdomen pelvis 8/5/2020.    TECHNIQUE: Duplex sonography of the BILATERAL LOWER extremity veins with color and spectral Doppler, with and without compression.    FINDINGS:    There is thrombus within and noncompressibility of the right common femoral, proximal and mid femoral vein. The distal right femoral andpopliteal veins are compressible. There is thrombus within and noncompressibility of the gastrocnemius vein. Remaining calf veins are compressible.    There is thrombus within an noncompressibility of the left common femoral vein to the level of the popliteal vein. Distal femoral vein not visualized.  There is compressibility of the gastrocnemius and posterior tibial veins.    Doppler examination shows phasic variation.      IMPRESSION:  Acute deep venous thrombosis: Above and below the RIGHT knee.  Acute deep venous thrombosis: above the LEFT knee.    Known DVT seen on recent CT of 8/5/2020.    < end of copied text >
